# Patient Record
Sex: MALE | Race: WHITE | NOT HISPANIC OR LATINO | Employment: FULL TIME | URBAN - METROPOLITAN AREA
[De-identification: names, ages, dates, MRNs, and addresses within clinical notes are randomized per-mention and may not be internally consistent; named-entity substitution may affect disease eponyms.]

---

## 2017-06-26 ENCOUNTER — OFFICE VISIT (OUTPATIENT)
Dept: CARDIOLOGY | Facility: MEDICAL CENTER | Age: 57
End: 2017-06-26
Payer: COMMERCIAL

## 2017-06-26 VITALS
WEIGHT: 231 LBS | SYSTOLIC BLOOD PRESSURE: 200 MMHG | DIASTOLIC BLOOD PRESSURE: 120 MMHG | HEIGHT: 70 IN | BODY MASS INDEX: 33.07 KG/M2 | HEART RATE: 76 BPM | OXYGEN SATURATION: 97 %

## 2017-06-26 DIAGNOSIS — I10 ESSENTIAL HYPERTENSION: ICD-10-CM

## 2017-06-26 DIAGNOSIS — I49.1 PREMATURE ATRIAL COMPLEX: ICD-10-CM

## 2017-06-26 DIAGNOSIS — I15.9 SECONDARY HYPERTENSION: ICD-10-CM

## 2017-06-26 LAB — EKG IMPRESSION: NORMAL

## 2017-06-26 PROCEDURE — 93000 ELECTROCARDIOGRAM COMPLETE: CPT | Performed by: INTERNAL MEDICINE

## 2017-06-26 PROCEDURE — 99204 OFFICE O/P NEW MOD 45 MIN: CPT | Performed by: INTERNAL MEDICINE

## 2017-06-26 RX ORDER — IBUPROFEN 800 MG/1
800 TABLET ORAL EVERY 8 HOURS PRN
Refills: 3 | COMMUNITY
Start: 2017-06-14 | End: 2020-01-10

## 2017-06-26 RX ORDER — PREDNISOLONE ACETATE 10 MG/ML
SUSPENSION/ DROPS OPHTHALMIC
Refills: 2 | COMMUNITY
Start: 2017-05-14 | End: 2020-01-10

## 2017-06-26 RX ORDER — HYDROCODONE BITARTRATE AND ACETAMINOPHEN 5; 325 MG/1; MG/1
1 TABLET ORAL EVERY 8 HOURS PRN
Refills: 0 | COMMUNITY
Start: 2017-05-25 | End: 2020-01-10

## 2017-06-26 RX ORDER — DOXYCYCLINE HYCLATE 100 MG
100 TABLET ORAL EVERY 4 HOURS PRN
Refills: 0 | COMMUNITY
Start: 2017-06-06 | End: 2020-01-10

## 2017-06-26 RX ORDER — AMLODIPINE BESYLATE 10 MG/1
10 TABLET ORAL
Refills: 1 | COMMUNITY
Start: 2017-06-09 | End: 2020-01-10

## 2017-06-26 RX ORDER — LOSARTAN POTASSIUM 100 MG/1
100 TABLET ORAL DAILY
Qty: 30 TAB | Refills: 11 | Status: SHIPPED | OUTPATIENT
Start: 2017-06-26 | End: 2020-01-10

## 2017-06-26 RX ORDER — NADOLOL 20 MG/1
20 TABLET ORAL
Refills: 1 | COMMUNITY
Start: 2017-05-25 | End: 2017-06-26

## 2017-06-26 RX ORDER — CIPROFLOXACIN HYDROCHLORIDE 3.5 MG/ML
SOLUTION/ DROPS TOPICAL
Refills: 2 | COMMUNITY
Start: 2017-05-23 | End: 2020-01-10

## 2017-06-26 ASSESSMENT — ENCOUNTER SYMPTOMS
SORE THROAT: 0
HEMOPTYSIS: 0
DIZZINESS: 0
RESPIRATORY NEGATIVE: 1
STRIDOR: 0
PND: 0
CLAUDICATION: 0
SPUTUM PRODUCTION: 0
WHEEZING: 0
GASTROINTESTINAL NEGATIVE: 1
CONSTITUTIONAL NEGATIVE: 1
ORTHOPNEA: 0
NEUROLOGICAL NEGATIVE: 1
MUSCULOSKELETAL NEGATIVE: 1
EYES NEGATIVE: 1
CHILLS: 0
COUGH: 0
BRUISES/BLEEDS EASILY: 0
FEVER: 0
PALPITATIONS: 0
SHORTNESS OF BREATH: 0
CARDIOVASCULAR NEGATIVE: 1
LOSS OF CONSCIOUSNESS: 0
WEAKNESS: 0

## 2017-06-26 NOTE — Clinical Note
[facility]   [address]  [place] [address]  Phone: [phone]  Fax: [phone]              Conner Fox  1960    Encounter Date: 6/26/2017    David Chatman M.D.          PROGRESS NOTE:  Subjective:   Conner Fox is a 57 y.o. male who presents today for evaluation for palpitations-blood pressure. His blood pressure today is 200 but is noticed as high as 220 in the past. He is supposed be on nadolol but says that he didn't take his dose today. He is concerned about being on this medication as it causes a lot of sexual side effects.  He was referred for occasional PACs. He does not get chest pain or shortness breath with exertion. ECG today shows a normal sinus rhythm with a left axis deviation and a borderline left anterior fascicular block.     Past Medical History   Diagnosis Date   • Hypertension      History reviewed. No pertinent past surgical history.  History reviewed. No pertinent family history.  History   Smoking status   • Never Smoker    Smokeless tobacco   • Not on file     Allergies   Allergen Reactions   • Lisinopril Cough     Outpatient Encounter Prescriptions as of 6/26/2017   Medication Sig Dispense Refill   • amlodipine (NORVASC) 10 MG Tab Take 10 mg by mouth.  1   • losartan (COZAAR) 100 MG Tab Take 1 Tab by mouth every day. 30 Tab 11   • ciprofloxacin (CILOXIN) 0.3 % Solution PLACE 1 DROP IN THE LEFT EYE 4 TIMES DAILY  2   • doxycycline (VIBRAMYCIN) 100 MG Tab Take 100 mg by mouth every four hours as needed. EVERY 4 TO 6 HOURS AS NEEDED  0   • hydrocodone-acetaminophen (NORCO) 5-325 MG Tab per tablet Take 1 Tab by mouth every 8 hours as needed.  0   • ibuprofen (MOTRIN) 800 MG Tab Take 800 mg by mouth every 8 hours as needed.  3   • prednisoLONE acetate (PRED FORTE) 1 % Suspension PLACE 1 DROP IN THE LEFT EYE 4 TIMES DAILY  2   • [DISCONTINUED] nadolol (CORGARD) 20 MG Tab Take 20 mg by mouth.  1     No  "facility-administered encounter medications on file as of 6/26/2017.     Review of Systems   Constitutional: Negative.  Negative for fever, chills and malaise/fatigue.   HENT: Negative.  Negative for sore throat.    Eyes: Negative.    Respiratory: Negative.  Negative for cough, hemoptysis, sputum production, shortness of breath, wheezing and stridor.    Cardiovascular: Negative.  Negative for chest pain, palpitations, orthopnea, claudication, leg swelling and PND.   Gastrointestinal: Negative.    Genitourinary: Negative.    Musculoskeletal: Negative.    Skin: Negative.    Neurological: Negative.  Negative for dizziness, loss of consciousness and weakness.   Endo/Heme/Allergies: Negative.  Does not bruise/bleed easily.   All other systems reviewed and are negative.       Objective:   /120 mmHg  Pulse 76  Ht 1.778 m (5' 10\")  Wt 104.781 kg (231 lb)  BMI 33.15 kg/m2  SpO2 97%    Physical Exam   Constitutional: He is oriented to person, place, and time. He appears well-developed and well-nourished. No distress.   HENT:   Head: Normocephalic.   Mouth/Throat: Oropharynx is clear and moist.   Eyes: EOM are normal. Pupils are equal, round, and reactive to light. Right eye exhibits no discharge. Left eye exhibits no discharge. No scleral icterus.   Neck: Normal range of motion. Neck supple. No JVD present. No tracheal deviation present.   Cardiovascular: Normal rate, regular rhythm, S1 normal, S2 normal, normal heart sounds, intact distal pulses and normal pulses.  Exam reveals no gallop, no S3, no S4 and no friction rub.    No murmur heard.   No systolic murmur is present    No diastolic murmur is present   Pulses:       Carotid pulses are 2+ on the right side, and 2+ on the left side.       Radial pulses are 2+ on the right side, and 2+ on the left side.        Dorsalis pedis pulses are 2+ on the right side, and 2+ on the left side.        Posterior tibial pulses are 2+ on the right side, and 2+ on the left " side.   Pulmonary/Chest: Effort normal and breath sounds normal. No respiratory distress. He has no wheezes. He has no rales.   Abdominal: Soft. Bowel sounds are normal. He exhibits no distension and no mass. There is no tenderness. There is no rebound and no guarding.   Musculoskeletal: He exhibits no edema.   Neurological: He is alert and oriented to person, place, and time. No cranial nerve deficit.   Skin: Skin is warm and dry. He is not diaphoretic. No pallor.   Psychiatric: He has a normal mood and affect. His behavior is normal. Judgment and thought content normal.   Nursing note and vitals reviewed.      Assessment:     1. Secondary hypertension  EKG    losartan (COZAAR) 100 MG Tab   2. Essential hypertension  losartan (COZAAR) 100 MG Tab   3. Premature atrial complex  losartan (COZAAR) 100 MG Tab       Medical Decision Making:  Today's Assessment / Status / Plan:     57-year-old male with no functional limitations and occasional PACs with elevated blood pressure. Likely his PACs or result of his uncontrolled high blood pressure. Because of his sexual side effects  I will stop his nadolol today.  I will start him on 100 of a certain today. I also gave him instructions on how to titrate off his nadolol over the next 2 weeks.    Thank for you allowing me to take part in your patient's care, please call should you have any questions or would like to discuss this patient.      Rahul De La Paz M.D.  [address] [address]  VIA Facsimile: [phone]

## 2017-06-26 NOTE — PROGRESS NOTES
Subjective:   Conner Fox is a 57 y.o. male who presents today for evaluation for palpitations-blood pressure. His blood pressure today is 200 but is noticed as high as 220 in the past. He is supposed be on nadolol but says that he didn't take his dose today. He is concerned about being on this medication as it causes a lot of sexual side effects.  He was referred for occasional PACs. He does not get chest pain or shortness breath with exertion. ECG today shows a normal sinus rhythm with a left axis deviation and a borderline left anterior fascicular block.     Past Medical History   Diagnosis Date   • Hypertension      History reviewed. No pertinent past surgical history.  History reviewed. No pertinent family history.  History   Smoking status   • Never Smoker    Smokeless tobacco   • Not on file     Allergies   Allergen Reactions   • Lisinopril Cough     Outpatient Encounter Prescriptions as of 6/26/2017   Medication Sig Dispense Refill   • amlodipine (NORVASC) 10 MG Tab Take 10 mg by mouth.  1   • losartan (COZAAR) 100 MG Tab Take 1 Tab by mouth every day. 30 Tab 11   • ciprofloxacin (CILOXIN) 0.3 % Solution PLACE 1 DROP IN THE LEFT EYE 4 TIMES DAILY  2   • doxycycline (VIBRAMYCIN) 100 MG Tab Take 100 mg by mouth every four hours as needed. EVERY 4 TO 6 HOURS AS NEEDED  0   • hydrocodone-acetaminophen (NORCO) 5-325 MG Tab per tablet Take 1 Tab by mouth every 8 hours as needed.  0   • ibuprofen (MOTRIN) 800 MG Tab Take 800 mg by mouth every 8 hours as needed.  3   • prednisoLONE acetate (PRED FORTE) 1 % Suspension PLACE 1 DROP IN THE LEFT EYE 4 TIMES DAILY  2   • [DISCONTINUED] nadolol (CORGARD) 20 MG Tab Take 20 mg by mouth.  1     No facility-administered encounter medications on file as of 6/26/2017.     Review of Systems   Constitutional: Negative.  Negative for fever, chills and malaise/fatigue.   HENT: Negative.  Negative for sore throat.    Eyes: Negative.    Respiratory: Negative.  Negative for  "cough, hemoptysis, sputum production, shortness of breath, wheezing and stridor.    Cardiovascular: Negative.  Negative for chest pain, palpitations, orthopnea, claudication, leg swelling and PND.   Gastrointestinal: Negative.    Genitourinary: Negative.    Musculoskeletal: Negative.    Skin: Negative.    Neurological: Negative.  Negative for dizziness, loss of consciousness and weakness.   Endo/Heme/Allergies: Negative.  Does not bruise/bleed easily.   All other systems reviewed and are negative.       Objective:   /120 mmHg  Pulse 76  Ht 1.778 m (5' 10\")  Wt 104.781 kg (231 lb)  BMI 33.15 kg/m2  SpO2 97%    Physical Exam   Constitutional: He is oriented to person, place, and time. He appears well-developed and well-nourished. No distress.   HENT:   Head: Normocephalic.   Mouth/Throat: Oropharynx is clear and moist.   Eyes: EOM are normal. Pupils are equal, round, and reactive to light. Right eye exhibits no discharge. Left eye exhibits no discharge. No scleral icterus.   Neck: Normal range of motion. Neck supple. No JVD present. No tracheal deviation present.   Cardiovascular: Normal rate, regular rhythm, S1 normal, S2 normal, normal heart sounds, intact distal pulses and normal pulses.  Exam reveals no gallop, no S3, no S4 and no friction rub.    No murmur heard.   No systolic murmur is present    No diastolic murmur is present   Pulses:       Carotid pulses are 2+ on the right side, and 2+ on the left side.       Radial pulses are 2+ on the right side, and 2+ on the left side.        Dorsalis pedis pulses are 2+ on the right side, and 2+ on the left side.        Posterior tibial pulses are 2+ on the right side, and 2+ on the left side.   Pulmonary/Chest: Effort normal and breath sounds normal. No respiratory distress. He has no wheezes. He has no rales.   Abdominal: Soft. Bowel sounds are normal. He exhibits no distension and no mass. There is no tenderness. There is no rebound and no guarding. "   Musculoskeletal: He exhibits no edema.   Neurological: He is alert and oriented to person, place, and time. No cranial nerve deficit.   Skin: Skin is warm and dry. He is not diaphoretic. No pallor.   Psychiatric: He has a normal mood and affect. His behavior is normal. Judgment and thought content normal.   Nursing note and vitals reviewed.      Assessment:     1. Secondary hypertension  EKG    losartan (COZAAR) 100 MG Tab   2. Essential hypertension  losartan (COZAAR) 100 MG Tab   3. Premature atrial complex  losartan (COZAAR) 100 MG Tab       Medical Decision Making:  Today's Assessment / Status / Plan:     57-year-old male with no functional limitations and occasional PACs with elevated blood pressure. Likely his PACs or result of his uncontrolled high blood pressure. Because of his sexual side effects  I will stop his nadolol today.  I will start him on 100 of a certain today. I also gave him instructions on how to titrate off his nadolol over the next 2 weeks.    Thank for you allowing me to take part in your patient's care, please call should you have any questions or would like to discuss this patient.

## 2017-06-26 NOTE — MR AVS SNAPSHOT
"        Conner Perezfrank   2017 3:15 PM   Office Visit   MRN: [id number]    Department:  [facility]   Dept Phone:  [phone]    Description:  Male : 1960   Provider:  David Chatman M.D.           Reason for Visit     New Patient           Allergies as of 2017     Allergen Noted Reactions    Lisinopril 2017   Cough      You were diagnosed with     Secondary hypertension   [3275573]       Essential hypertension   [9690736]       Premature atrial complex   [[id number]]         Vital Signs     Blood Pressure Pulse Height Weight Body Mass Index Oxygen Saturation    200/120 mmHg 76 1.778 m (5' 10\") 104.781 kg (231 lb) 33.15 kg/m2 97%    Smoking Status                   Never Smoker            Basic Information     Date Of Birth Sex Race Ethnicity Preferred Language    1960 Male White Non- English      Problem List              ICD-10-CM Priority Class Noted - Resolved    Essential hypertension I10   2017 - Present    Premature atrial complex I49.1   2017 - Present      Health Maintenance     Patient has no pending health maintenance at this time      Results       Current Immunizations     No immunizations on file.      Below and/or attached are the medications your provider expects you to take. Review all of your home medications and newly ordered medications with your provider and/or pharmacist. Follow medication instructions as directed by your provider and/or pharmacist. Please keep your medication list with you and share with your provider. Update the information when medications are discontinued, doses are changed, or new medications (including over-the-counter products) are added; and carry medication information at all times in the event of emergency situations     Allergies:  LISINOPRIL - Cough               Medications  Valid as of: 2017 -  4:11 PM    Generic Name Brand Name Tablet Size Instructions for use    AmLODIPine Besylate (Tab) NORVASC 10 " MG Take 10 mg by mouth.        Ciprofloxacin HCl (Solution) CILOXIN 0.3 % PLACE 1 DROP IN THE LEFT EYE 4 TIMES DAILY        Doxycycline Hyclate (Tab) VIBRAMYCIN 100 MG Take 100 mg by mouth every four hours as needed. EVERY 4 TO 6 HOURS AS NEEDED        Hydrocodone-Acetaminophen (Tab) NORCO 5-325 MG Take 1 Tab by mouth every 8 hours as needed.        Ibuprofen (Tab) MOTRIN 800 MG Take 800 mg by mouth every 8 hours as needed.        Losartan Potassium (Tab) COZAAR 100 MG Take 1 Tab by mouth every day.        PrednisoLONE Acetate (Suspension) PRED FORTE 1 % PLACE 1 DROP IN THE LEFT EYE 4 TIMES DAILY        .                 Medicines prescribed today were sent to:     [facility] #[id number] - [place], [place] - [address] AT [address] [address]    Phone: [phone] Fax: [phone]    Open 24 Hours?: No      Medication refill instructions:       If your prescription bottle indicates you have medication refills left, it is not necessary to call your provider’s office. Please contact your pharmacy and they will refill your medication.    If your prescription bottle indicates you do not have any refills left, you may request refills at any time through one of the following ways: The online [employer] system (except Urgent Care), by calling your provider’s office, or by asking your pharmacy to contact your provider’s office with a refill request. Medication refills are processed only during regular business hours and may not be available until the next business day. Your provider may request additional information or to have a follow-up visit with you prior to refilling your medication.   *Please Note: Medication refills are assigned a new Rx number when refilled electronically. Your pharmacy may indicate that no refills were authorized even though a new prescription for the same medication is available at the pharmacy. Please request the medicine by name with the pharmacy before  contacting your provider for a refill.        Instructions    Nadolol cut in half every week  Start 20 mg once daily for one week  Then 10 mg once daily          MyChart Access Code: Activation code not generated  Current MyChart Status: Active

## 2019-11-06 ENCOUNTER — OFFICE VISIT (OUTPATIENT)
Dept: URGENT CARE | Facility: CLINIC | Age: 59
End: 2019-11-06
Payer: COMMERCIAL

## 2019-11-06 VITALS
SYSTOLIC BLOOD PRESSURE: 142 MMHG | HEIGHT: 70 IN | TEMPERATURE: 98 F | HEART RATE: 78 BPM | BODY MASS INDEX: 33.21 KG/M2 | OXYGEN SATURATION: 96 % | DIASTOLIC BLOOD PRESSURE: 84 MMHG | WEIGHT: 232 LBS

## 2019-11-06 DIAGNOSIS — L60.0 INGROWN TOENAIL OF LEFT FOOT WITH INFECTION: Primary | ICD-10-CM

## 2019-11-06 PROCEDURE — 99214 OFFICE O/P EST MOD 30 MIN: CPT | Performed by: PHYSICIAN ASSISTANT

## 2019-11-06 RX ORDER — CEPHALEXIN 500 MG/1
500 CAPSULE ORAL 4 TIMES DAILY
Qty: 40 CAP | Refills: 0 | Status: SHIPPED | OUTPATIENT
Start: 2019-11-06 | End: 2019-11-16

## 2019-11-06 SDOH — HEALTH STABILITY: MENTAL HEALTH: HOW OFTEN DO YOU HAVE A DRINK CONTAINING ALCOHOL?: 2-4 TIMES A MONTH

## 2019-11-11 ASSESSMENT — ENCOUNTER SYMPTOMS
FOCAL WEAKNESS: 0
TINGLING: 0
FEVER: 0
SENSORY CHANGE: 0
NUMBNESS: 0

## 2019-11-11 NOTE — PROGRESS NOTES
Subjective:      Conner Fox is a 59 y.o. male who presents with Toe Injury (hang nail from 2 weeks ago.  Pt states that he has been treating it on his own but it is now getting worse.)    PMH:  has a past medical history of Hypertension.  MEDS:   Current Outpatient Medications:   •  amlodipine (NORVASC) 10 MG Tab, Take 10 mg by mouth., Disp: , Rfl: 1  •  ciprofloxacin (CILOXIN) 0.3 % Solution, PLACE 1 DROP IN THE LEFT EYE 4 TIMES DAILY, Disp: , Rfl: 2  •  doxycycline (VIBRAMYCIN) 100 MG Tab, Take 100 mg by mouth every four hours as needed. EVERY 4 TO 6 HOURS AS NEEDED, Disp: , Rfl: 0  •  hydrocodone-acetaminophen (NORCO) 5-325 MG Tab per tablet, Take 1 Tab by mouth every 8 hours as needed., Disp: , Rfl: 0  •  ibuprofen (MOTRIN) 800 MG Tab, Take 800 mg by mouth every 8 hours as needed., Disp: , Rfl: 3  •  prednisoLONE acetate (PRED FORTE) 1 % Suspension, PLACE 1 DROP IN THE LEFT EYE 4 TIMES DAILY, Disp: , Rfl: 2  •  losartan (COZAAR) 100 MG Tab, Take 1 Tab by mouth every day., Disp: 30 Tab, Rfl: 11  ALLERGIES:   Allergies   Allergen Reactions   • Levaquin      GI bleeding     • Lisinopril Cough     SURGHX: History reviewed. No pertinent surgical history.  SOCHX:  reports that he has never smoked. He has never used smokeless tobacco. He reports current alcohol use. He reports that he does not use drugs.  FH: Reviewed with patient, not pertinent to this visit.           Patient presents with:  Toe Injury: hang nail from 2 weeks ago.  Pt states that he has been treating it on his own but it is now getting worse.  PT has been using hydrogen peroxide and alcohol with no improvement.          Toe Injury   This is a new problem. Episode onset: 2 weeks. The problem occurs constantly. The problem has been gradually worsening. Pertinent negatives include no fever or numbness. The symptoms are aggravated by standing, walking and exertion. Treatments tried: hydrogen peroxide, alcohol. The treatment provided no relief.    "      Review of Systems   Constitutional: Negative for fever.   Musculoskeletal: Negative for joint pain.   Neurological: Negative for tingling, sensory change, focal weakness and numbness.   All other systems reviewed and are negative.         Objective:     /84   Pulse 78   Temp 36.7 °C (98 °F)   Ht 1.778 m (5' 10\")   Wt 105.2 kg (232 lb)   SpO2 96%   BMI 33.29 kg/m²      Physical Exam  Vitals signs and nursing note reviewed.   Constitutional:       General: He is not in acute distress.     Appearance: Normal appearance. He is well-developed.   HENT:      Head: Normocephalic and atraumatic.      Nose: Nose normal.      Mouth/Throat:      Mouth: Mucous membranes are moist.   Eyes:      Extraocular Movements: Extraocular movements intact.      Conjunctiva/sclera: Conjunctivae normal.      Pupils: Pupils are equal, round, and reactive to light.   Neck:      Musculoskeletal: Normal range of motion and neck supple.   Cardiovascular:      Rate and Rhythm: Normal rate and regular rhythm.      Pulses: Normal pulses.      Heart sounds: Normal heart sounds.   Pulmonary:      Effort: Pulmonary effort is normal.      Breath sounds: Normal breath sounds.   Abdominal:      Palpations: Abdomen is soft.   Musculoskeletal:      Left foot: Normal range of motion and normal capillary refill. Tenderness and swelling present. No bony tenderness.        Feet:    Skin:     General: Skin is warm and dry.      Capillary Refill: Capillary refill takes less than 2 seconds.   Neurological:      General: No focal deficit present.      Mental Status: He is alert and oriented to person, place, and time.      Motor: No abnormal muscle tone.   Psychiatric:         Mood and Affect: Mood normal.                 Assessment/Plan:     1. Ingrown toenail of left foot with infection  cephALEXin (KEFLEX) 500 MG Cap     PT to soak injured area in epsom salt/warm water soaks, 3-4 times daily until significantly improved.       PT should follow " up with PCP in 1-2 days for re-evaluation if symptoms have not improved.  Discussed red flags and reasons to return to UC or ED.  Pt and/or family verbalized understanding of diagnosis and follow up instructions and was offered informational handout on diagnosis.  PT discharged.

## 2020-01-10 ENCOUNTER — APPOINTMENT (OUTPATIENT)
Dept: RADIOLOGY | Facility: MEDICAL CENTER | Age: 60
DRG: 246 | End: 2020-01-10
Attending: INTERNAL MEDICINE
Payer: COMMERCIAL

## 2020-01-10 ENCOUNTER — APPOINTMENT (OUTPATIENT)
Dept: RADIOLOGY | Facility: MEDICAL CENTER | Age: 60
DRG: 246 | End: 2020-01-10
Attending: EMERGENCY MEDICINE
Payer: COMMERCIAL

## 2020-01-10 ENCOUNTER — APPOINTMENT (OUTPATIENT)
Dept: CARDIOLOGY | Facility: MEDICAL CENTER | Age: 60
DRG: 246 | End: 2020-01-10
Attending: INTERNAL MEDICINE
Payer: COMMERCIAL

## 2020-01-10 ENCOUNTER — HOSPITAL ENCOUNTER (INPATIENT)
Facility: MEDICAL CENTER | Age: 60
LOS: 4 days | DRG: 246 | End: 2020-01-14
Attending: EMERGENCY MEDICINE | Admitting: INTERNAL MEDICINE
Payer: COMMERCIAL

## 2020-01-10 DIAGNOSIS — R07.9 CHEST PAIN, UNSPECIFIED TYPE: ICD-10-CM

## 2020-01-10 DIAGNOSIS — I10 HYPERTENSION, UNSPECIFIED TYPE: ICD-10-CM

## 2020-01-10 PROBLEM — E87.6 HYPOKALEMIA: Status: ACTIVE | Noted: 2020-01-10

## 2020-01-10 PROBLEM — E87.1 HYPONATREMIA: Status: ACTIVE | Noted: 2020-01-10

## 2020-01-10 PROBLEM — I16.0 HYPERTENSIVE URGENCY: Status: ACTIVE | Noted: 2020-01-10

## 2020-01-10 PROBLEM — I21.4 NSTEMI (NON-ST ELEVATED MYOCARDIAL INFARCTION) (HCC): Status: ACTIVE | Noted: 2020-01-10

## 2020-01-10 PROBLEM — N17.9 AKI (ACUTE KIDNEY INJURY) (HCC): Status: ACTIVE | Noted: 2020-01-10

## 2020-01-10 PROBLEM — D72.823 LEUKEMOID REACTION: Status: ACTIVE | Noted: 2020-01-10

## 2020-01-10 PROBLEM — D72.829 LEUKOCYTOSIS: Status: ACTIVE | Noted: 2020-01-10

## 2020-01-10 PROBLEM — R74.01 TRANSAMINITIS: Status: ACTIVE | Noted: 2020-01-10

## 2020-01-10 LAB
ALBUMIN SERPL BCP-MCNC: 3.8 G/DL (ref 3.2–4.9)
ALBUMIN SERPL BCP-MCNC: 4.2 G/DL (ref 3.2–4.9)
ALBUMIN/GLOB SERPL: 1.3 G/DL
ALBUMIN/GLOB SERPL: 1.3 G/DL
ALP SERPL-CCNC: 58 U/L (ref 30–99)
ALP SERPL-CCNC: 64 U/L (ref 30–99)
ALT SERPL-CCNC: 39 U/L (ref 2–50)
ALT SERPL-CCNC: 46 U/L (ref 2–50)
ANION GAP SERPL CALC-SCNC: 13 MMOL/L (ref 0–11.9)
ANION GAP SERPL CALC-SCNC: 13 MMOL/L (ref 0–11.9)
AST SERPL-CCNC: 134 U/L (ref 12–45)
AST SERPL-CCNC: 152 U/L (ref 12–45)
BASOPHILS # BLD AUTO: 0.1 % (ref 0–1.8)
BASOPHILS # BLD AUTO: 0.3 % (ref 0–1.8)
BASOPHILS # BLD: 0.02 K/UL (ref 0–0.12)
BASOPHILS # BLD: 0.04 K/UL (ref 0–0.12)
BILIRUB SERPL-MCNC: 1.3 MG/DL (ref 0.1–1.5)
BILIRUB SERPL-MCNC: 1.4 MG/DL (ref 0.1–1.5)
BUN SERPL-MCNC: 22 MG/DL (ref 8–22)
BUN SERPL-MCNC: 23 MG/DL (ref 8–22)
CALCIUM SERPL-MCNC: 8.7 MG/DL (ref 8.5–10.5)
CALCIUM SERPL-MCNC: 9.4 MG/DL (ref 8.5–10.5)
CHLORIDE SERPL-SCNC: 100 MMOL/L (ref 96–112)
CHLORIDE SERPL-SCNC: 99 MMOL/L (ref 96–112)
CO2 SERPL-SCNC: 21 MMOL/L (ref 20–33)
CO2 SERPL-SCNC: 22 MMOL/L (ref 20–33)
CREAT SERPL-MCNC: 1.56 MG/DL (ref 0.5–1.4)
CREAT SERPL-MCNC: 1.77 MG/DL (ref 0.5–1.4)
EKG IMPRESSION: NORMAL
EOSINOPHIL # BLD AUTO: 0 K/UL (ref 0–0.51)
EOSINOPHIL # BLD AUTO: 0.01 K/UL (ref 0–0.51)
EOSINOPHIL NFR BLD: 0 % (ref 0–6.9)
EOSINOPHIL NFR BLD: 0.1 % (ref 0–6.9)
ERYTHROCYTE [DISTWIDTH] IN BLOOD BY AUTOMATED COUNT: 41 FL (ref 35.9–50)
ERYTHROCYTE [DISTWIDTH] IN BLOOD BY AUTOMATED COUNT: 41.1 FL (ref 35.9–50)
GLOBULIN SER CALC-MCNC: 2.9 G/DL (ref 1.9–3.5)
GLOBULIN SER CALC-MCNC: 3.3 G/DL (ref 1.9–3.5)
GLUCOSE BLD-MCNC: 161 MG/DL (ref 65–99)
GLUCOSE BLD-MCNC: 190 MG/DL (ref 65–99)
GLUCOSE BLD-MCNC: 196 MG/DL (ref 65–99)
GLUCOSE SERPL-MCNC: 152 MG/DL (ref 65–99)
GLUCOSE SERPL-MCNC: 162 MG/DL (ref 65–99)
HCT VFR BLD AUTO: 41.8 % (ref 42–52)
HCT VFR BLD AUTO: 45.7 % (ref 42–52)
HGB BLD-MCNC: 14.4 G/DL (ref 14–18)
HGB BLD-MCNC: 15.7 G/DL (ref 14–18)
IMM GRANULOCYTES # BLD AUTO: 0.07 K/UL (ref 0–0.11)
IMM GRANULOCYTES # BLD AUTO: 0.08 K/UL (ref 0–0.11)
IMM GRANULOCYTES NFR BLD AUTO: 0.5 % (ref 0–0.9)
IMM GRANULOCYTES NFR BLD AUTO: 0.6 % (ref 0–0.9)
LACTATE BLD-SCNC: 1.5 MMOL/L (ref 0.5–2)
LV EJECT FRACT  99904: 40
LV EJECT FRACT MOD 2C 99903: 35.81
LV EJECT FRACT MOD 4C 99902: 47
LV EJECT FRACT MOD BP 99901: 41.64
LYMPHOCYTES # BLD AUTO: 0.92 K/UL (ref 1–4.8)
LYMPHOCYTES # BLD AUTO: 1.65 K/UL (ref 1–4.8)
LYMPHOCYTES NFR BLD: 11.5 % (ref 22–41)
LYMPHOCYTES NFR BLD: 6.5 % (ref 22–41)
MAGNESIUM SERPL-MCNC: 1.7 MG/DL (ref 1.5–2.5)
MCH RBC QN AUTO: 31.5 PG (ref 27–33)
MCH RBC QN AUTO: 31.8 PG (ref 27–33)
MCHC RBC AUTO-ENTMCNC: 34.4 G/DL (ref 33.7–35.3)
MCHC RBC AUTO-ENTMCNC: 34.4 G/DL (ref 33.7–35.3)
MCV RBC AUTO: 91.8 FL (ref 81.4–97.8)
MCV RBC AUTO: 92.3 FL (ref 81.4–97.8)
MONOCYTES # BLD AUTO: 1.44 K/UL (ref 0–0.85)
MONOCYTES # BLD AUTO: 1.56 K/UL (ref 0–0.85)
MONOCYTES NFR BLD AUTO: 10.2 % (ref 0–13.4)
MONOCYTES NFR BLD AUTO: 10.9 % (ref 0–13.4)
NEUTROPHILS # BLD AUTO: 10.96 K/UL (ref 1.82–7.42)
NEUTROPHILS # BLD AUTO: 11.66 K/UL (ref 1.82–7.42)
NEUTROPHILS NFR BLD: 76.6 % (ref 44–72)
NEUTROPHILS NFR BLD: 82.7 % (ref 44–72)
NRBC # BLD AUTO: 0 K/UL
NRBC # BLD AUTO: 0 K/UL
NRBC BLD-RTO: 0 /100 WBC
NRBC BLD-RTO: 0 /100 WBC
NT-PROBNP SERPL IA-MCNC: 3207 PG/ML (ref 0–125)
PLATELET # BLD AUTO: 181 K/UL (ref 164–446)
PLATELET # BLD AUTO: 184 K/UL (ref 164–446)
PMV BLD AUTO: 11.9 FL (ref 9–12.9)
PMV BLD AUTO: 12.2 FL (ref 9–12.9)
POTASSIUM SERPL-SCNC: 3.3 MMOL/L (ref 3.6–5.5)
POTASSIUM SERPL-SCNC: 3.5 MMOL/L (ref 3.6–5.5)
PROT SERPL-MCNC: 6.7 G/DL (ref 6–8.2)
PROT SERPL-MCNC: 7.5 G/DL (ref 6–8.2)
RBC # BLD AUTO: 4.53 M/UL (ref 4.7–6.1)
RBC # BLD AUTO: 4.98 M/UL (ref 4.7–6.1)
SODIUM SERPL-SCNC: 134 MMOL/L (ref 135–145)
SODIUM SERPL-SCNC: 134 MMOL/L (ref 135–145)
TROPONIN T SERPL-MCNC: 3000 NG/L (ref 6–19)
TROPONIN T SERPL-MCNC: 4485 NG/L (ref 6–19)
TROPONIN T SERPL-MCNC: 5335 NG/L (ref 6–19)
WBC # BLD AUTO: 14.1 K/UL (ref 4.8–10.8)
WBC # BLD AUTO: 14.3 K/UL (ref 4.8–10.8)

## 2020-01-10 PROCEDURE — 93005 ELECTROCARDIOGRAM TRACING: CPT | Performed by: INTERNAL MEDICINE

## 2020-01-10 PROCEDURE — 770022 HCHG ROOM/CARE - ICU (200)

## 2020-01-10 PROCEDURE — 83735 ASSAY OF MAGNESIUM: CPT

## 2020-01-10 PROCEDURE — 82962 GLUCOSE BLOOD TEST: CPT | Mod: 91

## 2020-01-10 PROCEDURE — 99291 CRITICAL CARE FIRST HOUR: CPT

## 2020-01-10 PROCEDURE — 99223 1ST HOSP IP/OBS HIGH 75: CPT | Performed by: INTERNAL MEDICINE

## 2020-01-10 PROCEDURE — C1887 CATHETER, GUIDING: HCPCS

## 2020-01-10 PROCEDURE — 93005 ELECTROCARDIOGRAM TRACING: CPT | Performed by: EMERGENCY MEDICINE

## 2020-01-10 PROCEDURE — 700102 HCHG RX REV CODE 250 W/ 637 OVERRIDE(OP)

## 2020-01-10 PROCEDURE — 700102 HCHG RX REV CODE 250 W/ 637 OVERRIDE(OP): Performed by: INTERNAL MEDICINE

## 2020-01-10 PROCEDURE — 700101 HCHG RX REV CODE 250: Performed by: INTERNAL MEDICINE

## 2020-01-10 PROCEDURE — 700111 HCHG RX REV CODE 636 W/ 250 OVERRIDE (IP): Performed by: INTERNAL MEDICINE

## 2020-01-10 PROCEDURE — A9270 NON-COVERED ITEM OR SERVICE: HCPCS | Performed by: INTERNAL MEDICINE

## 2020-01-10 PROCEDURE — B2111ZZ FLUOROSCOPY OF MULTIPLE CORONARY ARTERIES USING LOW OSMOLAR CONTRAST: ICD-10-PCS | Performed by: INTERNAL MEDICINE

## 2020-01-10 PROCEDURE — 84484 ASSAY OF TROPONIN QUANT: CPT

## 2020-01-10 PROCEDURE — 96375 TX/PRO/DX INJ NEW DRUG ADDON: CPT

## 2020-01-10 PROCEDURE — A9270 NON-COVERED ITEM OR SERVICE: HCPCS

## 2020-01-10 PROCEDURE — 83880 ASSAY OF NATRIURETIC PEPTIDE: CPT

## 2020-01-10 PROCEDURE — 93010 ELECTROCARDIOGRAM REPORT: CPT | Performed by: INTERNAL MEDICINE

## 2020-01-10 PROCEDURE — 93010 ELECTROCARDIOGRAM REPORT: CPT | Mod: 76 | Performed by: INTERNAL MEDICINE

## 2020-01-10 PROCEDURE — A9270 NON-COVERED ITEM OR SERVICE: HCPCS | Performed by: EMERGENCY MEDICINE

## 2020-01-10 PROCEDURE — 93306 TTE W/DOPPLER COMPLETE: CPT

## 2020-01-10 PROCEDURE — 80053 COMPREHEN METABOLIC PANEL: CPT

## 2020-01-10 PROCEDURE — 99152 MOD SED SAME PHYS/QHP 5/>YRS: CPT | Performed by: INTERNAL MEDICINE

## 2020-01-10 PROCEDURE — 700111 HCHG RX REV CODE 636 W/ 250 OVERRIDE (IP)

## 2020-01-10 PROCEDURE — 93458 L HRT ARTERY/VENTRICLE ANGIO: CPT | Mod: 26,59 | Performed by: INTERNAL MEDICINE

## 2020-01-10 PROCEDURE — 99223 1ST HOSP IP/OBS HIGH 75: CPT | Mod: 25 | Performed by: INTERNAL MEDICINE

## 2020-01-10 PROCEDURE — 71045 X-RAY EXAM CHEST 1 VIEW: CPT

## 2020-01-10 PROCEDURE — 93306 TTE W/DOPPLER COMPLETE: CPT | Mod: 26 | Performed by: INTERNAL MEDICINE

## 2020-01-10 PROCEDURE — 700101 HCHG RX REV CODE 250: Performed by: EMERGENCY MEDICINE

## 2020-01-10 PROCEDURE — 700102 HCHG RX REV CODE 250 W/ 637 OVERRIDE(OP): Performed by: EMERGENCY MEDICINE

## 2020-01-10 PROCEDURE — 4A023N7 MEASUREMENT OF CARDIAC SAMPLING AND PRESSURE, LEFT HEART, PERCUTANEOUS APPROACH: ICD-10-PCS | Performed by: INTERNAL MEDICINE

## 2020-01-10 PROCEDURE — 92941 PRQ TRLML REVSC TOT OCCL AMI: CPT | Mod: LC | Performed by: INTERNAL MEDICINE

## 2020-01-10 PROCEDURE — 027034Z DILATION OF CORONARY ARTERY, ONE ARTERY WITH DRUG-ELUTING INTRALUMINAL DEVICE, PERCUTANEOUS APPROACH: ICD-10-PCS | Performed by: INTERNAL MEDICINE

## 2020-01-10 PROCEDURE — 93005 ELECTROCARDIOGRAM TRACING: CPT

## 2020-01-10 PROCEDURE — 83605 ASSAY OF LACTIC ACID: CPT

## 2020-01-10 PROCEDURE — 700105 HCHG RX REV CODE 258: Performed by: INTERNAL MEDICINE

## 2020-01-10 PROCEDURE — 51798 US URINE CAPACITY MEASURE: CPT

## 2020-01-10 PROCEDURE — 96374 THER/PROPH/DIAG INJ IV PUSH: CPT

## 2020-01-10 PROCEDURE — 700101 HCHG RX REV CODE 250

## 2020-01-10 PROCEDURE — 304561 HCHG STAT O2

## 2020-01-10 PROCEDURE — 85025 COMPLETE CBC W/AUTO DIFF WBC: CPT

## 2020-01-10 PROCEDURE — 99291 CRITICAL CARE FIRST HOUR: CPT | Performed by: INTERNAL MEDICINE

## 2020-01-10 RX ORDER — MIDAZOLAM HYDROCHLORIDE 1 MG/ML
INJECTION INTRAMUSCULAR; INTRAVENOUS
Status: COMPLETED
Start: 2020-01-10 | End: 2020-01-10

## 2020-01-10 RX ORDER — ASPIRIN 325 MG
325 TABLET ORAL DAILY
Status: DISCONTINUED | OUTPATIENT
Start: 2020-01-11 | End: 2020-01-10

## 2020-01-10 RX ORDER — ASPIRIN 300 MG/1
300 SUPPOSITORY RECTAL DAILY
Status: DISCONTINUED | OUTPATIENT
Start: 2020-01-11 | End: 2020-01-10

## 2020-01-10 RX ORDER — VERAPAMIL HYDROCHLORIDE 2.5 MG/ML
INJECTION, SOLUTION INTRAVENOUS
Status: COMPLETED
Start: 2020-01-10 | End: 2020-01-10

## 2020-01-10 RX ORDER — CAPTOPRIL 12.5 MG/1
12.5 TABLET ORAL EVERY 8 HOURS
Status: DISCONTINUED | OUTPATIENT
Start: 2020-01-10 | End: 2020-01-11

## 2020-01-10 RX ORDER — EPTIFIBATIDE 2 MG/ML
INJECTION, SOLUTION INTRAVENOUS
Status: COMPLETED
Start: 2020-01-10 | End: 2020-01-10

## 2020-01-10 RX ORDER — ENALAPRILAT 1.25 MG/ML
1.25 INJECTION INTRAVENOUS EVERY 6 HOURS PRN
Status: DISCONTINUED | OUTPATIENT
Start: 2020-01-10 | End: 2020-01-14 | Stop reason: HOSPADM

## 2020-01-10 RX ORDER — LIDOCAINE HYDROCHLORIDE 20 MG/ML
INJECTION, SOLUTION INFILTRATION; PERINEURAL
Status: COMPLETED
Start: 2020-01-10 | End: 2020-01-10

## 2020-01-10 RX ORDER — NITROGLYCERIN 20 MG/100ML
0-200 INJECTION INTRAVENOUS CONTINUOUS
Status: DISCONTINUED | OUTPATIENT
Start: 2020-01-10 | End: 2020-01-11

## 2020-01-10 RX ORDER — PRASUGREL 10 MG/1
10 TABLET, FILM COATED ORAL DAILY
Status: DISCONTINUED | OUTPATIENT
Start: 2020-01-11 | End: 2020-01-14 | Stop reason: HOSPADM

## 2020-01-10 RX ORDER — MORPHINE SULFATE 4 MG/ML
2 INJECTION, SOLUTION INTRAMUSCULAR; INTRAVENOUS
Status: DISCONTINUED | OUTPATIENT
Start: 2020-01-10 | End: 2020-01-14 | Stop reason: HOSPADM

## 2020-01-10 RX ORDER — POTASSIUM CHLORIDE 7.45 MG/ML
10 INJECTION INTRAVENOUS
Status: COMPLETED | OUTPATIENT
Start: 2020-01-10 | End: 2020-01-10

## 2020-01-10 RX ORDER — ATORVASTATIN CALCIUM 80 MG/1
80 TABLET, FILM COATED ORAL EVERY EVENING
Status: DISCONTINUED | OUTPATIENT
Start: 2020-01-10 | End: 2020-01-14 | Stop reason: HOSPADM

## 2020-01-10 RX ORDER — ACETAMINOPHEN 325 MG/1
650 TABLET ORAL EVERY 6 HOURS PRN
Status: DISCONTINUED | OUTPATIENT
Start: 2020-01-10 | End: 2020-01-14 | Stop reason: HOSPADM

## 2020-01-10 RX ORDER — PRASUGREL 10 MG/1
TABLET, FILM COATED ORAL
Status: COMPLETED
Start: 2020-01-10 | End: 2020-01-10

## 2020-01-10 RX ORDER — ASPIRIN 81 MG/1
324 TABLET, CHEWABLE ORAL DAILY
Status: DISCONTINUED | OUTPATIENT
Start: 2020-01-11 | End: 2020-01-10

## 2020-01-10 RX ORDER — BIVALIRUDIN 250 MG/5ML
INJECTION, POWDER, LYOPHILIZED, FOR SOLUTION INTRAVENOUS
Status: COMPLETED
Start: 2020-01-10 | End: 2020-01-10

## 2020-01-10 RX ORDER — MORPHINE SULFATE 4 MG/ML
INJECTION, SOLUTION INTRAMUSCULAR; INTRAVENOUS
Status: ACTIVE
Start: 2020-01-10 | End: 2020-01-11

## 2020-01-10 RX ORDER — ASPIRIN 325 MG
325 TABLET ORAL ONCE
Status: COMPLETED | OUTPATIENT
Start: 2020-01-10 | End: 2020-01-10

## 2020-01-10 RX ORDER — ONDANSETRON 2 MG/ML
4 INJECTION INTRAMUSCULAR; INTRAVENOUS EVERY 4 HOURS PRN
Status: DISCONTINUED | OUTPATIENT
Start: 2020-01-10 | End: 2020-01-14 | Stop reason: HOSPADM

## 2020-01-10 RX ORDER — PROMETHAZINE HYDROCHLORIDE 25 MG/1
12.5-25 TABLET ORAL EVERY 4 HOURS PRN
Status: DISCONTINUED | OUTPATIENT
Start: 2020-01-10 | End: 2020-01-14 | Stop reason: HOSPADM

## 2020-01-10 RX ORDER — LABETALOL HYDROCHLORIDE 5 MG/ML
10 INJECTION, SOLUTION INTRAVENOUS EVERY 4 HOURS PRN
Status: DISCONTINUED | OUTPATIENT
Start: 2020-01-10 | End: 2020-01-14 | Stop reason: HOSPADM

## 2020-01-10 RX ORDER — PROMETHAZINE HYDROCHLORIDE 25 MG/1
12.5-25 SUPPOSITORY RECTAL EVERY 4 HOURS PRN
Status: DISCONTINUED | OUTPATIENT
Start: 2020-01-10 | End: 2020-01-14 | Stop reason: HOSPADM

## 2020-01-10 RX ORDER — FUROSEMIDE 10 MG/ML
40 INJECTION INTRAMUSCULAR; INTRAVENOUS ONCE
Status: COMPLETED | OUTPATIENT
Start: 2020-01-10 | End: 2020-01-10

## 2020-01-10 RX ORDER — PRASUGREL 10 MG/1
60 TABLET, FILM COATED ORAL ONCE
Status: DISCONTINUED | OUTPATIENT
Start: 2020-01-10 | End: 2020-01-10

## 2020-01-10 RX ORDER — AMOXICILLIN 250 MG
2 CAPSULE ORAL 2 TIMES DAILY
Status: DISCONTINUED | OUTPATIENT
Start: 2020-01-10 | End: 2020-01-14 | Stop reason: HOSPADM

## 2020-01-10 RX ORDER — MORPHINE SULFATE 4 MG/ML
2 INJECTION, SOLUTION INTRAMUSCULAR; INTRAVENOUS ONCE
Status: COMPLETED | OUTPATIENT
Start: 2020-01-10 | End: 2020-01-10

## 2020-01-10 RX ORDER — MORPHINE SULFATE 4 MG/ML
2 INJECTION, SOLUTION INTRAMUSCULAR; INTRAVENOUS
Status: DISCONTINUED | OUTPATIENT
Start: 2020-01-10 | End: 2020-01-10

## 2020-01-10 RX ORDER — FUROSEMIDE 10 MG/ML
20 INJECTION INTRAMUSCULAR; INTRAVENOUS
Status: DISCONTINUED | OUTPATIENT
Start: 2020-01-10 | End: 2020-01-11

## 2020-01-10 RX ORDER — BISACODYL 10 MG
10 SUPPOSITORY, RECTAL RECTAL
Status: DISCONTINUED | OUTPATIENT
Start: 2020-01-10 | End: 2020-01-14 | Stop reason: HOSPADM

## 2020-01-10 RX ORDER — HEPARIN SODIUM,PORCINE 1000/ML
VIAL (ML) INJECTION
Status: COMPLETED
Start: 2020-01-10 | End: 2020-01-10

## 2020-01-10 RX ORDER — PROCHLORPERAZINE EDISYLATE 5 MG/ML
5-10 INJECTION INTRAMUSCULAR; INTRAVENOUS EVERY 4 HOURS PRN
Status: DISCONTINUED | OUTPATIENT
Start: 2020-01-10 | End: 2020-01-14 | Stop reason: HOSPADM

## 2020-01-10 RX ORDER — MAGNESIUM SULFATE HEPTAHYDRATE 40 MG/ML
2 INJECTION, SOLUTION INTRAVENOUS ONCE
Status: COMPLETED | OUTPATIENT
Start: 2020-01-10 | End: 2020-01-10

## 2020-01-10 RX ORDER — HEPARIN SODIUM 200 [USP'U]/100ML
INJECTION, SOLUTION INTRAVENOUS
Status: COMPLETED
Start: 2020-01-10 | End: 2020-01-10

## 2020-01-10 RX ORDER — POTASSIUM CHLORIDE 20 MEQ/1
40 TABLET, EXTENDED RELEASE ORAL ONCE
Status: DISCONTINUED | OUTPATIENT
Start: 2020-01-10 | End: 2020-01-11

## 2020-01-10 RX ORDER — HEPARIN SODIUM 1000 [USP'U]/ML
6000 INJECTION, SOLUTION INTRAVENOUS; SUBCUTANEOUS ONCE
Status: DISCONTINUED | OUTPATIENT
Start: 2020-01-10 | End: 2020-01-10

## 2020-01-10 RX ORDER — IBUPROFEN 800 MG/1
800 TABLET ORAL EVERY 8 HOURS PRN
Status: ON HOLD | COMMUNITY
End: 2020-01-14

## 2020-01-10 RX ORDER — CYCLOBENZAPRINE HCL 10 MG
10 TABLET ORAL 3 TIMES DAILY PRN
Status: DISCONTINUED | OUTPATIENT
Start: 2020-01-10 | End: 2020-01-14 | Stop reason: HOSPADM

## 2020-01-10 RX ORDER — HEPARIN SODIUM 5000 [USP'U]/100ML
INJECTION, SOLUTION INTRAVENOUS CONTINUOUS
Status: DISCONTINUED | OUTPATIENT
Start: 2020-01-10 | End: 2020-01-10

## 2020-01-10 RX ORDER — CARVEDILOL 3.12 MG/1
3.12 TABLET ORAL 2 TIMES DAILY WITH MEALS
Status: DISCONTINUED | OUTPATIENT
Start: 2020-01-10 | End: 2020-01-12

## 2020-01-10 RX ORDER — LABETALOL HYDROCHLORIDE 5 MG/ML
20 INJECTION, SOLUTION INTRAVENOUS ONCE
Status: COMPLETED | OUTPATIENT
Start: 2020-01-10 | End: 2020-01-10

## 2020-01-10 RX ORDER — ADENOSINE 3 MG/ML
INJECTION, SOLUTION INTRAVENOUS
Status: COMPLETED
Start: 2020-01-10 | End: 2020-01-10

## 2020-01-10 RX ORDER — FLUTICASONE PROPIONATE AND SALMETEROL XINAFOATE 230; 21 UG/1; UG/1
1 AEROSOL, METERED RESPIRATORY (INHALATION) ONCE
Status: DISCONTINUED | OUTPATIENT
Start: 2020-01-10 | End: 2020-01-10

## 2020-01-10 RX ORDER — SODIUM CHLORIDE 9 MG/ML
INJECTION, SOLUTION INTRAVENOUS CONTINUOUS
Status: DISPENSED | OUTPATIENT
Start: 2020-01-10 | End: 2020-01-10

## 2020-01-10 RX ORDER — HEPARIN SODIUM 1000 [USP'U]/ML
3200 INJECTION, SOLUTION INTRAVENOUS; SUBCUTANEOUS PRN
Status: DISCONTINUED | OUTPATIENT
Start: 2020-01-10 | End: 2020-01-10

## 2020-01-10 RX ORDER — ONDANSETRON 4 MG/1
4 TABLET, ORALLY DISINTEGRATING ORAL EVERY 4 HOURS PRN
Status: DISCONTINUED | OUTPATIENT
Start: 2020-01-10 | End: 2020-01-14 | Stop reason: HOSPADM

## 2020-01-10 RX ORDER — POLYETHYLENE GLYCOL 3350 17 G/17G
1 POWDER, FOR SOLUTION ORAL
Status: DISCONTINUED | OUTPATIENT
Start: 2020-01-10 | End: 2020-01-14 | Stop reason: HOSPADM

## 2020-01-10 RX ORDER — CYCLOBENZAPRINE HCL 10 MG
10 TABLET ORAL 3 TIMES DAILY PRN
COMMUNITY
End: 2020-01-17

## 2020-01-10 RX ADMIN — LABETALOL HYDROCHLORIDE 10 MG: 5 INJECTION INTRAVENOUS at 12:11

## 2020-01-10 RX ADMIN — INSULIN HUMAN 1 UNITS: 100 INJECTION, SOLUTION PARENTERAL at 12:31

## 2020-01-10 RX ADMIN — NITROGLYCERIN 10 ML: 20 INJECTION INTRAVENOUS at 09:18

## 2020-01-10 RX ADMIN — MORPHINE SULFATE 2 MG: 4 INJECTION INTRAVENOUS at 14:06

## 2020-01-10 RX ADMIN — PRASUGREL 60 MG: 10 TABLET, FILM COATED ORAL at 10:18

## 2020-01-10 RX ADMIN — BIVALIRUDIN 250 MG: 250 INJECTION INTRACAVERNOUS at 10:24

## 2020-01-10 RX ADMIN — NITROGLYCERIN 20 MCG/MIN: 20 INJECTION INTRAVENOUS at 08:35

## 2020-01-10 RX ADMIN — POTASSIUM CHLORIDE 10 MEQ: 7.46 INJECTION, SOLUTION INTRAVENOUS at 17:46

## 2020-01-10 RX ADMIN — CAPTOPRIL 12.5 MG: 12.5 TABLET ORAL at 17:02

## 2020-01-10 RX ADMIN — HEPARIN SODIUM 2000 UNITS: 200 INJECTION, SOLUTION INTRAVENOUS at 09:18

## 2020-01-10 RX ADMIN — INSULIN HUMAN 1 UNITS: 100 INJECTION, SOLUTION PARENTERAL at 17:09

## 2020-01-10 RX ADMIN — FENTANYL CITRATE 100 MCG: 0.05 INJECTION, SOLUTION INTRAMUSCULAR; INTRAVENOUS at 09:30

## 2020-01-10 RX ADMIN — FUROSEMIDE 40 MG: 10 INJECTION, SOLUTION INTRAMUSCULAR; INTRAVENOUS at 12:49

## 2020-01-10 RX ADMIN — MORPHINE SULFATE 2 MG: 4 INJECTION INTRAVENOUS at 13:12

## 2020-01-10 RX ADMIN — MORPHINE SULFATE 2 MG: 4 INJECTION INTRAVENOUS at 12:54

## 2020-01-10 RX ADMIN — SODIUM CHLORIDE: 9 INJECTION, SOLUTION INTRAVENOUS at 13:21

## 2020-01-10 RX ADMIN — NITROGLYCERIN 20 MCG/MIN: 20 INJECTION INTRAVENOUS at 13:30

## 2020-01-10 RX ADMIN — FUROSEMIDE 20 MG: 10 INJECTION, SOLUTION INTRAMUSCULAR; INTRAVENOUS at 19:42

## 2020-01-10 RX ADMIN — FENTANYL CITRATE 100 MCG: 0.05 INJECTION, SOLUTION INTRAMUSCULAR; INTRAVENOUS at 10:24

## 2020-01-10 RX ADMIN — POTASSIUM CHLORIDE 10 MEQ: 7.46 INJECTION, SOLUTION INTRAVENOUS at 18:48

## 2020-01-10 RX ADMIN — CARVEDILOL 3.12 MG: 3.12 TABLET, FILM COATED ORAL at 17:01

## 2020-01-10 RX ADMIN — ADENOSINE 6 MG: 3 INJECTION, SOLUTION INTRAVENOUS at 10:23

## 2020-01-10 RX ADMIN — INSULIN HUMAN 1 UNITS: 100 INJECTION, SOLUTION PARENTERAL at 21:35

## 2020-01-10 RX ADMIN — POTASSIUM CHLORIDE 10 MEQ: 7.46 INJECTION, SOLUTION INTRAVENOUS at 16:09

## 2020-01-10 RX ADMIN — LIDOCAINE HYDROCHLORIDE: 20 INJECTION, SOLUTION INFILTRATION; PERINEURAL at 09:28

## 2020-01-10 RX ADMIN — ASPIRIN 325 MG: 325 TABLET, FILM COATED ORAL at 07:23

## 2020-01-10 RX ADMIN — MIDAZOLAM HYDROCHLORIDE 2 MG: 1 INJECTION, SOLUTION INTRAMUSCULAR; INTRAVENOUS at 10:24

## 2020-01-10 RX ADMIN — BIVALIRUDIN 250 MG: 250 INJECTION INTRACAVERNOUS at 09:29

## 2020-01-10 RX ADMIN — ONDANSETRON 4 MG: 2 INJECTION INTRAMUSCULAR; INTRAVENOUS at 16:35

## 2020-01-10 RX ADMIN — MIDAZOLAM HYDROCHLORIDE 2 MG: 1 INJECTION, SOLUTION INTRAMUSCULAR; INTRAVENOUS at 10:23

## 2020-01-10 RX ADMIN — HEPARIN SODIUM: 1000 INJECTION, SOLUTION INTRAVENOUS; SUBCUTANEOUS at 09:29

## 2020-01-10 RX ADMIN — ATORVASTATIN CALCIUM 80 MG: 80 TABLET, FILM COATED ORAL at 17:01

## 2020-01-10 RX ADMIN — LABETALOL HYDROCHLORIDE 20 MG: 5 INJECTION INTRAVENOUS at 07:39

## 2020-01-10 RX ADMIN — MIDAZOLAM HYDROCHLORIDE 2 MG: 1 INJECTION, SOLUTION INTRAMUSCULAR; INTRAVENOUS at 09:30

## 2020-01-10 RX ADMIN — MAGNESIUM SULFATE 2 G: 2 INJECTION INTRAVENOUS at 16:08

## 2020-01-10 RX ADMIN — CAPTOPRIL 12.5 MG: 12.5 TABLET ORAL at 21:38

## 2020-01-10 RX ADMIN — POTASSIUM CHLORIDE 10 MEQ: 7.46 INJECTION, SOLUTION INTRAVENOUS at 19:41

## 2020-01-10 RX ADMIN — VERAPAMIL HYDROCHLORIDE 2.5 MG: 2.5 INJECTION, SOLUTION INTRAVENOUS at 09:18

## 2020-01-10 RX ADMIN — EPTIFIBATIDE 20000 MCG: 2 INJECTION, SOLUTION INTRAVENOUS at 10:22

## 2020-01-10 RX ADMIN — NITROGLYCERIN 1 INCH: 20 OINTMENT TOPICAL at 12:47

## 2020-01-10 RX ADMIN — EPTIFIBATIDE 20000 MCG: 2 INJECTION, SOLUTION INTRAVENOUS at 10:23

## 2020-01-10 ASSESSMENT — ENCOUNTER SYMPTOMS
COUGH: 1
BLOOD IN STOOL: 0
DIZZINESS: 0
WHEEZING: 0
ABDOMINAL PAIN: 0
COUGH: 0
FEVER: 0
SORE THROAT: 1
FLANK PAIN: 0
HEMOPTYSIS: 0
CHILLS: 0
NECK PAIN: 0
CONSTIPATION: 0
VOMITING: 0
FEVER: 1
NAUSEA: 0
SHORTNESS OF BREATH: 1
BLURRED VISION: 0
BACK PAIN: 0
NERVOUS/ANXIOUS: 1
DEPRESSION: 0
DIARRHEA: 0
SPUTUM PRODUCTION: 0
PALPITATIONS: 0
ORTHOPNEA: 1
HEARTBURN: 0
HEADACHES: 0
FALLS: 0
MYALGIAS: 0
PND: 1
DOUBLE VISION: 0
CHILLS: 1

## 2020-01-10 ASSESSMENT — COPD QUESTIONNAIRES
DO YOU EVER COUGH UP ANY MUCUS OR PHLEGM?: NO/ONLY WITH OCCASIONAL COLDS OR INFECTIONS
COPD SCREENING SCORE: 2
HAVE YOU SMOKED AT LEAST 100 CIGARETTES IN YOUR ENTIRE LIFE: NO/DON'T KNOW
DURING THE PAST 4 WEEKS HOW MUCH DID YOU FEEL SHORT OF BREATH: MOST  OR ALL OF THE TIME

## 2020-01-10 ASSESSMENT — PAIN SCALES - WONG BAKER: WONGBAKER_NUMERICALRESPONSE: HURTS EVEN MORE

## 2020-01-10 ASSESSMENT — LIFESTYLE VARIABLES: EVER_SMOKED: NEVER

## 2020-01-10 NOTE — PROGRESS NOTES
2 RN skin check complete with Martha RN  Devices in place Zoll pads, Monitor leads, BP cuff ,oxygen with grey pads in place TR Band Swollen above TR Band PTA per [facility] as well.  Skin assessed under devices Intact.  Confirmed wound to L greater toe per pt infected for a couple of days (PTA) Picture taken uploaded into epic. R knee abrasion, L Thigh scratches healing PTA and bruising, Bottom pink and blanching with a red bump to r cheek  The following interventions in place Draw sheet, Q 2 hour turns, Grey foam for o2 switching arms every 2 hours for bp cuffandassesingskin.

## 2020-01-10 NOTE — ED TRIAGE NOTES
"Conner Case Perezfrank   59 y.o. male   Chief Complaint   Patient presents with   • Shortness of Breath     x3 days, though over the last few hours he has been having more difficulty in breathing   • Chest Pressure     middle of his chest, started after coughing very hard three days ago.       Pt amb to triage with steady gait for above complaint. EKG performed prior to triage. Lungs clear in triage.      Pt is alert and oriented, speaking in full sentences, follows commands and responds appropriately to questions. Resp are even and unlabored.   Pt placed in lobby. Pt educated on triage process. Pt encouraged to alert staff for any changes.    BP (!) 220/152 Comment: hx of htn, currently taking meds  Pulse (!) 108   Temp 36.7 °C (98.1 °F) (Oral)   Resp 20   Ht 1.778 m (5' 10\")   Wt 105.2 kg (231 lb 14.8 oz)   SpO2 94%   BMI 33.28 kg/m²     "

## 2020-01-10 NOTE — CONSULTS
Reason for Consult:  Asked by Dr Chang Thompson M.D. to see this patient with chest pain    CC:   Chief Complaint   Patient presents with   • Shortness of Breath     x3 days, though over the last few hours he has been having more difficulty in breathing   • Chest Pressure     middle of his chest, started after coughing very hard three days ago.        HPI:     59 year old man with PMH HTN presents with three days of intermittent chest pain described as typical angina radiating to jaw associated with shortness of breath. Today worse than prior which prompted visit to ED. He does also describe orthopnea, but denies ext swelling, palpitations, lh/dizziness, syncope.    Medications / Drug list prior to admission:  No current facility-administered medications on file prior to encounter.      Current Outpatient Medications on File Prior to Encounter   Medication Sig Dispense Refill   • ibuprofen (MOTRIN) 800 MG Tab Take 800 mg by mouth every 8 hours as needed for Moderate Pain.     • cyclobenzaprine (FLEXERIL) 10 MG Tab Take 10 mg by mouth 3 times a day as needed for Moderate Pain.     • Fluticasone-Salmeterol (ADVAIR HFA INH) Inhale 1 Puff by mouth Once.         Current list of administered Medications:    Current Facility-Administered Medications:   •  atorvastatin (LIPITOR) tablet 80 mg, 80 mg, Oral, Q EVENING, Bertram Mcmillan M.D.  •  nitroglycerin 50 mg in D5W 250 ml infusion, 0-200 mcg/min, Intravenous, Continuous, Bertram Mcmillan M.D., Last Rate: 6 mL/hr at 01/10/20 0835, 20 mcg/min at 01/10/20 0835  •  MD Alert...HEPARIN WEIGHT BASED PROTOCOL Pharmacist to implement, , Other, PRN, Bertram Mcmillan M.D.    Current Outpatient Medications:   •  ibuprofen (MOTRIN) 800 MG Tab, Take 800 mg by mouth every 8 hours as needed for Moderate Pain., Disp: , Rfl:   •  cyclobenzaprine (FLEXERIL) 10 MG Tab, Take 10 mg by mouth 3 times a day as needed for Moderate Pain., Disp: , Rfl:   •  Fluticasone-Salmeterol (ADVAIR HFA INH),  Inhale 1 Puff by mouth Once., Disp: , Rfl:     Past Medical History:   Diagnosis Date   • Hypertension        History reviewed. No pertinent surgical history.    No family history on file.  Patient family history was personally reviewed, no pertinent family history to current presentation    Social History     Socioeconomic History   • Marital status: Single     Spouse name: Not on file   • Number of children: Not on file   • Years of education: Not on file   • Highest education level: Not on file   Occupational History   • Not on file   Social Needs   • Financial resource strain: Not on file   • Food insecurity:     Worry: Not on file     Inability: Not on file   • Transportation needs:     Medical: Not on file     Non-medical: Not on file   Tobacco Use   • Smoking status: Never Smoker   • Smokeless tobacco: Never Used   Substance and Sexual Activity   • Alcohol use: Yes     Frequency: 2-4 times a month   • Drug use: Never   • Sexual activity: Yes     Partners: Female   Lifestyle   • Physical activity:     Days per week: Not on file     Minutes per session: Not on file   • Stress: Not on file   Relationships   • Social connections:     Talks on phone: Not on file     Gets together: Not on file     Attends Jewish service: Not on file     Active member of club or organization: Not on file     Attends meetings of clubs or organizations: Not on file     Relationship status: Not on file   • Intimate partner violence:     Fear of current or ex partner: Not on file     Emotionally abused: Not on file     Physically abused: Not on file     Forced sexual activity: Not on file   Other Topics Concern   • Not on file   Social History Narrative   • Not on file       ALLERGIES:  Allergies   Allergen Reactions   • Levofloxacin Unspecified     GI Bleeding         Review of systems:  A complete review of symptoms was reviewed with patient. This is reviewed in H&P and PMH. ALL OTHERS reviewed and negative    Physical  "exam:  Patient Vitals for the past 24 hrs:   BP Temp Temp src Pulse Resp SpO2 Height Weight   01/10/20 0829 (!) 165/128 -- -- 78 17 98 % -- --   01/10/20 0816 146/132 -- -- 70 19 99 % -- --   01/10/20 0801 (!) 161/113 -- -- 71 18 98 % -- --   01/10/20 0739 (!) 203/136 -- -- -- -- -- -- --   01/10/20 0700 -- -- -- -- -- -- 1.778 m (5' 10\") 105.2 kg (231 lb 14.8 oz)   01/10/20 0657 (!) 220/152 -- -- -- -- -- -- --   01/10/20 0656 (!) 212/140 36.7 °C (98.1 °F) Oral (!) 108 20 94 % -- --     General: No acute distress.   EYES: no jaundice  HEENT: OP clear   Neck: No bruits No JVD.   CVS:  RRR. S1 + S2. Diastolic murmur LSB. R/G. No edema.  Resp: CTAB. No wheezing or crackles/rhonchi.  Abdomen: Soft, NT, ND,  Skin: Grossly nothing acute no obvious rashes  Neurological: Alert, Moves all extremities, no cranial nerve defects on limited exam  Extremities: Pulse 2+ in b/l LE. No cyanosis.     Data:  Laboratory studies personally reviewed by me:  Recent Results (from the past 24 hour(s))   EKG    Collection Time: 01/10/20  6:59 AM   Result Value Ref Range    Report       [facility] Emergency Dept.    Test Date:  2020-01-10  Pt Name:    DEANNA DUEÑAS               Department: ER  MRN:        [id number]                      Room:  Gender:     Male                         Technician: [id number]  :        1960                   Requested By:ER TRIAGE PROTOCOL  Order #:    [id number]                    Reading MD: SIERRA LUCAS MD    Measurements  Intervals                                Axis  Rate:       98                           P:          -11  DE:         168                          QRS:        -60  QRSD:       106                          T:          106  QT:         391  QTc:        500    Interpretive Statements  Normal sinus rhythm 98 with a borderline widened QT corrected QT interval at  500  normal QRS left axis.  He does have ST elevation less than 1 mm in 1 and aVL  as  well as lateral lead " V6.  ST depressions are noted in 2 3 aVF mid anterior  leads  as well.  T wave inversions in 1 and aVL these are new changes fro m previous.  Electronically Signed On 1- 7:30:28 PST by SIERRA LUCAS MD     CBC WITH DIFFERENTIAL    Collection Time: 01/10/20  7:15 AM   Result Value Ref Range    WBC 14.3 (H) 4.8 - 10.8 K/uL    RBC 4.98 4.70 - 6.10 M/uL    Hemoglobin 15.7 14.0 - 18.0 g/dL    Hematocrit 45.7 42.0 - 52.0 %    MCV 91.8 81.4 - 97.8 fL    MCH 31.5 27.0 - 33.0 pg    MCHC 34.4 33.7 - 35.3 g/dL    RDW 41.0 35.9 - 50.0 fL    Platelet Count 184 164 - 446 K/uL    MPV 11.9 9.0 - 12.9 fL    Neutrophils-Polys 76.60 (H) 44.00 - 72.00 %    Lymphocytes 11.50 (L) 22.00 - 41.00 %    Monocytes 10.90 0.00 - 13.40 %    Eosinophils 0.10 0.00 - 6.90 %    Basophils 0.30 0.00 - 1.80 %    Immature Granulocytes 0.60 0.00 - 0.90 %    Nucleated RBC 0.00 /100 WBC    Neutrophils (Absolute) 10.96 (H) 1.82 - 7.42 K/uL    Lymphs (Absolute) 1.65 1.00 - 4.80 K/uL    Monos (Absolute) 1.56 (H) 0.00 - 0.85 K/uL    Eos (Absolute) 0.01 0.00 - 0.51 K/uL    Baso (Absolute) 0.04 0.00 - 0.12 K/uL    Immature Granulocytes (abs) 0.08 0.00 - 0.11 K/uL    NRBC (Absolute) 0.00 K/uL   TROPONIN    Collection Time: 01/10/20  7:15 AM   Result Value Ref Range    Troponin T 3000 (H) 6 - 19 ng/L   proBrain Natriuretic Peptide, NT    Collection Time: 01/10/20  7:15 AM   Result Value Ref Range    NT-proBNP 3207 (H) 0 - 125 pg/mL   LACTIC ACID    Collection Time: 01/10/20  7:15 AM   Result Value Ref Range    Lactic Acid 1.5 0.5 - 2.0 mmol/L   EKG (NOW)    Collection Time: 01/10/20  8:17 AM   Result Value Ref Range    Report       [facility] Emergency Dept.    Test Date:  2020-01-10  Pt Name:    DEANNA DUEÑAS               Department: ER  MRN:        [id number]                      Room:       [facility]  Gender:     Male                         Technician: [id number]  :        1960                   Requested By:SIERRA Lange  #:    [id number]                    Reading MD:    Measurements  Intervals                                Axis  Rate:       71                           P:          -11  KS:         172                          QRS:        -56  QRSD:       106                          T:          92  QT:         464  QTc:        505    Interpretive Statements  SINUS RHYTHM  LEFT ANTERIOR FASCICULAR BLOCK  LEFT VENTRICULAR HYPERTROPHY  PROLONGED QT INTERVAL  Compared to ECG 01/10/2020 06:59:47  Left anterior fascicular block now present  Left ventricular hypertrophy now present  Prolonged QT interval now present  ST (T wave) deviation no longer present         EKG : personally reviewed by me sinus, left axis, STD, LVH, LAE    All pertinent features of laboratory and imaging reviewed including primary images where applicable      Active Problems:    * No active hospital problems. *  Resolved Problems:    * No resolved hospital problems. *      Assessment / Plan:    59 year old man with PMH HTN presents with three days of intermittent chest pain described as typical angina radiating to jaw associated with shortness of breath found NSTEMI with persistent pain and HTN urgency.    -s/p labetalol, asa 325mg   -start nitro gtt  -hep gtt  -urgent LHC, will need root shot as well  -TTE  -admit to ICU    I personally discussed his case with Dr Thompson.    It is my pleasure to participate in the care of Mr. Fox.  Please do not hesitate to contact me with questions or concerns.    Bertram Mcmillan MD  Cardiologist [facility]

## 2020-01-10 NOTE — ASSESSMENT & PLAN NOTE
Likely perfusion, prerenal  Renal ultrasound with kidney stones, history of such, no obstruction  S/p gentle hydration  Faulkner has been removed and the patient is urinating

## 2020-01-10 NOTE — ED NOTES
"Medication reconciliation has been completed by interviewing patient with consent to do so with family/visitors in the room.   Allergies were reviewed and updated   No ORAL antibiotics within the past 14 days  Pt home pharmacy:[facility]    Pt reports that he does not have a prescription for ADVAIR he just \"aquired\" it and used it to see if it would help.         "

## 2020-01-10 NOTE — ED PROVIDER NOTES
ED Provider Note    Scribed for Chang Thompson M.D. by Madelyn Colon. 1/10/2020, 7:11 AM.    Primary care provider: Rahul De La Paz M.D.  Means of arrival: Walk-In  History obtained from: Patient  History limited by: None    CHIEF COMPLAINT  Chief Complaint   Patient presents with   • Shortness of Breath     x3 days, though over the last few hours he has been having more difficulty in breathing   • Chest Pressure     middle of his chest, started after coughing very hard three days ago.        HPI  Conner Fox is a 59 y.o. male, with a history of hypertension, who presents to the Emergency Department for worsening shortness of breath and pain on inspiration onset 3 days ago. Patient is additionally complaining of middle of chest pressure, productive cough, and sore throat. He reports fevers, chills, and night sweats that started last night. He notes his symptoms are worsened when he is laying on his back and improved when he is tripoding. He denies any nausea, vomiting, abdominal pain, leg pain, or edema. He denies use of smoking, illicit drugs or familial or personal history of heart disease.     REVIEW OF SYSTEMS  Review of Systems   Constitutional: Positive for chills and fever.   HENT: Positive for sore throat.    Respiratory: Positive for cough (productive) and shortness of breath.         Positive: pain with inspiration   Cardiovascular: Positive for chest pain.        Negative: lower extremity edema   Gastrointestinal: Negative for abdominal pain, nausea and vomiting.   Musculoskeletal:        Negative: leg pain   All other systems reviewed and are negative.      PAST MEDICAL HISTORY   has a past medical history of Hypertension.    SURGICAL HISTORY  patient denies any surgical history    SOCIAL HISTORY  Social History     Tobacco Use   • Smoking status: Never Smoker   • Smokeless tobacco: Never Used   Substance Use Topics   • Alcohol use: Yes     Frequency: 2-4 times a month   • Drug use:  "Never      Social History     Substance and Sexual Activity   Drug Use Never       FAMILY HISTORY  None noted.     CURRENT MEDICATIONS  Home Medications     Reviewed by Archie Mcallister (Pharmacy Tech) on 01/10/20 at 0808  Med List Status: Complete   Medication Last Dose Status   cyclobenzaprine (FLEXERIL) 10 MG Tab 1/7/2020 Active   Fluticasone-Salmeterol (ADVAIR HFA INH) 1/9/2020 Active   ibuprofen (MOTRIN) 800 MG Tab 1/9/2020 Active                ALLERGIES  Allergies   Allergen Reactions   • Levofloxacin Unspecified     GI Bleeding         PHYSICAL EXAM  VITAL SIGNS: BP (!) 220/152 Comment: hx of htn, currently taking meds  Pulse (!) 108   Temp 36.7 °C (98.1 °F) (Oral)   Resp 20   Ht 1.778 m (5' 10\")   Wt 105.2 kg (231 lb 14.8 oz)   SpO2 94%   BMI 33.28 kg/m²     Constitutional: Mildacute distress  HENT:  Moist mucous membranes  Eyes: No conjunctivitis or icterus  Neck: trachea is midline  Cardiovascular:  Regular rate and rhythm, no murmurs  Thorax & Lungs:  Normal breath sounds, no rhonchi  Abdomen: Soft, Non-tender  Skin:. no rash  Extremities:  no edema  Vascular: symmetric radial pulse  Neurologic: Normal gross motor     LABS  Labs Reviewed   CBC WITH DIFFERENTIAL - Abnormal; Notable for the following components:       Result Value    WBC 14.3 (*)     Neutrophils-Polys 76.60 (*)     Lymphocytes 11.50 (*)     Neutrophils (Absolute) 10.96 (*)     Monos (Absolute) 1.56 (*)     All other components within normal limits   COMP METABOLIC PANEL - Abnormal; Notable for the following components:    Sodium 134 (*)     Potassium 3.5 (*)     Anion Gap 13.0 (*)     Glucose 152 (*)     Bun 23 (*)     Creatinine 1.77 (*)     AST(SGOT) 152 (*)     All other components within normal limits   TROPONIN - Abnormal; Notable for the following components:    Troponin T 3000 (*)     All other components within normal limits   PROBRAIN NATRIURETIC PEPTIDE, NT - Abnormal; Notable for the following components:    NT-proBNP " 3207 (*)     All other components within normal limits   ESTIMATED GFR - Abnormal; Notable for the following components:    GFR If  47 (*)     GFR If Non  39 (*)     All other components within normal limits   LACTIC ACID   WESTERGREN SED RATE   CRP QUANTITIVE (NON-CARDIAC)   PROCALCITONIN   BLOOD CULTURE   BLOOD CULTURE   URINALYSIS   TSH   HEMOGLOBIN A1C   TROPONIN   TROPONIN   MAGNESIUM   PHOSPHORUS   URINE SODIUM RANDOM   URINE CREATININE RANDOM   HEPATITIS PANEL ACUTE(4 COMPONENTS)     All labs reviewed by me.    EKG Interpretation  Interpreted by me    Second EKG shows improved ST segments and no acute abnormalities.    RADIOLOGY  EC-ECHOCARDIOGRAM COMPLETE W/O CONT         CL-LEFT HEART CATHETERIZATION WITH POSSIBLE INTERVENTION   Final Result      DX-CHEST-PORTABLE (1 VIEW)   Final Result         1.  No acute cardiopulmonary disease.   2.  Cardiomegaly      US-RENAL    (Results Pending)     The radiologist's interpretation of all radiological studies have been reviewed by me.    COURSE & MEDICAL DECISION MAKING  Pertinent Labs & Imaging studies reviewed. (See chart for details)    7:11 AM - Patient seen and examined at bedside. I informed the patient the need for labs and radiology to rule out any emergent processes. Currently awaiting results before deciding if intervention is necessary. Patient verbalizes understanding and agreement to this plan of care. Patient will be treated with lipitor 80 mg, nitroglycerine 50 mg in  mL, aspirin tablet 325 mg, and labetalol injection 20 mg. Ordered CT-left heart catheterization with possible intervention, EC-echocardiogram complete w/o cont, Dx-chest, CBC with differential, CMP, troponin, proBrain natriuretic peptide, lactic acid, estimated GFR, and EKG to evaluate his symptoms. The differential diagnoses include but are not limited to: rule out MI, pericarditis, pneumonia .    7:18 AM - Paged Cardiology.     8:16 PM - I  discussed the patient's case and the above findings with Dr. Mcmillan (Cardiologsit) who agreed to consult the patient.    8:33 AM - Patient was reevaluated at bedside. Discussed lab and radiology results with the patient and informed them that he will be admited.    8:43 AM - I discussed the patient's case and the above findings with Dr. Connors (Hospitalist) who agreed to evaluate patient for hospitalization.     CRITICAL CARE  The very real possibility of a deterioration of this patient's condition required the highest level of my preparedness for sudden, emergent intervention.  I provided critical care services, which included medication orders, frequent reevaluations of the patient's condition and response to treatment, ordering and reviewing test results, and discussing the case with various consultants.  The critical care time associated with the care of the patient was 35 minutes. Review chart for interventions. This time is exclusive of any other billable procedures.     Medical Decision Making:   The patient did present with some chest pain and some other atypical symptoms with EKG changes showing lateral ST elevation that was nondiagnostic for STEMI but did appear in a lateral distribution.  He was having pain he was given aspirin.  He was given labetalol for his hypertension was rechecked and was minimally effective.  I have contacted the cardiologist.  A repeat EKG was obtained which shows improvement of the EKG abnormalities.  Is been determined that the patient is highly suspicious to have an end STEMI and is been taken to cardiac Cath Lab emergently.    DISPOSITION:  Patient will be hospitalized by Dr. Connors (Hospitalist) in guarded condition.    FINAL IMPRESSION  1. Chest pain, unspecified type    2. Hypertension, unspecified type       The critical care time associated with the care of the patient was 35 minutes.      I, Madelyn Colon ([place]), am scribing for, and in the presence of, Chang DUFF  TEOFILO Thompson.    Electronically signed by: Madelyn Colon (Scribe), 1/10/2020    I, Chang Thompson M.D. personally performed the services described in this documentation, as scribed by Madelyn Colon in my presence, and it is both accurate and complete. C.    The note accurately reflects work and decisions made by me.  Chang Thompson  1/10/2020  1:02 PM

## 2020-01-10 NOTE — PROGRESS NOTES
Patient has a diagnosis of nSTEMI. Notes say angiogram today. No cath report yet.    EF is unkown. No HF diagnosis has been given.      Below are the defect free care measures that must be met should patient continue with an ACS diagnosis after angiogram.     It is strongly recommended that if the patient gets a stent in cath lab today, that up until 1700 today, the bedside RN start the meds to beds process so that discharge is not delayed. Please do not call the on call pharmacist overnight to start the process unless of course, an order is placed to discharge the patient overnight.     ACS Measures:    1. ASA prescribed at discharge  2. Beta blockade prescribed at discharge, if patient also has HFrEF (EF less than or equal to 40%), this needs to be one of the three evidence based beta blockers: carvedilol, bisoprolol, Toprol XL  3. High intensity statin prescribed at discharge (atorvastatin 40 mg or rosuvastatin 20 mg)  4. ACE-I or ARB prescribed on discharge for LVEF less than 40%  5. Aldosterone blockade prescribed for patients with EF less than 40% AND history of diabetes mellitus OR history of heart failure, heart failure on presentation or heart failure as an in-hospital event  6. ICR referral order is placed  7. Use the Acute Coronary Syndrome discharge instructions to document that patient has been provided with the contact information for ICR   8. Evaluation of LV systolic function can be by angiogram, or echo before discharge, or within past year, cannot be a future plan for LVSF assessment  9. ACS education is documented daily  10. For any patient who receives a stent, initiate meds to beds: Get the outpatient order for the script from the physician, or the APRN:  • Medication  • Dose  • Route  • Quantity, how many pills in the bottle, (ie for Plavix this would be 30, since it’s once daily; Brilinta would be 60 since it’s twice daily)  • Refills, most physicians and APRN’s give 11 refills because the  patient usually needs to be on the med for one year  • Weekend: Call [phone]: ask for the on-call Anticoagulation Pharmacist to be paged.   • Weekday: call [phone] (anticoagulation clinic)     What if any of the above ACS Measures are contraindicated?    · Request that the discharging provider document the medication/intervention and the contraindication specifically in a progress note  · For example: “no ACE-I meds due to hypotension” is not enough. It needs to say: “No ACE-I, ARNI, ARB due to hypotension”; “No Beta Blockade due to bradycardia”…

## 2020-01-10 NOTE — ASSESSMENT & PLAN NOTE
Found to have a occlusion of the circumflex, status post drug-eluting stent placement  Second step angiography with distal stent placement  Maximize medical therapy  High intensity statin  Afterload reduction  Beta-blockade  Titrate as tolerated

## 2020-01-10 NOTE — ED NOTES
Cath team at bedside.  Report given to loren lanier.  All records with pt to cath lab.  Taken to cath lab by chavez with zoll by cath team and cardiology.

## 2020-01-10 NOTE — H&P
Hospital Medicine History & Physical Note    Date of Service  1/10/2020    Primary Care Physician  Rahul De La Paz M.D.    Consultants  Cardiology    Code Status  FULL CODE     Chief Complaint  Chest pain    History of Presenting Illness  59 y.o. male who presented 1/10/2020 with Chest pain     Patient presents to the emergency department today complaining of chest pain and shortness of breath.  Patient reports that over the last 3 to 4 days he is been experiencing shortness of breath.  He reports that he has had dyspnea on exertion, intermittent shortness of breath at rest.  In addition he has noticed minimal lower extremity edema, complaints of severe orthopnea and paroxysmal nocturnal dyspnea.  In addition he has had sub-central intermittent chest pain, which has been intermittently persistent over the last 3 to 4 days and really severe today.  He reported his chest pain is Central in location, pressure-like in character, today it was 10 out of 10 in intensity, radiating towards his jaw, noticed that the pain was worsening when he would change postures, did not notice any improving factors of this pain.  Associated nausea and diaphoresis.  Associated shortness of breath.  Has not noticed any palpitations, no syncopal episodes otherwise.  Denies any personal history of coronary artery disease, congestive heart failure.  No family history of coronary artery disease, congestive heart failure.  He denies any personal or family history of DVT/PE.    Review of Systems  Review of Systems   Constitutional: Positive for malaise/fatigue. Negative for chills and fever.   HENT: Negative for hearing loss and tinnitus.    Eyes: Negative for blurred vision and double vision.   Respiratory: Positive for shortness of breath. Negative for cough, hemoptysis, sputum production and wheezing.    Cardiovascular: Positive for orthopnea, leg swelling and PND. Negative for chest pain and palpitations.   Gastrointestinal: Negative for  abdominal pain, blood in stool, constipation, diarrhea, heartburn, melena, nausea and vomiting.   Genitourinary: Negative for dysuria, flank pain, frequency, hematuria and urgency.   Musculoskeletal: Negative for back pain, falls, joint pain, myalgias and neck pain.   Skin: Negative for itching and rash.   Neurological: Negative for dizziness and headaches.   Psychiatric/Behavioral: Negative for depression. The patient is nervous/anxious.        Past Medical History   has a past medical history of Hypertension.    Surgical History  Cholecystectomy per patient    Family History  Negative per patient, denies any family history of coronary artery disease, congestive heart failure, stroke, DVT/PE    Social History   reports that he has never smoked. He has never used smokeless tobacco. He reports current alcohol use. He reports that he does not use drugs.    Allergies  Allergies   Allergen Reactions   • Levofloxacin Unspecified     GI Bleeding         Medications  Prior to Admission Medications   Prescriptions Last Dose Informant Patient Reported? Taking?   Fluticasone-Salmeterol (ADVAIR HFA INH) 1/9/2020 at afternoon Patient Yes Yes   Sig: Inhale 1 Puff by mouth Once.   cyclobenzaprine (FLEXERIL) 10 MG Tab 1/7/2020 at prn Patient Yes Yes   Sig: Take 10 mg by mouth 3 times a day as needed for Moderate Pain.   ibuprofen (MOTRIN) 800 MG Tab 1/9/2020 at afternoon Patient Yes Yes   Sig: Take 800 mg by mouth every 8 hours as needed for Moderate Pain.      Facility-Administered Medications: None       Physical Exam  Temp:  [36.7 °C (98.1 °F)] 36.7 °C (98.1 °F)  Pulse:  [] 78  Resp:  [17-20] 17  BP: (146-220)/(113-152) 165/128  SpO2:  [94 %-99 %] 98 %    Physical Exam  HENT:      Head: Normocephalic.      Right Ear: External ear normal.      Left Ear: External ear normal.      Nose: Nose normal.      Mouth/Throat:      Mouth: Mucous membranes are moist.   Eyes:      General: No scleral icterus.     Conjunctiva/sclera:  Conjunctivae normal.      Pupils: Pupils are equal, round, and reactive to light.   Neck:      Musculoskeletal: Normal range of motion and neck supple.   Cardiovascular:      Rate and Rhythm: Normal rate and regular rhythm.      Pulses: Normal pulses.      Heart sounds: Murmur present. No friction rub. No gallop.    Pulmonary:      Effort: Pulmonary effort is normal. No respiratory distress.      Breath sounds: No stridor. Rales present. No wheezing or rhonchi.   Abdominal:      General: Abdomen is flat. Bowel sounds are normal. There is no distension.      Palpations: There is no mass.      Tenderness: There is no tenderness. There is no right CVA tenderness, left CVA tenderness, guarding or rebound.      Hernia: No hernia is present.   Musculoskeletal: Normal range of motion.      Right lower leg: Edema present.      Left lower leg: Edema present.      Comments: Minimal edema   Skin:     General: Skin is warm and dry.      Capillary Refill: Capillary refill takes less than 2 seconds.      Coloration: Skin is not jaundiced.      Findings: No erythema.   Neurological:      General: No focal deficit present.      Mental Status: He is alert and oriented to person, place, and time. Mental status is at baseline.   Psychiatric:         Mood and Affect: Mood normal.         Behavior: Behavior normal.         Thought Content: Thought content normal.         Judgment: Judgment normal.         Laboratory:  Recent Labs     01/10/20  0715   WBC 14.3*   RBC 4.98   HEMOGLOBIN 15.7   HEMATOCRIT 45.7   MCV 91.8   MCH 31.5   MCHC 34.4   RDW 41.0   PLATELETCT 184   MPV 11.9     Recent Labs     01/10/20  0715   SODIUM 134*   POTASSIUM 3.5*   CHLORIDE 99   CO2 22   GLUCOSE 152*   BUN 23*   CREATININE 1.77*   CALCIUM 9.4     Recent Labs     01/10/20  0715   ALTSGPT 46   ASTSGOT 152*   ALKPHOSPHAT 64   TBILIRUBIN 1.4   GLUCOSE 152*         Recent Labs     01/10/20  0715   NTPROBNP 3207*         Recent Labs     01/10/20  0715   TROPONINT  3000*       Urinalysis:    No results found     Imaging:  DX-CHEST-PORTABLE (1 VIEW)   Final Result         1.  No acute cardiopulmonary disease.   2.  Cardiomegaly      EC-ECHOCARDIOGRAM COMPLETE W/O CONT    (Results Pending)   CL-LEFT HEART CATHETERIZATION WITH POSSIBLE INTERVENTION    (Results Pending)   US-RENAL    (Results Pending)         Assessment/Plan:  I anticipate this patient will require at least two midnights for appropriate medical management, necessitating inpatient admission.    NSTEMI (non-ST elevated myocardial infarction) ([facility])- (present on admission)  Assessment & Plan  Nonspecific EKG on presentation with concerns for ischemia  Cardiology consulted  Initiated on heparin drip, aspirin 325, beta-blocker/ACE inhibitor, high intensity statin therapy  To check TSH, hemoglobin A1c and lipid profile  Plan to proceed with a coronary angiogram  Cardiology following  Obtain echocardiogram    Hypertensive urgency- (present on admission)  Assessment & Plan  Patient initiated on carvedilol, captopril  Titrate therapy as clinically appropriate  Echocardiogram requested  Plan for coronary angiogram  Cardiology team following  Will be admitted to the ICU in critical condition  Pulmonology/critical care to evaluate upon admission to the ICU    CRISELDA (acute kidney injury) ([facility])- (present on admission)  Assessment & Plan  Suspect cardiorenal  We will check urine sodium, creatinine  Will check ultrasound renal  Initiate gentle diuresis with Lasix 20 mg IV twice daily  Monitor renal function, electrolytes closely  Monitor renal function / Avoid nephrotoxins and dose medications renally    Hypokalemia- (present on admission)  Assessment & Plan  Replaced, monitor    Hyponatremia- (present on admission)  Assessment & Plan  Suspect hypervolemic, related to potential CHF  Echocardiogram pending  Check urine sodium, creatinine  Assess response to IV diuresis    Transaminitis- (present on admission)  Assessment &  Plan  Suspect alcoholism related, patient declined  We will monitor, interval CMP tomorrow  No evidence of right upper quadrant pain, negative Ledesma sign  Check hepatitis panel  Monitor for any signs of alcohol withdrawal, if concerning initiate CIWA protocol    Leukemoid reaction- (present on admission)  Assessment & Plan  Suspect reactive  We will check ESR, CRP and procalcitonin  Check blood cultures  Check urinalysis  Continue close clinical monitoring  Interval CBC tomorrow      VTE prophylaxis: IV heparin

## 2020-01-10 NOTE — CARE PLAN
Problem: Pain Management  Goal: Pain level will decrease to patient's comfort goal  Outcome: PROGRESSING AS EXPECTED  Flowsheets (Taken 1/10/2020 1519)  Non Verbal Scale : Calm  Pain Rating Scale (NPRS): 0  Note:   Educated pt importance of giving pain scale number, for medical team to appropriately treat his CP. Pt stated understanding and has done well with rating pain.

## 2020-01-10 NOTE — CONSULTS
Critical Care Consultation    Date of consult: 1/10/2020    Referring Physician  Jhonny Blum M.D.    Reason for Consultation  NSTEMI status post stents now with hypertension and chest pain  History of Presenting Illness  59 y.o. male who presented 1/10/2020 with chest pain.  He was found to have an elevated troponin I enzyme taken to the cardiac Cath Lab where he had occlusion of his mid circumflex that was successfully treated with PCI including a 3.0 x 3 mm GARRY placement.  He was brought back to the intensive care unit after stent placement and had ongoing/recurrent chest pain and hypertension.  I initiated nitroglycerin infusion and he received morphine with improvement in symptoms.  Echocardiogram showed ejection fraction of 40% with akinesis of the lateral wall and hypokinesis of the anterior wall.  Right heart pressures were moderately elevated.  Reviewed with cardiology at bedside, initiating captopril as well.    Code Status  Full Code    Review of Systems  Review of Systems   Unable to perform ROS: Acuity of condition       Past Medical History   has a past medical history of Hypertension.    Surgical History   has no past surgical history on file.    Family History  family history is not on file.    Social History   reports that he has never smoked. He has never used smokeless tobacco. He reports current alcohol use. He reports that he does not use drugs.    Medications  Home Medications     Reviewed by Archie Mcallister (Pharmacy Tech) on 01/10/20 at 0808  Med List Status: Complete   Medication Last Dose Status   cyclobenzaprine (FLEXERIL) 10 MG Tab 1/7/2020 Active   Fluticasone-Salmeterol (ADVAIR HFA INH) 1/9/2020 Active   ibuprofen (MOTRIN) 800 MG Tab 1/9/2020 Active              Current Facility-Administered Medications   Medication Dose Route Frequency Provider Last Rate Last Dose   • atorvastatin (LIPITOR) tablet 80 mg  80 mg Oral Q EVENING Bertram Mcmillan M.D.       • nitroglycerin 50 mg  in D5W 250 ml infusion  0-200 mcg/min Intravenous Continuous Bertramvioleta Mcmillan M.D. 12 mL/hr at 01/10/20 1408 40 mcg/min at 01/10/20 1408   • MD Alert...HEPARIN WEIGHT BASED PROTOCOL Pharmacist to implement   Other PRN Bertram Mcmillan M.D.       • senna-docusate (PERICOLACE or SENOKOT S) 8.6-50 MG per tablet 2 Tab  2 Tab Oral BID Andrew Connors M.D.        And   • polyethylene glycol/lytes (MIRALAX) PACKET 1 Packet  1 Packet Oral QDAY PRN Andrew Connors M.D.        And   • magnesium hydroxide (MILK OF MAGNESIA) suspension 30 mL  30 mL Oral QDAY PRN Andrew Connors M.D.        And   • bisacodyl (DULCOLAX) suppository 10 mg  10 mg Rectal QDAY PRN Andrew Connors M.D.       • Respiratory Care per Protocol   Nebulization Continuous RT Andrew Connors M.D.       • acetaminophen (TYLENOL) tablet 650 mg  650 mg Oral Q6HRS PRN Andrew Connors M.D.       • enalaprilat (VASOTEC) injection 1.25 mg  1.25 mg Intravenous Q6HRS PRN Andrew Connors M.D.       • labetalol (NORMODYNE/TRANDATE) injection 10 mg  10 mg Intravenous Q4HRS PRN Andrew Connors M.D.   10 mg at 01/10/20 1211   • ondansetron (ZOFRAN) syringe/vial injection 4 mg  4 mg Intravenous Q4HRS PRN Andrew Connors M.D.       • ondansetron (ZOFRAN ODT) dispertab 4 mg  4 mg Oral Q4HRS PRN Andrew Connors M.D.       • promethazine (PHENERGAN) tablet 12.5-25 mg  12.5-25 mg Oral Q4HRS PRN Andrew Connors M.D.       • promethazine (PHENERGAN) suppository 12.5-25 mg  12.5-25 mg Rectal Q4HRS PRN Andrew Connors M.D.       • prochlorperazine (COMPAZINE) injection 5-10 mg  5-10 mg Intravenous Q4HRS PRN Andrew Connors M.D.       • insulin regular (HUMULIN R) injection 1-6 Units  1-6 Units Subcutaneous 4X/DAY ACHS Andrew Connors M.D.   1 Units at 01/10/20 1231    And   • glucose 4 g chewable tablet 16 g  16 g Oral Q15 MIN PRN Andrew Connors M.D.        And   • DEXTROSE 10% BOLUS 250 mL  250 mL Intravenous Q15 MIN PRN Andrew Connors M.D.       • cyclobenzaprine (FLEXERIL) tablet 10 mg  10 mg Oral TID PRN Andrew Connors M.D.        • potassium chloride SA (Kdur) tablet 40 mEq  40 mEq Oral Once Andrew Connors M.D.       • captopril (CAPOTEN) tablet 12.5 mg  12.5 mg Oral Q8HRS Andrew Connors M.D.       • carvedilol (COREG) tablet 3.125 mg  3.125 mg Oral BID WITH MEALS Andrew Connors M.D.       • furosemide (LASIX) injection 20 mg  20 mg Intravenous BID DIURETIC Andrew Connors M.D.       • NS infusion   Intravenous Continuous Jamaal Beck M.D. 125 mL/hr at 01/10/20 1321     • [START ON 1/11/2020] aspirin EC (ECOTRIN) tablet 81 mg  81 mg Oral DAILY Jamaal Beck M.D.       • [START ON 1/11/2020] prasugrel (EFFIENT) tablet 10 mg  10 mg Oral DAILY Jamaal Beck M.D.       • nitroglycerin (NITRO-BID) 2 % ointment 1 Inch  1 Inch Topical Q6HRS Bertram Mcmillan M.D.   1 Inch at 01/10/20 1247   • morphine (pf) 4 mg/ml injection            • morphine (pf) 4 mg/ml injection        Stopped at 01/10/20 1315   • morphine (pf) 4 MG/ML injection 2 mg  2 mg Intravenous Q30 MIN PRN Jae Garcia M.D.   2 mg at 01/10/20 1406       Allergies  Allergies   Allergen Reactions   • Levofloxacin Unspecified     GI Bleeding         Vital Signs last 24 hours  Temp:  [36.1 °C (97 °F)-36.7 °C (98.1 °F)] 36.1 °C (97 °F)  Pulse:  [] 67  Resp:  [10-31] 15  BP: (118-220)/() 118/77  SpO2:  [93 %-99 %] 95 %    Physical Exam  Physical Exam  Vitals signs and nursing note reviewed.   Constitutional:       General: He is in acute distress.   HENT:      Head: Normocephalic and atraumatic.      Mouth/Throat:      Mouth: Mucous membranes are moist.   Eyes:      Pupils: Pupils are equal, round, and reactive to light.   Cardiovascular:      Rate and Rhythm: Regular rhythm.      Pulses: Normal pulses.   Pulmonary:      Effort: No respiratory distress.      Breath sounds: No wheezing.   Abdominal:      Palpations: Abdomen is soft.      Tenderness: There is no tenderness.   Musculoskeletal:         General: No tenderness or deformity.   Skin:     General: Skin is warm  and dry.      Capillary Refill: Capillary refill takes less than 2 seconds.   Neurological:      General: No focal deficit present.      Cranial Nerves: No cranial nerve deficit.         Fluids    Intake/Output Summary (Last 24 hours) at 1/10/2020 1547  Last data filed at 1/10/2020 1408  Gross per 24 hour   Intake 7.7 ml   Output 500 ml   Net -492.3 ml       Laboratory  Recent Results (from the past 48 hour(s))   EKG    Collection Time: 01/10/20  6:59 AM   Result Value Ref Range    Report       [facility] Emergency Dept.    Test Date:  2020-01-10  Pt Name:    DEANNA DUEÑAS               Department: ER  MRN:        [id number]                      Room:  Gender:     Male                         Technician: [id number]  :        1960                   Requested By:ER TRIAGE PROTOCOL  Order #:    [id number]                    Reading MD: SIERRA LUCAS MD    Measurements  Intervals                                Axis  Rate:       98                           P:          -11  NV:         168                          QRS:        -60  QRSD:       106                          T:          106  QT:         391  QTc:        500    Interpretive Statements  Normal sinus rhythm 98 with a borderline widened QT corrected QT interval at  500  normal QRS left axis.  He does have ST elevation less than 1 mm in 1 and aVL  as  well as lateral lead V6.  ST depressions are noted in 2 3 aVF mid anterior  leads  as well.  T wave inversions in 1 and aVL these are new changes fro m previous.  Electronically Signed On 1- 7:30:28 PST by SIERRA LUCAS MD     CBC WITH DIFFERENTIAL    Collection Time: 01/10/20  7:15 AM   Result Value Ref Range    WBC 14.3 (H) 4.8 - 10.8 K/uL    RBC 4.98 4.70 - 6.10 M/uL    Hemoglobin 15.7 14.0 - 18.0 g/dL    Hematocrit 45.7 42.0 - 52.0 %    MCV 91.8 81.4 - 97.8 fL    MCH 31.5 27.0 - 33.0 pg    MCHC 34.4 33.7 - 35.3 g/dL    RDW 41.0 35.9 - 50.0 fL    Platelet Count 184 164 - 446  K/uL    MPV 11.9 9.0 - 12.9 fL    Neutrophils-Polys 76.60 (H) 44.00 - 72.00 %    Lymphocytes 11.50 (L) 22.00 - 41.00 %    Monocytes 10.90 0.00 - 13.40 %    Eosinophils 0.10 0.00 - 6.90 %    Basophils 0.30 0.00 - 1.80 %    Immature Granulocytes 0.60 0.00 - 0.90 %    Nucleated RBC 0.00 /100 WBC    Neutrophils (Absolute) 10.96 (H) 1.82 - 7.42 K/uL    Lymphs (Absolute) 1.65 1.00 - 4.80 K/uL    Monos (Absolute) 1.56 (H) 0.00 - 0.85 K/uL    Eos (Absolute) 0.01 0.00 - 0.51 K/uL    Baso (Absolute) 0.04 0.00 - 0.12 K/uL    Immature Granulocytes (abs) 0.08 0.00 - 0.11 K/uL    NRBC (Absolute) 0.00 K/uL   COMP METABOLIC PANEL    Collection Time: 01/10/20  7:15 AM   Result Value Ref Range    Sodium 134 (L) 135 - 145 mmol/L    Potassium 3.5 (L) 3.6 - 5.5 mmol/L    Chloride 99 96 - 112 mmol/L    Co2 22 20 - 33 mmol/L    Anion Gap 13.0 (H) 0.0 - 11.9    Glucose 152 (H) 65 - 99 mg/dL    Bun 23 (H) 8 - 22 mg/dL    Creatinine 1.77 (H) 0.50 - 1.40 mg/dL    Calcium 9.4 8.5 - 10.5 mg/dL    AST(SGOT) 152 (H) 12 - 45 U/L    ALT(SGPT) 46 2 - 50 U/L    Alkaline Phosphatase 64 30 - 99 U/L    Total Bilirubin 1.4 0.1 - 1.5 mg/dL    Albumin 4.2 3.2 - 4.9 g/dL    Total Protein 7.5 6.0 - 8.2 g/dL    Globulin 3.3 1.9 - 3.5 g/dL    A-G Ratio 1.3 g/dL   TROPONIN    Collection Time: 01/10/20  7:15 AM   Result Value Ref Range    Troponin T 3000 (H) 6 - 19 ng/L   proBrain Natriuretic Peptide, NT    Collection Time: 01/10/20  7:15 AM   Result Value Ref Range    NT-proBNP 3207 (H) 0 - 125 pg/mL   LACTIC ACID    Collection Time: 01/10/20  7:15 AM   Result Value Ref Range    Lactic Acid 1.5 0.5 - 2.0 mmol/L   ESTIMATED GFR    Collection Time: 01/10/20  7:15 AM   Result Value Ref Range    GFR If  47 (A) >60 mL/min/1.73 m 2    GFR If Non  39 (A) >60 mL/min/1.73 m 2   EKG (NOW)    Collection Time: 01/10/20  8:17 AM   Result Value Ref Range    Report       [facility] Emergency Dept.    Test Date:   2020-01-10  Pt Name:    DEANNA DUEÑAS               Department: ER  MRN:        [id number]                      Room:        03  Gender:     Male                         Technician: [id number]  :        1960                   Requested By:SIERRA LUCAS  Order #:    [id number]                    Reading MD:    Measurements  Intervals                                Axis  Rate:       71                           P:          -11  CT:         172                          QRS:        -56  QRSD:       106                          T:          92  QT:         464  QTc:        505    Interpretive Statements  SINUS RHYTHM  LEFT ANTERIOR FASCICULAR BLOCK  LEFT VENTRICULAR HYPERTROPHY  PROLONGED QT INTERVAL  Compared to ECG 01/10/2020 06:59:47  Left anterior fascicular block now present  Left ventricular hypertrophy now present  Prolonged QT interval now present  ST (T wave) deviation no longer present     EKG STAT    Collection Time: 01/10/20 11:04 AM   Result Value Ref Range    Report       [facility]    Test Date:  2020-01-10  Pt Name:    DEANNA DUEÑAS               Department: [facility]  MRN:        [id number]                      Room:       T628  Gender:     Male                         Technician: DLH  :        1960                   Requested By:MARILOU GASTELUM  Order #:    [id number]                    Reading MD: Kvng Melgar MD    Measurements  Intervals                                Axis  Rate:       70                           P:          -22  CT:         160                          QRS:        -56  QRSD:       102                          T:          101  QT:         480  QTc:        519    Interpretive Statements  SINUS RHYTHM  ATRIAL PREMATURE COMPLEX  LEFT ANTERIOR FASCICULAR BLOCK  ST ELEVATION, PROBABLE LATERAL INJURY  PROLONGED QT INTERVAL  Electronically Signed On 1- 15:26:20 PST by Kvng Melgar MD     ACCU-CHEK GLUCOSE    Collection Time: 01/10/20 12:24 PM   Result Value  Ref Range    Glucose - Accu-Ck 161 (H) 65 - 99 mg/dL   EKG    Collection Time: 01/10/20 12:26 PM   Result Value Ref Range    Report       [facility]    Test Date:  2020-01-10  Pt Name:    DEANNA DUEÑAS               Department: 161  MRN:        [id number]                      Room:       T628  Gender:     Male                         Technician: [facility]  :        1960                   Requested By:KENISHA PARRA  Order #:    [id number]                    Reading MD: Kvng Melgar MD    Measurements  Intervals                                Axis  Rate:       67                           P:          -16  NY:         160                          QRS:        -57  QRSD:       104                          T:          135  QT:         480  QTc:        507    Interpretive Statements  SINUS RHYTHM  LEFT ANTERIOR FASCICULAR BLOCK  ST ELEVATION, PROBABLE LATERAL INJURY  PROLONGED QT INTERVAL  Electronically Signed On 1- 15:30:40 PST by Kvng Melgar MD     EC-ECHOCARDIOGRAM COMPLETE W/O CONT    Collection Time: 01/10/20 12:38 PM   Result Value Ref Range    Eject.Frac. MOD BP 41.64     Eject.Frac. MOD 4C 47     Eject.Frac. MOD 2C 35.81     Left Ventrical Ejection Fraction 40    EKG    Collection Time: 01/10/20  1:04 PM   Result Value Ref Range    Report       [facility]    Test Date:  2020-01-10  Pt Name:    DEANNA DUEÑAS               Department: 161  MRN:        [id number]                      Room:       T628  Gender:     Male                         Technician: [facility]  :        1960                   Requested By:ORTIZ WALKER  Order #:    [id number]                    Reading MD: Kvng Melgar MD    Measurements  Intervals                                Axis  Rate:       67                           P:          -11  NY:         168                          QRS:        -58  QRSD:       102                          T:          69  QT:         492  QTc:        520    Interpretive  Statements  SINUS RHYTHM  LEFT ANTERIOR FASCICULAR BLOCK  MILD LATERAL ST ELEVATION, LATERAL LEADS  PROLONGED QT INTERVAL  Electronically Signed On 1- 15:38:45 PST by Kvng Melgar MD     TROPONIN    Collection Time: 01/10/20  1:20 PM   Result Value Ref Range    Troponin T 5335 (H) 6 - 19 ng/L   Comp Metabolic Panel    Collection Time: 01/10/20  1:20 PM   Result Value Ref Range    Sodium 134 (L) 135 - 145 mmol/L    Potassium 3.3 (L) 3.6 - 5.5 mmol/L    Chloride 100 96 - 112 mmol/L    Co2 21 20 - 33 mmol/L    Anion Gap 13.0 (H) 0.0 - 11.9    Glucose 162 (H) 65 - 99 mg/dL    Bun 22 8 - 22 mg/dL    Creatinine 1.56 (H) 0.50 - 1.40 mg/dL    Calcium 8.7 8.5 - 10.5 mg/dL    AST(SGOT) 134 (H) 12 - 45 U/L    ALT(SGPT) 39 2 - 50 U/L    Alkaline Phosphatase 58 30 - 99 U/L    Total Bilirubin 1.3 0.1 - 1.5 mg/dL    Albumin 3.8 3.2 - 4.9 g/dL    Total Protein 6.7 6.0 - 8.2 g/dL    Globulin 2.9 1.9 - 3.5 g/dL    A-G Ratio 1.3 g/dL   CBC WITH DIFFERENTIAL    Collection Time: 01/10/20  1:20 PM   Result Value Ref Range    WBC 14.1 (H) 4.8 - 10.8 K/uL    RBC 4.53 (L) 4.70 - 6.10 M/uL    Hemoglobin 14.4 14.0 - 18.0 g/dL    Hematocrit 41.8 (L) 42.0 - 52.0 %    MCV 92.3 81.4 - 97.8 fL    MCH 31.8 27.0 - 33.0 pg    MCHC 34.4 33.7 - 35.3 g/dL    RDW 41.1 35.9 - 50.0 fL    Platelet Count 181 164 - 446 K/uL    MPV 12.2 9.0 - 12.9 fL    Neutrophils-Polys 82.70 (H) 44.00 - 72.00 %    Lymphocytes 6.50 (L) 22.00 - 41.00 %    Monocytes 10.20 0.00 - 13.40 %    Eosinophils 0.00 0.00 - 6.90 %    Basophils 0.10 0.00 - 1.80 %    Immature Granulocytes 0.50 0.00 - 0.90 %    Nucleated RBC 0.00 /100 WBC    Neutrophils (Absolute) 11.66 (H) 1.82 - 7.42 K/uL    Lymphs (Absolute) 0.92 (L) 1.00 - 4.80 K/uL    Monos (Absolute) 1.44 (H) 0.00 - 0.85 K/uL    Eos (Absolute) 0.00 0.00 - 0.51 K/uL    Baso (Absolute) 0.02 0.00 - 0.12 K/uL    Immature Granulocytes (abs) 0.07 0.00 - 0.11 K/uL    NRBC (Absolute) 0.00 K/uL   MAGNESIUM    Collection Time:  01/10/20  1:20 PM   Result Value Ref Range    Magnesium 1.7 1.5 - 2.5 mg/dL   ESTIMATED GFR    Collection Time: 01/10/20  1:20 PM   Result Value Ref Range    GFR If  55 (A) >60 mL/min/1.73 m 2    GFR If Non African American 45 (A) >60 mL/min/1.73 m 2       Imaging  US-RENAL   Final Result         1.  Bilateral nephrolithiasis, no hydronephrosis or obstructive changes are appreciated.   2.  Left upper pole renal cyst.      DX-CHEST-PORTABLE (1 VIEW)   Final Result      No acute cardiopulmonary abnormality identified.      EC-ECHOCARDIOGRAM COMPLETE W/O CONT   Final Result      CL-LEFT HEART CATHETERIZATION WITH POSSIBLE INTERVENTION   Final Result      DX-CHEST-PORTABLE (1 VIEW)   Final Result         1.  No acute cardiopulmonary disease.   2.  Cardiomegaly          Assessment/Plan  NSTEMI (non-ST elevated myocardial infarction) (HCC)- (present on admission)  Assessment & Plan  With total occlusion of circumflex, status post PCI and GARRY placement 1/10  Dual antiplatelet therapy, medications reviewed  Recurrent chest pain, thought secondary to PCI with some distal thrombus, now resolved with blood pressure control and morphine, follow closely, EKG, cardiology following    Hypertensive urgency- (present on admission)  Assessment & Plan  Nitroglycerin initiated, titrate as needed, oral agents reviewed    CRISELDA (acute kidney injury) (HCC)- (present on admission)  Assessment & Plan  Hold diuretic, urinalysis, follow renal function and avoid nephrotoxic agents    Hypokalemia- (present on admission)  Assessment & Plan  Follow and replace as needed    Patient is critically ill.  He will be followed very closely in the intensive care unit in the immediate post MI stent..  I will manage blood pressure and review medications with cardiology.  Discussed patient condition and risk of morbidity and/or mortality with Hospitalist, RN, RT, Pharmacy and Interventional cardiologist.    The patient remains critically ill.   Critical care time = 32 minutes in directly providing and coordinating critical care and extensive data review.  No time overlap and excludes procedures.

## 2020-01-11 ENCOUNTER — APPOINTMENT (OUTPATIENT)
Dept: RADIOLOGY | Facility: MEDICAL CENTER | Age: 60
DRG: 246 | End: 2020-01-11
Attending: INTERNAL MEDICINE
Payer: COMMERCIAL

## 2020-01-11 ENCOUNTER — APPOINTMENT (OUTPATIENT)
Dept: CARDIOLOGY | Facility: MEDICAL CENTER | Age: 60
DRG: 246 | End: 2020-01-11
Attending: INTERNAL MEDICINE
Payer: COMMERCIAL

## 2020-01-11 LAB
ALBUMIN SERPL BCP-MCNC: 3.4 G/DL (ref 3.2–4.9)
ALBUMIN/GLOB SERPL: 1.3 G/DL
ALP SERPL-CCNC: 51 U/L (ref 30–99)
ALT SERPL-CCNC: 30 U/L (ref 2–50)
ANION GAP SERPL CALC-SCNC: 10 MMOL/L (ref 0–11.9)
APPEARANCE UR: ABNORMAL
AST SERPL-CCNC: 89 U/L (ref 12–45)
BACTERIA #/AREA URNS HPF: NEGATIVE /HPF
BASOPHILS # BLD AUTO: 0.1 % (ref 0–1.8)
BASOPHILS # BLD: 0.01 K/UL (ref 0–0.12)
BILIRUB SERPL-MCNC: 1.1 MG/DL (ref 0.1–1.5)
BILIRUB UR QL STRIP.AUTO: NEGATIVE
BUN SERPL-MCNC: 25 MG/DL (ref 8–22)
CALCIUM SERPL-MCNC: 8.2 MG/DL (ref 8.5–10.5)
CHLORIDE SERPL-SCNC: 100 MMOL/L (ref 96–112)
CHOLEST SERPL-MCNC: 136 MG/DL (ref 100–199)
CO2 SERPL-SCNC: 23 MMOL/L (ref 20–33)
COLOR UR: YELLOW
CREAT SERPL-MCNC: 1.85 MG/DL (ref 0.5–1.4)
EKG IMPRESSION: NORMAL
EOSINOPHIL # BLD AUTO: 0.01 K/UL (ref 0–0.51)
EOSINOPHIL NFR BLD: 0.1 % (ref 0–6.9)
EPI CELLS #/AREA URNS HPF: NEGATIVE /HPF
ERYTHROCYTE [DISTWIDTH] IN BLOOD BY AUTOMATED COUNT: 41.9 FL (ref 35.9–50)
GLOBULIN SER CALC-MCNC: 2.6 G/DL (ref 1.9–3.5)
GLUCOSE BLD-MCNC: 148 MG/DL (ref 65–99)
GLUCOSE BLD-MCNC: 180 MG/DL (ref 65–99)
GLUCOSE SERPL-MCNC: 174 MG/DL (ref 65–99)
GLUCOSE UR STRIP.AUTO-MCNC: NEGATIVE MG/DL
HCT VFR BLD AUTO: 37.7 % (ref 42–52)
HDLC SERPL-MCNC: 55 MG/DL
HGB BLD-MCNC: 13 G/DL (ref 14–18)
HYALINE CASTS #/AREA URNS LPF: ABNORMAL /LPF
IMM GRANULOCYTES # BLD AUTO: 0.06 K/UL (ref 0–0.11)
IMM GRANULOCYTES NFR BLD AUTO: 0.5 % (ref 0–0.9)
KETONES UR STRIP.AUTO-MCNC: NEGATIVE MG/DL
LDLC SERPL CALC-MCNC: 66 MG/DL
LEUKOCYTE ESTERASE UR QL STRIP.AUTO: ABNORMAL
LYMPHOCYTES # BLD AUTO: 1.07 K/UL (ref 1–4.8)
LYMPHOCYTES NFR BLD: 8.3 % (ref 22–41)
MCH RBC QN AUTO: 32 PG (ref 27–33)
MCHC RBC AUTO-ENTMCNC: 34.5 G/DL (ref 33.7–35.3)
MCV RBC AUTO: 92.9 FL (ref 81.4–97.8)
MICRO URNS: ABNORMAL
MONOCYTES # BLD AUTO: 1.51 K/UL (ref 0–0.85)
MONOCYTES NFR BLD AUTO: 11.7 % (ref 0–13.4)
NEUTROPHILS # BLD AUTO: 10.24 K/UL (ref 1.82–7.42)
NEUTROPHILS NFR BLD: 79.3 % (ref 44–72)
NITRITE UR QL STRIP.AUTO: NEGATIVE
NRBC # BLD AUTO: 0 K/UL
NRBC BLD-RTO: 0 /100 WBC
PH UR STRIP.AUTO: 5 [PH] (ref 5–8)
PLATELET # BLD AUTO: 168 K/UL (ref 164–446)
PMV BLD AUTO: 12.3 FL (ref 9–12.9)
POTASSIUM SERPL-SCNC: 3.3 MMOL/L (ref 3.6–5.5)
PROT SERPL-MCNC: 6 G/DL (ref 6–8.2)
PROT UR QL STRIP: 100 MG/DL
RBC # BLD AUTO: 4.06 M/UL (ref 4.7–6.1)
RBC # URNS HPF: >150 /HPF
RBC UR QL AUTO: ABNORMAL
SODIUM SERPL-SCNC: 133 MMOL/L (ref 135–145)
SP GR UR STRIP.AUTO: 1.04
TRIGL SERPL-MCNC: 76 MG/DL (ref 0–149)
TROPONIN T SERPL-MCNC: 3486 NG/L (ref 6–19)
TROPONIN T SERPL-MCNC: 3798 NG/L (ref 6–19)
TROPONIN T SERPL-MCNC: 3860 NG/L (ref 6–19)
UROBILINOGEN UR STRIP.AUTO-MCNC: 0.2 MG/DL
WBC # BLD AUTO: 12.9 K/UL (ref 4.8–10.8)
WBC #/AREA URNS HPF: ABNORMAL /HPF

## 2020-01-11 PROCEDURE — 85025 COMPLETE CBC W/AUTO DIFF WBC: CPT

## 2020-01-11 PROCEDURE — A9270 NON-COVERED ITEM OR SERVICE: HCPCS | Performed by: INTERNAL MEDICINE

## 2020-01-11 PROCEDURE — 770022 HCHG ROOM/CARE - ICU (200)

## 2020-01-11 PROCEDURE — 027034Z DILATION OF CORONARY ARTERY, ONE ARTERY WITH DRUG-ELUTING INTRALUMINAL DEVICE, PERCUTANEOUS APPROACH: ICD-10-PCS | Performed by: INTERNAL MEDICINE

## 2020-01-11 PROCEDURE — 700111 HCHG RX REV CODE 636 W/ 250 OVERRIDE (IP): Performed by: INTERNAL MEDICINE

## 2020-01-11 PROCEDURE — 84484 ASSAY OF TROPONIN QUANT: CPT | Mod: 91

## 2020-01-11 PROCEDURE — 93010 ELECTROCARDIOGRAM REPORT: CPT | Mod: 76 | Performed by: INTERNAL MEDICINE

## 2020-01-11 PROCEDURE — 51798 US URINE CAPACITY MEASURE: CPT

## 2020-01-11 PROCEDURE — 93010 ELECTROCARDIOGRAM REPORT: CPT | Performed by: INTERNAL MEDICINE

## 2020-01-11 PROCEDURE — 80061 LIPID PANEL: CPT

## 2020-01-11 PROCEDURE — 99233 SBSQ HOSP IP/OBS HIGH 50: CPT | Mod: 25 | Performed by: INTERNAL MEDICINE

## 2020-01-11 PROCEDURE — 700102 HCHG RX REV CODE 250 W/ 637 OVERRIDE(OP): Performed by: INTERNAL MEDICINE

## 2020-01-11 PROCEDURE — 700102 HCHG RX REV CODE 250 W/ 637 OVERRIDE(OP): Performed by: HOSPITALIST

## 2020-01-11 PROCEDURE — 99153 MOD SED SAME PHYS/QHP EA: CPT

## 2020-01-11 PROCEDURE — 93005 ELECTROCARDIOGRAM TRACING: CPT | Performed by: INTERNAL MEDICINE

## 2020-01-11 PROCEDURE — 76775 US EXAM ABDO BACK WALL LIM: CPT

## 2020-01-11 PROCEDURE — 81001 URINALYSIS AUTO W/SCOPE: CPT

## 2020-01-11 PROCEDURE — 80053 COMPREHEN METABOLIC PANEL: CPT

## 2020-01-11 PROCEDURE — 700101 HCHG RX REV CODE 250: Performed by: INTERNAL MEDICINE

## 2020-01-11 PROCEDURE — 700101 HCHG RX REV CODE 250

## 2020-01-11 PROCEDURE — 71045 X-RAY EXAM CHEST 1 VIEW: CPT

## 2020-01-11 PROCEDURE — 700111 HCHG RX REV CODE 636 W/ 250 OVERRIDE (IP)

## 2020-01-11 PROCEDURE — 82962 GLUCOSE BLOOD TEST: CPT

## 2020-01-11 PROCEDURE — 700117 HCHG RX CONTRAST REV CODE 255: Performed by: INTERNAL MEDICINE

## 2020-01-11 PROCEDURE — 700101 HCHG RX REV CODE 250: Performed by: HOSPITALIST

## 2020-01-11 PROCEDURE — B2101ZZ FLUOROSCOPY OF SINGLE CORONARY ARTERY USING LOW OSMOLAR CONTRAST: ICD-10-PCS | Performed by: INTERNAL MEDICINE

## 2020-01-11 PROCEDURE — 99291 CRITICAL CARE FIRST HOUR: CPT | Performed by: INTERNAL MEDICINE

## 2020-01-11 PROCEDURE — 92928 PRQ TCAT PLMT NTRAC ST 1 LES: CPT | Mod: LC | Performed by: INTERNAL MEDICINE

## 2020-01-11 PROCEDURE — 99152 MOD SED SAME PHYS/QHP 5/>YRS: CPT | Performed by: INTERNAL MEDICINE

## 2020-01-11 PROCEDURE — A9270 NON-COVERED ITEM OR SERVICE: HCPCS | Performed by: HOSPITALIST

## 2020-01-11 PROCEDURE — 93454 CORONARY ARTERY ANGIO S&I: CPT | Mod: 26,59 | Performed by: INTERNAL MEDICINE

## 2020-01-11 PROCEDURE — 99233 SBSQ HOSP IP/OBS HIGH 50: CPT | Performed by: HOSPITALIST

## 2020-01-11 RX ORDER — CAPTOPRIL 12.5 MG/1
12.5 TABLET ORAL TWICE DAILY
Status: DISCONTINUED | OUTPATIENT
Start: 2020-01-11 | End: 2020-01-11

## 2020-01-11 RX ORDER — POTASSIUM CHLORIDE 20 MEQ/1
20 TABLET, EXTENDED RELEASE ORAL 2 TIMES DAILY
Status: COMPLETED | OUTPATIENT
Start: 2020-01-11 | End: 2020-01-12

## 2020-01-11 RX ORDER — SODIUM CHLORIDE AND POTASSIUM CHLORIDE 150; 900 MG/100ML; MG/100ML
1000 INJECTION, SOLUTION INTRAVENOUS ONCE
Status: COMPLETED | OUTPATIENT
Start: 2020-01-11 | End: 2020-01-11

## 2020-01-11 RX ORDER — POTASSIUM CHLORIDE 20 MEQ/1
20 TABLET, EXTENDED RELEASE ORAL ONCE
Status: COMPLETED | OUTPATIENT
Start: 2020-01-11 | End: 2020-01-11

## 2020-01-11 RX ORDER — HEPARIN SODIUM,PORCINE 1000/ML
VIAL (ML) INJECTION
Status: COMPLETED
Start: 2020-01-11 | End: 2020-01-11

## 2020-01-11 RX ORDER — SODIUM CHLORIDE 9 MG/ML
INJECTION, SOLUTION INTRAVENOUS CONTINUOUS
Status: ACTIVE | OUTPATIENT
Start: 2020-01-11 | End: 2020-01-11

## 2020-01-11 RX ORDER — FUROSEMIDE 10 MG/ML
20 INJECTION INTRAMUSCULAR; INTRAVENOUS ONCE
Status: COMPLETED | OUTPATIENT
Start: 2020-01-11 | End: 2020-01-11

## 2020-01-11 RX ORDER — TAMSULOSIN HYDROCHLORIDE 0.4 MG/1
0.4 CAPSULE ORAL
Status: DISCONTINUED | OUTPATIENT
Start: 2020-01-11 | End: 2020-01-14 | Stop reason: HOSPADM

## 2020-01-11 RX ORDER — LISINOPRIL 5 MG/1
5 TABLET ORAL
Status: DISCONTINUED | OUTPATIENT
Start: 2020-01-12 | End: 2020-01-12

## 2020-01-11 RX ORDER — BIVALIRUDIN 250 MG/5ML
INJECTION, POWDER, LYOPHILIZED, FOR SOLUTION INTRAVENOUS
Status: COMPLETED
Start: 2020-01-11 | End: 2020-01-11

## 2020-01-11 RX ORDER — VERAPAMIL HYDROCHLORIDE 2.5 MG/ML
INJECTION, SOLUTION INTRAVENOUS
Status: COMPLETED
Start: 2020-01-11 | End: 2020-01-11

## 2020-01-11 RX ORDER — HEPARIN SODIUM 200 [USP'U]/100ML
INJECTION, SOLUTION INTRAVENOUS
Status: COMPLETED
Start: 2020-01-11 | End: 2020-01-11

## 2020-01-11 RX ORDER — LIDOCAINE HYDROCHLORIDE 20 MG/ML
INJECTION, SOLUTION INFILTRATION; PERINEURAL
Status: COMPLETED
Start: 2020-01-11 | End: 2020-01-11

## 2020-01-11 RX ORDER — SPIRONOLACTONE 25 MG/1
25 TABLET ORAL
Status: DISCONTINUED | OUTPATIENT
Start: 2020-01-11 | End: 2020-01-14 | Stop reason: HOSPADM

## 2020-01-11 RX ORDER — MIDAZOLAM HYDROCHLORIDE 1 MG/ML
INJECTION INTRAMUSCULAR; INTRAVENOUS
Status: COMPLETED
Start: 2020-01-11 | End: 2020-01-11

## 2020-01-11 RX ADMIN — IOHEXOL 55 ML: 350 INJECTION, SOLUTION INTRAVENOUS at 17:00

## 2020-01-11 RX ADMIN — HEPARIN SODIUM: 1000 INJECTION, SOLUTION INTRAVENOUS; SUBCUTANEOUS at 16:18

## 2020-01-11 RX ADMIN — NITROGLYCERIN 10 ML: 20 INJECTION INTRAVENOUS at 16:17

## 2020-01-11 RX ADMIN — LIDOCAINE HYDROCHLORIDE: 20 INJECTION, SOLUTION INFILTRATION; PERINEURAL at 16:17

## 2020-01-11 RX ADMIN — SPIRONOLACTONE 25 MG: 25 TABLET ORAL at 08:49

## 2020-01-11 RX ADMIN — FUROSEMIDE 20 MG: 10 INJECTION, SOLUTION INTRAMUSCULAR; INTRAVENOUS at 18:22

## 2020-01-11 RX ADMIN — CARVEDILOL 3.12 MG: 3.12 TABLET, FILM COATED ORAL at 18:20

## 2020-01-11 RX ADMIN — ATORVASTATIN CALCIUM 80 MG: 80 TABLET, FILM COATED ORAL at 18:22

## 2020-01-11 RX ADMIN — NITROGLYCERIN 50 MCG/MIN: 20 INJECTION INTRAVENOUS at 05:23

## 2020-01-11 RX ADMIN — POTASSIUM CHLORIDE 20 MEQ: 1500 TABLET, EXTENDED RELEASE ORAL at 08:49

## 2020-01-11 RX ADMIN — FUROSEMIDE 20 MG: 10 INJECTION, SOLUTION INTRAMUSCULAR; INTRAVENOUS at 05:25

## 2020-01-11 RX ADMIN — POTASSIUM CHLORIDE 20 MEQ: 1500 TABLET, EXTENDED RELEASE ORAL at 18:22

## 2020-01-11 RX ADMIN — TAMSULOSIN HYDROCHLORIDE 0.4 MG: 0.4 CAPSULE ORAL at 11:10

## 2020-01-11 RX ADMIN — POTASSIUM CHLORIDE AND SODIUM CHLORIDE 1000 ML: 900; 150 INJECTION, SOLUTION INTRAVENOUS at 11:09

## 2020-01-11 RX ADMIN — PRASUGREL 10 MG: 10 TABLET, FILM COATED ORAL at 05:29

## 2020-01-11 RX ADMIN — ASPIRIN 81 MG: 81 TABLET, COATED ORAL at 05:27

## 2020-01-11 RX ADMIN — CARVEDILOL 3.12 MG: 3.12 TABLET, FILM COATED ORAL at 08:49

## 2020-01-11 RX ADMIN — INSULIN HUMAN 1 UNITS: 100 INJECTION, SOLUTION PARENTERAL at 20:42

## 2020-01-11 RX ADMIN — FENTANYL CITRATE 50 MCG: 0.05 INJECTION, SOLUTION INTRAMUSCULAR; INTRAVENOUS at 17:00

## 2020-01-11 RX ADMIN — NITROGLYCERIN 1 INCH: 20 OINTMENT TOPICAL at 23:45

## 2020-01-11 RX ADMIN — CAPTOPRIL 12.5 MG: 12.5 TABLET ORAL at 05:26

## 2020-01-11 RX ADMIN — MIDAZOLAM HYDROCHLORIDE 1 MG: 1 INJECTION, SOLUTION INTRAMUSCULAR; INTRAVENOUS at 16:59

## 2020-01-11 RX ADMIN — BIVALIRUDIN 250 MG: 250 INJECTION INTRACAVERNOUS at 16:56

## 2020-01-11 RX ADMIN — INSULIN HUMAN 1 UNITS: 100 INJECTION, SOLUTION PARENTERAL at 11:17

## 2020-01-11 RX ADMIN — LABETALOL HYDROCHLORIDE 10 MG: 5 INJECTION INTRAVENOUS at 14:00

## 2020-01-11 RX ADMIN — SENNOSIDES AND DOCUSATE SODIUM 2 TABLET: 8.6; 5 TABLET ORAL at 18:36

## 2020-01-11 RX ADMIN — HEPARIN SODIUM 2000 UNITS: 200 INJECTION, SOLUTION INTRAVENOUS at 16:18

## 2020-01-11 RX ADMIN — NITROGLYCERIN 1 INCH: 20 OINTMENT TOPICAL at 18:22

## 2020-01-11 ASSESSMENT — ENCOUNTER SYMPTOMS
GASTROINTESTINAL NEGATIVE: 1
CONSTITUTIONAL NEGATIVE: 1
NERVOUS/ANXIOUS: 1
SHORTNESS OF BREATH: 0
COUGH: 1
ABDOMINAL DISTENTION: 0
ABDOMINAL PAIN: 0
RESPIRATORY NEGATIVE: 1
BRUISES/BLEEDS EASILY: 0
EYES NEGATIVE: 1
AGITATION: 0
MUSCULOSKELETAL NEGATIVE: 1
NEUROLOGICAL NEGATIVE: 1
CARDIOVASCULAR NEGATIVE: 1

## 2020-01-11 NOTE — PROGRESS NOTES
Pt complains of pressure in bladder and inability to urine. Bladder scan showed 624 mL. Dr. Amber gerardo. New orders for one time straight catheter followed by bladder scan in 6 hours.  800 mL out with straight catheter.

## 2020-01-11 NOTE — PROGRESS NOTES
[facility] Medicine Daily Progress Note    Date of Service  1/11/2020    Chief Complaint  59 y.o. male admitted 1/10/2020 with acute chest pain, NSTEMI admitted after he was taken to the Cath Lab where the patient was found to have an occlusion of his mid circumflex, the patient was successfully stented, post intervention the patient returned with some significant chest pain, medically treated, reviewed the patient had a lesion distal to the stented segment with a high-grade lesion and very distal thrombus.  The patient found with reduction of ejection fraction to 40%.    Hospital Course    Admitted with NSTEMI, status post stenting to the mid circumflex      Interval Problem Update  Patient seen and examined today.    Patient tolerating treatment and therapies.  All Data, Medication data reviewed.  Case discussed with nursing as available.  Plan of Care reviewed with patient and notified of changes.  1/11 the patient feels better today, no further severe chest pain, still has some chest discomfort, dark urine noted, no nausea vomiting, renal insufficiency, sinus rhythm, mild ectopy, pulse in the 82 range, blood pressure normal range.  Mild leukocytosis, mild anemia, potassium 3.3, being replaced, hematuria noted.    Consultants/Specialty  Pulmonary critical care  Audiology    Code Status  Code    Disposition  Cardiac unit    Review of Systems  Review of Systems   Constitutional: Positive for malaise/fatigue.   HENT: Negative.    Eyes: Negative.    Respiratory: Positive for cough. Negative for shortness of breath.    Cardiovascular: Positive for chest pain.   Gastrointestinal: Negative.    Genitourinary: Negative.    Musculoskeletal: Negative.    Skin: Negative.    Neurological: Negative.    Endo/Heme/Allergies: Negative.    Psychiatric/Behavioral: The patient is nervous/anxious.    All other systems reviewed and are negative.       Physical Exam  Temp:  [36 °C (96.8 °F)-36.1 °C (97 °F)] 36.1 °C (97 °F)  Pulse:   [67-82] 79  Resp:  [11-31] 11  BP: (108-147)/() 117/74  SpO2:  [90 %-98 %] 94 %    Physical Exam  Vitals signs and nursing note reviewed.   Constitutional:       Appearance: He is well-developed.   HENT:      Head: Normocephalic.   Eyes:      Pupils: Pupils are equal, round, and reactive to light.   Neck:      Musculoskeletal: Normal range of motion.   Cardiovascular:      Rate and Rhythm: Normal rate and regular rhythm.      Heart sounds: Normal heart sounds.   Pulmonary:      Effort: Pulmonary effort is normal.   Abdominal:      General: Bowel sounds are normal.      Palpations: Abdomen is soft.   Genitourinary:     Penis: Normal.       Rectum: Normal.   Musculoskeletal: Normal range of motion.   Skin:     General: Skin is warm.   Neurological:      Mental Status: He is alert and oriented to person, place, and time.         Fluids    Intake/Output Summary (Last 24 hours) at 1/11/2020 1334  Last data filed at 1/11/2020 0800  Gross per 24 hour   Intake 977.35 ml   Output 1975 ml   Net -997.65 ml       Laboratory  Recent Labs     01/10/20  0715 01/10/20  1320 01/11/20  0350   WBC 14.3* 14.1* 12.9*   RBC 4.98 4.53* 4.06*   HEMOGLOBIN 15.7 14.4 13.0*   HEMATOCRIT 45.7 41.8* 37.7*   MCV 91.8 92.3 92.9   MCH 31.5 31.8 32.0   MCHC 34.4 34.4 34.5   RDW 41.0 41.1 41.9   PLATELETCT 184 181 168   MPV 11.9 12.2 12.3     Recent Labs     01/10/20  0715 01/10/20  1320 01/11/20  0130   SODIUM 134* 134* 133*   POTASSIUM 3.5* 3.3* 3.3*   CHLORIDE 99 100 100   CO2 22 21 23   GLUCOSE 152* 162* 174*   BUN 23* 22 25*   CREATININE 1.77* 1.56* 1.85*   CALCIUM 9.4 8.7 8.2*             Recent Labs     01/11/20  0130   TRIGLYCERIDE 76   HDL 55   LDL 66       Imaging  US-RENAL   Final Result         1.  Bilateral nephrolithiasis, no hydronephrosis or obstructive changes are appreciated.   2.  Left upper pole renal cyst.      DX-CHEST-PORTABLE (1 VIEW)   Final Result      No acute cardiopulmonary abnormality identified.       EC-ECHOCARDIOGRAM COMPLETE W/O CONT   Final Result      CL-LEFT HEART CATHETERIZATION WITH POSSIBLE INTERVENTION   Final Result      DX-CHEST-PORTABLE (1 VIEW)   Final Result         1.  No acute cardiopulmonary disease.   2.  Cardiomegaly           Assessment/Plan  NSTEMI (non-ST elevated myocardial infarction) (HCC)- (present on admission)  Assessment & Plan  Found to have a occlusion of the circumflex, status post drug-eluting stent placement  Maximize medical therapy  High intensity statin  Afterload reduction  Beta-blockade    Hypertensive urgency- (present on admission)  Assessment & Plan  Patient initiated on carvedilol, captopril  Titrate as indicated  Currently improved    CRISELDA (acute kidney injury) ([facility])- (present on admission)  Assessment & Plan  Likely perfusion, prerenal  Renal ultrasound with kidney stones, history of such, no obstruction  Gentle IV hydration  Monitor renal function / Avoid nephrotoxins and dose medications renally    Hypokalemia- (present on admission)  Assessment & Plan  Replaced, monitor    Hyponatremia- (present on admission)  Assessment & Plan  Mild, monitor, hydration    Transaminitis- (present on admission)  Assessment & Plan  Question of passive congestion, versus other, monitor    Leukemoid reaction- (present on admission)  Assessment & Plan  No evidence of acute infection, monitor     Plan  Continue with medical therapy,  Gentle hydration  Avoid nephrotoxins  Telemonitoring  Medically complex high risk  Cardiology following  See orders    VTE prophylaxis: Dual antiplatelet therapy    I have performed a physical exam and reviewed and updated ROS and Plan today . In review of yesterday's note , there are no changes except as documented above.

## 2020-01-11 NOTE — PROGRESS NOTES
12 hour Chart Check    Monitor Summary: SR 70-80 With PVC's  .14/.08/.48    2 RN Skin Check Completed

## 2020-01-11 NOTE — PROGRESS NOTES
"Frequent episodes of SOB reported by patient.  States chest is \"sore\" from intubation but states that he is not certain if it is purely from prior intubation, or from cardiac cause.  STAT EKG ordered.  "

## 2020-01-11 NOTE — PROGRESS NOTES
Cardiology Follow Up Progress Note    Date of Service  1/11/2020    Attending Physician  Jhonny Blum M.D.    Chief Complaint   Chest pain    HPI  Conner Fox is a 59 y.o. male admitted yesterday morning with chest pain and elevated troponin.  He was taken to the Cath Lab where he was found to have occlusion of his mid circumflex.  This was successfully stented with a 3.0 x 3 mm drug-eluting stent.  The images were reviewed.  Distal to the stented segment was a high-grade lesion in the very distal circumflex and evidence of very distal thrombus.    Post stenting yesterday, the patient developed recurrent chest pressure and transient EKG changes.  This was treated medically.  When I evaluated him at about 7:00 yesterday evening, his pain had abated and his STs have returned to normal.    Echo shows extensive posterior lateral MI with EF in the 40% range.    This morning, he is resting comfortably no VT overnight.  No tachypnea and no further chest pain.      Review of Systems  Review of Systems   Constitutional: Negative.    Respiratory: Negative.    Cardiovascular: Negative.    Gastrointestinal: Negative for abdominal distention and abdominal pain.   Skin: Negative.    Hematological: Does not bruise/bleed easily.   Psychiatric/Behavioral: Negative for agitation.       Vital signs in last 24 hours  Temp:  [36 °C (96.8 °F)-36.1 °C (97 °F)] 36.1 °C (97 °F)  Pulse:  [67-93] 82  Resp:  [10-31] 18  BP: (108-203)/() 123/78  SpO2:  [90 %-99 %] 92 %    Physical Exam  Physical Exam  Constitutional:       General: He is not in acute distress.     Appearance: He is well-developed. He is not diaphoretic.   HENT:      Head: Atraumatic.   Neck:      Musculoskeletal: Neck supple.      Vascular: No JVD.   Cardiovascular:      Rate and Rhythm: Normal rate and regular rhythm.      Heart sounds: Murmur present. Systolic murmur present with a grade of 1/6. Gallop present. S4 sounds present.    Pulmonary:       Effort: Pulmonary effort is normal. No respiratory distress.      Breath sounds: Normal breath sounds. No wheezing or rales.   Abdominal:      Palpations: Abdomen is soft.      Tenderness: There is no tenderness.   Skin:     General: Skin is warm and dry.   Neurological:      Mental Status: He is alert and oriented to person, place, and time.         Lab Review  Lab Results   Component Value Date/Time    WBC 12.9 (H) 01/11/2020 03:50 AM    RBC 4.06 (L) 01/11/2020 03:50 AM    HEMOGLOBIN 13.0 (L) 01/11/2020 03:50 AM    HEMATOCRIT 37.7 (L) 01/11/2020 03:50 AM    MCV 92.9 01/11/2020 03:50 AM    MCH 32.0 01/11/2020 03:50 AM    MCHC 34.5 01/11/2020 03:50 AM    MPV 12.3 01/11/2020 03:50 AM      Lab Results   Component Value Date/Time    SODIUM 133 (L) 01/11/2020 01:30 AM    POTASSIUM 3.3 (L) 01/11/2020 01:30 AM    CHLORIDE 100 01/11/2020 01:30 AM    CO2 23 01/11/2020 01:30 AM    GLUCOSE 174 (H) 01/11/2020 01:30 AM    BUN 25 (H) 01/11/2020 01:30 AM    CREATININE 1.85 (H) 01/11/2020 01:30 AM      Lab Results   Component Value Date/Time    ASTSGOT 89 (H) 01/11/2020 01:30 AM    ALTSGPT 30 01/11/2020 01:30 AM     Lab Results   Component Value Date/Time    CHOLSTRLTOT 136 01/11/2020 01:30 AM    LDL 66 01/11/2020 01:30 AM    HDL 55 01/11/2020 01:30 AM    TRIGLYCERIDE 76 01/11/2020 01:30 AM    TROPONINT 3486 (H) 01/11/2020 01:30 AM       Recent Labs     01/10/20  0715   NTPROBNP 3207*       Non-STEMI MI: Patient mid yesterday morning with a non-STEMI.  Circumflex was totally occluded and stented with good result although he had significant distal disease.  He also has significant disease in his mid LAD and ramus.  No history of diabetes or smoking.  He has significant discomfort post intervention yesterday.  This has resolved.  No VT overnight.    Systolic heart failure, acute.  Echo results as above EF 40% we will change him from captopril to long-acting ACE inhibitor.  Start him on spironolactone.  Continue carvedilol.  There is  no evidence of volume overload on exam today.    CKD 3: Baseline creatinine 1.77 on admission.  Today's creatinine 1.85.  GFR is 37.  Will hold diuretic.  There is no evidence of volume overload on exam.  Repeat kidney function.  The decrement in kidney function is likely due in part to contrast exposure.  Baseline creatinine was abnormal at 1.77.    Hypokalemia: Diuretic has been stopped potassium replaced today.  We will recheck potassium tomorrow    Can continue to monitor in CCU overnight because of his instability yesterday and acute kidney injury.    Reynaldo Cole M.D.   Cardiologist, [facility]  [phone]

## 2020-01-11 NOTE — CARE PLAN
Problem: Venous Thromboembolism (VTW)/Deep Vein Thrombosis (DVT) Prevention:  Goal: Patient will participate in Venous Thrombosis (VTE)/Deep Vein Thrombosis (DVT)Prevention Measures  Outcome: PROGRESSING AS EXPECTED  Intervention: Assess and monitor for anticoagulation complications  Note:   S/S bleeding assessed r/t administration of Angiomax  Intervention: Ensure patient wears graduated elastic stockings (FRANNY hose) and/or SCDs, if ordered, when in bed or chair (Remove at least once per shift for skin check)  Flowsheets (Taken 1/10/2020 2000)  Mechanical Prophylaxis : SCDs, Sequential Compression Device  SCDs, Sequential Compression Device: On  Intervention: Encourage patient to perform ankle flex, foot rotation, and knee flex exercises in addition to other prophylatic measures every hour while awake  Note:   Pt moving spontaneously in bed with no problems  Intervention: Encourage ambulation/mobilization at level directed by Physical Therapy in collaboration with Interdisciplinary Team  Note:   Pt EOB today     Problem: Fluid Volume:  Goal: Will maintain balanced intake and output  Outcome: PROGRESSING AS EXPECTED  Intervention: Monitor, educate, and encourage compliance with therapeutic intake of liquids  Note:   Pt drinking water appropriately. No IV fluids other than with ABX. Straight catheterization required to relieve 800 mL urine

## 2020-01-11 NOTE — PROGRESS NOTES
Notified by RN, pt continues to report intermittent chest pain similar to presenting symptom albiet not as severe in spite of IV NTG.  Troponin has risen back up after trended down earlier.  Reviewing his angiography yesterday LCX distal to the stent may have residual stenosis rather than thrombus. I feel that he should undergo repeat cardiac catheterization.  Risks and benefits of the procedure were discussed at length.   The patient understood, accepted the risks and wishes to proceed.

## 2020-01-11 NOTE — ASSESSMENT & PLAN NOTE
With total occlusion of circumflex, status post PCI and GARRY placement 1/10  Dual antiplatelet therapy, medications reviewed  Recurrent chest pain, thought secondary to PCI with some distal thrombus, now resolved with blood pressure control and morphine, follow closely, EKG, cardiology following

## 2020-01-11 NOTE — PROGRESS NOTES
Critical Care Progress Note    Date of admission  1/10/2020    Chief Complaint  59 y.o. male admitted 1/10/2020 with chest pain, NSTEMI, status post stent to circumflex    Hospital Course    59 y.o. male who presented 1/10/2020 with chest pain.  He was found to have an elevated troponin I enzyme taken to the cardiac Cath Lab where he had occlusion of his mid circumflex that was successfully treated with PCI including a 3.0 x 3 mm GARRY placement.  He was brought back to the intensive care unit after stent placement and had ongoing/recurrent chest pain and hypertension.  I initiated nitroglycerin infusion and he received morphine with improvement in symptoms.  Echocardiogram showed ejection fraction of 40% with akinesis of the lateral wall and hypokinesis of the anterior wall.  Right heart pressures were moderately elevated.  Reviewed with cardiology at bedside, initiating captopril as well.      Interval Problem Update  Reviewed last 24 hour events:  He developed recurrent chest pain and elevation in troponin again today  Back on nitroglycerin infusion  Cardiology following, going back to Cath Lab today    Review of Systems  Review of Systems   Unable to perform ROS: Acuity of condition        Vital Signs for last 24 hours   Temp:  [36 °C (96.8 °F)-36.1 °C (97 °F)] 36.1 °C (97 °F)  Pulse:  [68-82] 79  Resp:  [11-23] 11  BP: (108-147)/() 117/74  SpO2:  [90 %-98 %] 94 %    Hemodynamic parameters for last 24 hours       Respiratory Information for the last 24 hours       Physical Exam   Physical Exam  Vitals signs and nursing note reviewed.   HENT:      Mouth/Throat:      Mouth: Mucous membranes are moist.   Eyes:      Pupils: Pupils are equal, round, and reactive to light.   Cardiovascular:      Rate and Rhythm: Regular rhythm.      Heart sounds: No murmur.   Pulmonary:      Effort: No respiratory distress.      Breath sounds: No wheezing.   Abdominal:      General: There is no distension.      Palpations: Abdomen  is soft.   Musculoskeletal:         General: No swelling or deformity.   Skin:     Capillary Refill: Capillary refill takes less than 2 seconds.   Neurological:      General: No focal deficit present.      Mental Status: He is alert.         Medications  Current Facility-Administered Medications   Medication Dose Route Frequency Provider Last Rate Last Dose   • LIDOCAINE HCL 2 % INJ SOLN            • HEPARIN 1000 UNITS/ML OR USE ONLY            • VERAPAMIL HCL 2.5 MG/ML IV SOLN            • HEPARIN (PORCINE) IN NACL 2000-0.9 UNIT/L-% IV SOLN            • NITROGLYCERIN 2 MG IV SOLN            • MIDAZOLAM HCL 2 MG/2ML INJ SOLN            • FENTANYL CITRATE (PF) 0.05 MG/ML INJ SOLN            • [START ON 1/12/2020] lisinopril (PRINIVIL) tablet 5 mg  5 mg Oral Q DAY Reynaldo Cole M.D.       • captopril (CAPOTEN) tablet 12.5 mg  12.5 mg Oral TWICE DAILY Reynaldo Cole M.D.       • spironolactone (ALDACTONE) tablet 25 mg  25 mg Oral Q DAY Reynaldo Cole M.D.   25 mg at 01/11/20 0849   • tamsulosin (FLOMAX) capsule 0.4 mg  0.4 mg Oral AFTER BREAKFAST Jhonny Blum M.D.   0.4 mg at 01/11/20 1110   • atorvastatin (LIPITOR) tablet 80 mg  80 mg Oral Q EVENING Bertram Mcmillan M.D.   80 mg at 01/10/20 1701   • senna-docusate (PERICOLACE or SENOKOT S) 8.6-50 MG per tablet 2 Tab  2 Tab Oral BID Andrew Connors M.D.        And   • polyethylene glycol/lytes (MIRALAX) PACKET 1 Packet  1 Packet Oral QDAY PRN Andrew Connors M.D.        And   • magnesium hydroxide (MILK OF MAGNESIA) suspension 30 mL  30 mL Oral QDAY PRN Andrew Connors M.D.        And   • bisacodyl (DULCOLAX) suppository 10 mg  10 mg Rectal QDAY PRN Andrew Connors M.D.       • Respiratory Care per Protocol   Nebulization Continuous RT Andrew Connors M.D.       • acetaminophen (TYLENOL) tablet 650 mg  650 mg Oral Q6HRS PRN Andrew Connors M.D.       • enalaprilat (VASOTEC) injection 1.25 mg  1.25 mg Intravenous Q6HRS PRN Andrew Connors M.D.       • labetalol  (NORMODYNE/TRANDATE) injection 10 mg  10 mg Intravenous Q4HRS PRN Andrew Connors M.D.   10 mg at 01/11/20 1400   • ondansetron (ZOFRAN) syringe/vial injection 4 mg  4 mg Intravenous Q4HRS PRN Andrew Connors M.D.   4 mg at 01/10/20 1635   • ondansetron (ZOFRAN ODT) dispertab 4 mg  4 mg Oral Q4HRS PRN Andrew Connors M.D.       • promethazine (PHENERGAN) tablet 12.5-25 mg  12.5-25 mg Oral Q4HRS PRN Andrew Connors M.D.       • promethazine (PHENERGAN) suppository 12.5-25 mg  12.5-25 mg Rectal Q4HRS PRN Andrew Connors M.D.       • prochlorperazine (COMPAZINE) injection 5-10 mg  5-10 mg Intravenous Q4HRS PRN Andrew Connors M.D.       • insulin regular (HUMULIN R) injection 1-6 Units  1-6 Units Subcutaneous 4X/DAY ACHOMEGA Connors M.D.   1 Units at 01/11/20 1117    And   • glucose 4 g chewable tablet 16 g  16 g Oral Q15 MIN PRN Andrew Connors M.D.        And   • DEXTROSE 10% BOLUS 250 mL  250 mL Intravenous Q15 MIN PRN Andrew Connors M.D.       • cyclobenzaprine (FLEXERIL) tablet 10 mg  10 mg Oral TID PRN Andrew Connors M.D.       • carvedilol (COREG) tablet 3.125 mg  3.125 mg Oral BID WITH MEALS Andrew Connors M.D.   3.125 mg at 01/11/20 0849   • aspirin EC (ECOTRIN) tablet 81 mg  81 mg Oral DAILY Jamaal Beck M.D.   81 mg at 01/11/20 0527   • prasugrel (EFFIENT) tablet 10 mg  10 mg Oral DAILY Jamaal Beck M.D.   10 mg at 01/11/20 0529   • morphine (pf) 4 MG/ML injection 2 mg  2 mg Intravenous Q30 MIN PRN Jae Garcia M.D.   2 mg at 01/10/20 1406       Fluids    Intake/Output Summary (Last 24 hours) at 1/11/2020 1556  Last data filed at 1/11/2020 0800  Gross per 24 hour   Intake 972.15 ml   Output 1975 ml   Net -1002.85 ml       Laboratory          Recent Labs     01/10/20  0715 01/10/20  1320 01/11/20  0130   SODIUM 134* 134* 133*   POTASSIUM 3.5* 3.3* 3.3*   CHLORIDE 99 100 100   CO2 22 21 23   BUN 23* 22 25*   CREATININE 1.77* 1.56* 1.85*   MAGNESIUM  --  1.7  --    CALCIUM 9.4 8.7 8.2*     Recent Labs     01/10/20  0715  01/10/20  1320 01/11/20  0130   ALTSGPT 46 39 30   ASTSGOT 152* 134* 89*   ALKPHOSPHAT 64 58 51   TBILIRUBIN 1.4 1.3 1.1   GLUCOSE 152* 162* 174*     Recent Labs     01/10/20  0715 01/10/20  1320 01/11/20  0130 01/11/20  0350   WBC 14.3* 14.1*  --  12.9*   NEUTSPOLYS 76.60* 82.70*  --  79.30*   LYMPHOCYTES 11.50* 6.50*  --  8.30*   MONOCYTES 10.90 10.20  --  11.70   EOSINOPHILS 0.10 0.00  --  0.10   BASOPHILS 0.30 0.10  --  0.10   ASTSGOT 152* 134* 89*  --    ALTSGPT 46 39 30  --    ALKPHOSPHAT 64 58 51  --    TBILIRUBIN 1.4 1.3 1.1  --      Recent Labs     01/10/20  0715 01/10/20  1320 01/11/20  0350   RBC 4.98 4.53* 4.06*   HEMOGLOBIN 15.7 14.4 13.0*   HEMATOCRIT 45.7 41.8* 37.7*   PLATELETCT 184 181 168       Imaging  X-Ray:  I have personally reviewed the images and compared with prior images.    Assessment/Plan  NSTEMI (non-ST elevated myocardial infarction) (HCC)- (present on admission)  Assessment & Plan  With total occlusion of circumflex, status post PCI and GARRY placement 1/10  Dual antiplatelet therapy, medications reviewed  Recurrent chest pain, thought secondary to PCI with some distal thrombus, now resolved with blood pressure control and morphine, follow closely, EKG, cardiology following    Hypertensive urgency- (present on admission)  Assessment & Plan  Nitroglycerin initiated, titrate as needed, oral agents reviewed    CRISELDA (acute kidney injury) (HCC)- (present on admission)  Assessment & Plan  Hold diuretic, urinalysis, follow renal function and avoid nephrotoxic agents    Hypokalemia- (present on admission)  Assessment & Plan  Follow and replace as needed       Updated plan:  Back on nitroglycerin infusion, blood pressure management, a DAPT and back to Cath Lab today for ongoing chest pain post PCI, stent to circumflex  Follow closely in ICU with increased risk for further deterioration and need for intubation or life support    VTE:  Not Indicated  Ulcer: Not Indicated  Lines: None    I have  performed a physical exam and reviewed and updated ROS and Plan today (1/11/2020). In review of yesterday's note (1/10/2020), there are no changes except as documented above.     Discussed patient condition and risk of morbidity and/or mortality with Hospitalist, RN, RT, Pharmacy and Cardiology  The patient remains critically ill.  Critical care time = 31 minutes in directly providing and coordinating critical care and extensive data review.  No time overlap and excludes procedures.

## 2020-01-12 ENCOUNTER — APPOINTMENT (OUTPATIENT)
Dept: RADIOLOGY | Facility: MEDICAL CENTER | Age: 60
DRG: 246 | End: 2020-01-12
Attending: INTERNAL MEDICINE
Payer: COMMERCIAL

## 2020-01-12 PROBLEM — R41.0 DELIRIUM: Status: ACTIVE | Noted: 2020-01-12

## 2020-01-12 LAB
ANION GAP SERPL CALC-SCNC: 11 MMOL/L (ref 0–11.9)
ANION GAP SERPL CALC-SCNC: 12 MMOL/L (ref 0–11.9)
BUN SERPL-MCNC: 35 MG/DL (ref 8–22)
BUN SERPL-MCNC: 39 MG/DL (ref 8–22)
CALCIUM SERPL-MCNC: 8.9 MG/DL (ref 8.5–10.5)
CALCIUM SERPL-MCNC: 9 MG/DL (ref 8.5–10.5)
CHLORIDE SERPL-SCNC: 101 MMOL/L (ref 96–112)
CHLORIDE SERPL-SCNC: 102 MMOL/L (ref 96–112)
CO2 SERPL-SCNC: 22 MMOL/L (ref 20–33)
CO2 SERPL-SCNC: 23 MMOL/L (ref 20–33)
CREAT SERPL-MCNC: 1.8 MG/DL (ref 0.5–1.4)
CREAT SERPL-MCNC: 1.86 MG/DL (ref 0.5–1.4)
EKG IMPRESSION: NORMAL
ERYTHROCYTE [DISTWIDTH] IN BLOOD BY AUTOMATED COUNT: 42 FL (ref 35.9–50)
GLUCOSE BLD-MCNC: 119 MG/DL (ref 65–99)
GLUCOSE BLD-MCNC: 122 MG/DL (ref 65–99)
GLUCOSE BLD-MCNC: 153 MG/DL (ref 65–99)
GLUCOSE BLD-MCNC: 182 MG/DL (ref 65–99)
GLUCOSE BLD-MCNC: 193 MG/DL (ref 65–99)
GLUCOSE BLD-MCNC: 223 MG/DL (ref 65–99)
GLUCOSE SERPL-MCNC: 132 MG/DL (ref 65–99)
GLUCOSE SERPL-MCNC: 143 MG/DL (ref 65–99)
HCT VFR BLD AUTO: 38.5 % (ref 42–52)
HGB BLD-MCNC: 12.7 G/DL (ref 14–18)
MAGNESIUM SERPL-MCNC: 2 MG/DL (ref 1.5–2.5)
MCH RBC QN AUTO: 31.1 PG (ref 27–33)
MCHC RBC AUTO-ENTMCNC: 33 G/DL (ref 33.7–35.3)
MCV RBC AUTO: 94.4 FL (ref 81.4–97.8)
PHOSPHATE SERPL-MCNC: 3.5 MG/DL (ref 2.5–4.5)
PLATELET # BLD AUTO: 198 K/UL (ref 164–446)
PMV BLD AUTO: 11.7 FL (ref 9–12.9)
POTASSIUM SERPL-SCNC: 3.4 MMOL/L (ref 3.6–5.5)
POTASSIUM SERPL-SCNC: 3.7 MMOL/L (ref 3.6–5.5)
RBC # BLD AUTO: 4.08 M/UL (ref 4.7–6.1)
SODIUM SERPL-SCNC: 135 MMOL/L (ref 135–145)
SODIUM SERPL-SCNC: 136 MMOL/L (ref 135–145)
TROPONIN T SERPL-MCNC: 3235 NG/L (ref 6–19)
TROPONIN T SERPL-MCNC: 3914 NG/L (ref 6–19)
WBC # BLD AUTO: 11.6 K/UL (ref 4.8–10.8)

## 2020-01-12 PROCEDURE — 99233 SBSQ HOSP IP/OBS HIGH 50: CPT | Performed by: HOSPITALIST

## 2020-01-12 PROCEDURE — 82962 GLUCOSE BLOOD TEST: CPT

## 2020-01-12 PROCEDURE — 93010 ELECTROCARDIOGRAM REPORT: CPT | Performed by: INTERNAL MEDICINE

## 2020-01-12 PROCEDURE — 80048 BASIC METABOLIC PNL TOTAL CA: CPT

## 2020-01-12 PROCEDURE — 71045 X-RAY EXAM CHEST 1 VIEW: CPT

## 2020-01-12 PROCEDURE — 700102 HCHG RX REV CODE 250 W/ 637 OVERRIDE(OP): Performed by: INTERNAL MEDICINE

## 2020-01-12 PROCEDURE — 93005 ELECTROCARDIOGRAM TRACING: CPT | Performed by: INTERNAL MEDICINE

## 2020-01-12 PROCEDURE — 700102 HCHG RX REV CODE 250 W/ 637 OVERRIDE(OP): Performed by: HOSPITALIST

## 2020-01-12 PROCEDURE — 700105 HCHG RX REV CODE 258

## 2020-01-12 PROCEDURE — 84100 ASSAY OF PHOSPHORUS: CPT

## 2020-01-12 PROCEDURE — A9270 NON-COVERED ITEM OR SERVICE: HCPCS | Performed by: INTERNAL MEDICINE

## 2020-01-12 PROCEDURE — 770022 HCHG ROOM/CARE - ICU (200)

## 2020-01-12 PROCEDURE — 700111 HCHG RX REV CODE 636 W/ 250 OVERRIDE (IP): Performed by: INTERNAL MEDICINE

## 2020-01-12 PROCEDURE — 99233 SBSQ HOSP IP/OBS HIGH 50: CPT | Performed by: INTERNAL MEDICINE

## 2020-01-12 PROCEDURE — 83735 ASSAY OF MAGNESIUM: CPT

## 2020-01-12 PROCEDURE — 84484 ASSAY OF TROPONIN QUANT: CPT

## 2020-01-12 PROCEDURE — 85027 COMPLETE CBC AUTOMATED: CPT

## 2020-01-12 PROCEDURE — A9270 NON-COVERED ITEM OR SERVICE: HCPCS | Performed by: HOSPITALIST

## 2020-01-12 RX ORDER — FUROSEMIDE 10 MG/ML
20 INJECTION INTRAMUSCULAR; INTRAVENOUS
Status: DISCONTINUED | OUTPATIENT
Start: 2020-01-12 | End: 2020-01-13

## 2020-01-12 RX ORDER — SODIUM CHLORIDE, SODIUM LACTATE, POTASSIUM CHLORIDE, AND CALCIUM CHLORIDE .6; .31; .03; .02 G/100ML; G/100ML; G/100ML; G/100ML
1000 INJECTION, SOLUTION INTRAVENOUS ONCE
Status: DISCONTINUED | OUTPATIENT
Start: 2020-01-12 | End: 2020-01-12

## 2020-01-12 RX ORDER — LISINOPRIL 10 MG/1
10 TABLET ORAL
Status: DISCONTINUED | OUTPATIENT
Start: 2020-01-13 | End: 2020-01-13

## 2020-01-12 RX ORDER — POTASSIUM CHLORIDE 20 MEQ/1
20 TABLET, EXTENDED RELEASE ORAL 3 TIMES DAILY
Status: DISPENSED | OUTPATIENT
Start: 2020-01-12 | End: 2020-01-14

## 2020-01-12 RX ORDER — POTASSIUM CHLORIDE 7.45 MG/ML
10 INJECTION INTRAVENOUS
Status: COMPLETED | OUTPATIENT
Start: 2020-01-12 | End: 2020-01-12

## 2020-01-12 RX ORDER — FUROSEMIDE 10 MG/ML
INJECTION INTRAMUSCULAR; INTRAVENOUS
Status: ACTIVE
Start: 2020-01-12 | End: 2020-01-13

## 2020-01-12 RX ORDER — LISINOPRIL 5 MG/1
5 TABLET ORAL ONCE
Status: COMPLETED | OUTPATIENT
Start: 2020-01-12 | End: 2020-01-12

## 2020-01-12 RX ORDER — SODIUM CHLORIDE 9 MG/ML
INJECTION, SOLUTION INTRAVENOUS
Status: COMPLETED
Start: 2020-01-12 | End: 2020-01-12

## 2020-01-12 RX ORDER — CARVEDILOL 6.25 MG/1
6.25 TABLET ORAL 2 TIMES DAILY WITH MEALS
Status: DISCONTINUED | OUTPATIENT
Start: 2020-01-12 | End: 2020-01-13

## 2020-01-12 RX ADMIN — ATORVASTATIN CALCIUM 80 MG: 80 TABLET, FILM COATED ORAL at 17:24

## 2020-01-12 RX ADMIN — LABETALOL HYDROCHLORIDE 10 MG: 5 INJECTION INTRAVENOUS at 06:14

## 2020-01-12 RX ADMIN — SPIRONOLACTONE 25 MG: 25 TABLET ORAL at 05:01

## 2020-01-12 RX ADMIN — SODIUM CHLORIDE: 9 INJECTION, SOLUTION INTRAVENOUS at 13:00

## 2020-01-12 RX ADMIN — POLYETHYLENE GLYCOL 3350 1 PACKET: 17 POWDER, FOR SOLUTION ORAL at 17:25

## 2020-01-12 RX ADMIN — INSULIN HUMAN 2 UNITS: 100 INJECTION, SOLUTION PARENTERAL at 20:14

## 2020-01-12 RX ADMIN — CARVEDILOL 6.25 MG: 6.25 TABLET, FILM COATED ORAL at 17:24

## 2020-01-12 RX ADMIN — INSULIN HUMAN 1 UNITS: 100 INJECTION, SOLUTION PARENTERAL at 12:45

## 2020-01-12 RX ADMIN — FUROSEMIDE 20 MG: 10 INJECTION, SOLUTION INTRAMUSCULAR; INTRAVENOUS at 17:24

## 2020-01-12 RX ADMIN — ASPIRIN 81 MG: 81 TABLET, COATED ORAL at 05:01

## 2020-01-12 RX ADMIN — NITROGLYCERIN 1 INCH: 20 OINTMENT TOPICAL at 05:01

## 2020-01-12 RX ADMIN — POTASSIUM CHLORIDE 10 MEQ: 7.46 INJECTION, SOLUTION INTRAVENOUS at 12:20

## 2020-01-12 RX ADMIN — INSULIN HUMAN 1 UNITS: 100 INJECTION, SOLUTION PARENTERAL at 05:22

## 2020-01-12 RX ADMIN — POTASSIUM CHLORIDE 20 MEQ: 1500 TABLET, EXTENDED RELEASE ORAL at 05:01

## 2020-01-12 RX ADMIN — POTASSIUM CHLORIDE 10 MEQ: 7.46 INJECTION, SOLUTION INTRAVENOUS at 09:15

## 2020-01-12 RX ADMIN — LABETALOL HYDROCHLORIDE 10 MG: 5 INJECTION INTRAVENOUS at 12:22

## 2020-01-12 RX ADMIN — LISINOPRIL 5 MG: 5 TABLET ORAL at 05:02

## 2020-01-12 RX ADMIN — FUROSEMIDE 20 MG: 10 INJECTION, SOLUTION INTRAMUSCULAR; INTRAVENOUS at 08:50

## 2020-01-12 RX ADMIN — POTASSIUM CHLORIDE 20 MEQ: 1500 TABLET, EXTENDED RELEASE ORAL at 17:24

## 2020-01-12 RX ADMIN — POTASSIUM CHLORIDE 10 MEQ: 7.46 INJECTION, SOLUTION INTRAVENOUS at 11:14

## 2020-01-12 RX ADMIN — POTASSIUM CHLORIDE 20 MEQ: 1500 TABLET, EXTENDED RELEASE ORAL at 08:48

## 2020-01-12 RX ADMIN — LISINOPRIL 5 MG: 5 TABLET ORAL at 17:32

## 2020-01-12 RX ADMIN — CARVEDILOL 3.12 MG: 3.12 TABLET, FILM COATED ORAL at 08:48

## 2020-01-12 RX ADMIN — PRASUGREL 10 MG: 10 TABLET, FILM COATED ORAL at 05:01

## 2020-01-12 RX ADMIN — TAMSULOSIN HYDROCHLORIDE 0.4 MG: 0.4 CAPSULE ORAL at 08:47

## 2020-01-12 RX ADMIN — SENNOSIDES AND DOCUSATE SODIUM 2 TABLET: 8.6; 5 TABLET ORAL at 17:24

## 2020-01-12 RX ADMIN — POTASSIUM CHLORIDE 10 MEQ: 7.46 INJECTION, SOLUTION INTRAVENOUS at 09:04

## 2020-01-12 ASSESSMENT — ENCOUNTER SYMPTOMS
SHORTNESS OF BREATH: 0
MUSCULOSKELETAL NEGATIVE: 1
NERVOUS/ANXIOUS: 1
EYES NEGATIVE: 1
COUGH: 1
GASTROINTESTINAL NEGATIVE: 1
NEUROLOGICAL NEGATIVE: 1

## 2020-01-12 ASSESSMENT — LIFESTYLE VARIABLES
HOW MANY TIMES IN THE PAST YEAR HAVE YOU HAD 5 OR MORE DRINKS IN A DAY: 0
TOTAL SCORE: 0
EVER HAD A DRINK FIRST THING IN THE MORNING TO STEADY YOUR NERVES TO GET RID OF A HANGOVER: NO
CONSUMPTION TOTAL: NEGATIVE
TOTAL SCORE: 0
HAVE YOU EVER FELT YOU SHOULD CUT DOWN ON YOUR DRINKING: NO
ALCOHOL_USE: YES
EVER_SMOKED: NEVER
ON A TYPICAL DAY WHEN YOU DRINK ALCOHOL HOW MANY DRINKS DO YOU HAVE: 0
EVER FELT BAD OR GUILTY ABOUT YOUR DRINKING: NO
REASON UNABLE TO ASSESS: NONW
TOTAL SCORE: 0
DOES PATIENT WANT TO STOP DRINKING: NO
HAVE PEOPLE ANNOYED YOU BY CRITICIZING YOUR DRINKING: NO
AVERAGE NUMBER OF DAYS PER WEEK YOU HAVE A DRINK CONTAINING ALCOHOL: 2

## 2020-01-12 ASSESSMENT — PATIENT HEALTH QUESTIONNAIRE - PHQ9
7. TROUBLE CONCENTRATING ON THINGS, SUCH AS READING THE NEWSPAPER OR WATCHING TELEVISION: NOT AT ALL
9. THOUGHTS THAT YOU WOULD BE BETTER OFF DEAD, OR OF HURTING YOURSELF: NOT AT ALL
SUM OF ALL RESPONSES TO PHQ QUESTIONS 1-9: 3
2. FEELING DOWN, DEPRESSED, IRRITABLE, OR HOPELESS: SEVERAL DAYS
8. MOVING OR SPEAKING SO SLOWLY THAT OTHER PEOPLE COULD HAVE NOTICED. OR THE OPPOSITE, BEING SO FIGETY OR RESTLESS THAT YOU HAVE BEEN MOVING AROUND A LOT MORE THAN USUAL: NOT AT ALL
5. POOR APPETITE OR OVEREATING: NOT AT ALL
SUM OF ALL RESPONSES TO PHQ9 QUESTIONS 1 AND 2: 1
4. FEELING TIRED OR HAVING LITTLE ENERGY: SEVERAL DAYS
3. TROUBLE FALLING OR STAYING ASLEEP OR SLEEPING TOO MUCH: SEVERAL DAYS
6. FEELING BAD ABOUT YOURSELF - OR THAT YOU ARE A FAILURE OR HAVE LET YOURSELF OR YOUR FAMILY DOWN: NOT AL ALL
1. LITTLE INTEREST OR PLEASURE IN DOING THINGS: NOT AT ALL

## 2020-01-12 ASSESSMENT — COGNITIVE AND FUNCTIONAL STATUS - GENERAL
PERSONAL GROOMING: A LITTLE
DRESSING REGULAR UPPER BODY CLOTHING: A LITTLE
EATING MEALS: A LITTLE
SUGGESTED CMS G CODE MODIFIER MOBILITY: CH
HELP NEEDED FOR BATHING: A LITTLE
DRESSING REGULAR LOWER BODY CLOTHING: A LITTLE
TOILETING: A LITTLE
DAILY ACTIVITIY SCORE: 18
SUGGESTED CMS G CODE MODIFIER DAILY ACTIVITY: CK
MOBILITY SCORE: 24

## 2020-01-12 NOTE — CARE PLAN
Problem: Safety  Goal: Will remain free from injury  Outcome: PROGRESSING AS EXPECTED  Note:   Educated pt importance on using call light prior to getting up, Bed alarm set, Chair alarm set. Bed locked and in lowest position.

## 2020-01-12 NOTE — PROGRESS NOTES
Pt experiencing SOB and come increased CP, EKG ordered per protocol.  EKG Completed, RN called Cardiology and Dr Weathers was on floor had him take a look at EKG and pt. Recvd new orders

## 2020-01-12 NOTE — PROGRESS NOTES
Received bedside report from Peg LISA,  assumed care of pt. Pt resting comfortably in bed. Bedside table, and call light within reach. Bed alarm on and in lowest position.Pt denying pain at this time. VS stable. Updated whiteboard in room and discussed today's POC.     No questions at this time  Pt's daughter at bedside.

## 2020-01-12 NOTE — PROGRESS NOTES
Pt in obvious pain, Grimacing. Pt stated hurts to urinate. RN assessed Faulkner and pt was urinating around. RN pulled Faulkner and pt was able to urinate into urinal  With ease.

## 2020-01-12 NOTE — PROGRESS NOTES
Upon assessment of pt after bedside report with confusion, pt still shows signs of confusion. Md made aware. Pt also had a run of PSVT, Md notified of this as well. Orders given

## 2020-01-12 NOTE — OP REPORT
Cardiac catheterization report    Procedure date: 1/11/2020    Referring physician: Dr. Reynaldo Cole    Pre-operative Diagnosis:  Status post stenting of the mid left circumflex artery for non-STEMI with recurrent angina    Post-operative Diagnosis:   1. Patent previously placed stent in the mid left circumflex artery with 95% stenosis distal to the stent 2.  Status post stenting of the distal left circumflex artery with 2.5 x 18 mm Abdullahi drug eluting stent  3.  80% mid left anterior descending artery (LAD) stenosis with total occlusion of distal LAD  4.  70% proximal stenosis and 80% mid stenosis of the first diagonal branch of LAD    Procedure:  1.  Selective angiography of left coronary artery  2.  Percutaneous intervention the left circumflex artery  3.  Supervision of moderate conscious sedation    Complications: None    Description of Procedure:  After informed consent was obtained, the patient was brought to cardiac catheterization laboratory in fasting state.   Vlad test was carried out on the right hand and was found to be negative.  Right wrist and right groin were then prepped and drapped in sterile fashion.  Versed and fentanyl were used for conscious sedation.  Lidocaine 2% was used to anesthetize the area.  Attempted to place radial sheath was unsuccessful.   His radial artery ppeared to bifurcate just above the wrist.   Using portable ultrasound, a 6 [place] sheath was placed in the right femoral artery using modified center technique.  A 6 Latvian EBU 3.75 guide were then seated into the left main.  Active angiography left coronary artery was performed in 2 orthogonal views.  The previously placed left circumflex stent was patent but there was severe stenosis distal to the stent with sluggish flow.  The stenosis did not improve after intracoronary nitroglycerin.    Bivalrudin was then started for anticoagulation.  A Whisper wire was then advanced pass the stenosis.  The lesion was dilated with a 2x15  mm balloon.  A 2.5x18 mm Abdullahi drug eluting stent was deployed and post dilated with 2.5x15 mm non-compliance balloon.  Subsequent angiography showed no significant residual stenosis with SHERRI III flow.   The guide wire was subsequently removed and final angiography was performed.  It confirmed good result. The guide catheter was then removed.   Angiography of the right common femoral artery was performed.    Femoral sheath was then removed. Hemostasis was obtained using Angio-Seal.  We noted small hematoma around the right wrist area for a Terumo TR wrist band was applied.  The patient was already taking Prasugrel.  No additional loading was given.  Bivalirudin was subsequently reduced to low dose infusion.  The patient tolerated procedure well and left cardiac catheterization laboratory in stable condition.    I supervised monitoring the patient under moderate conscious sedation beginning at 4:12 PM until the end of the case at 5:04 PM.    Findings:    Prior to the intervention;    Left circumflex artery (LCX) had 95% stenosis in the distal portion beyond a previously placed stent with sluggish distal flow.  Beyond that point, the LCX gives off several small branches couple of which have diffuse.  The previously placed stent widely patent however    The left anterior descending artery has 80% mid stenosis and occluded distally. The relatively large first diagonal branch has 70% proximal stenosis and about 80% stenosis in its midportion    After intervention, there was 0% residual stenosis in stented segment of the distal LCX with SHERRI III flow.      Plans;  Dual antiplatelet therapy for one year.   Continue low-dose bivalirudin infusion for 3 hours.   Risk factor modification.  Bed rest for 6 hours.  Limit right wrist movement for 24 hours.  Add nitrates.  Optimize medical Rx for LV dysfunction.  Closely follow renal function      Lindsay Weathers M.D.

## 2020-01-12 NOTE — OR SURGEON
Immediate Post-Operative Note      PreOp Diagnosis: s/p LCX stent for late presenting NSTEMI with recurrent CP     PostOp Diagnosis:   Patent stent in mid LCX with 95% stenosis distal to the stent and diffuse disease distal LCX,   80-90% mid LAD with total occlusion distal LAD,   80% mid ramus  s/p 2.5x18 mm Abdullahi GARRY to dist LCX no sig residual SHERRI III    Procedure(s) :  Selective left coronary Angiography  PCI LCX  Angioseal    Surgeon(s):  Lindsay Weathers M.D.    Type of Anesthesia: Moderate Sedation    Specimen: None    Contrast Media:  55  cc's    Findings: As above    Complications: none      Lindsay Weathers M.D.  1/11/2020 5:16 PM

## 2020-01-12 NOTE — PROGRESS NOTES
Reason for consultation /admission : late presenting NSTEMI    Underwent repeat cardiac catheterization yesterday for recurrent angina, re-infarct  Previously placed left circumflex stent was patent but there was severe stenosis distal to the stent.  Receive another stent to the circumflex system.  Still has significant residual disease in the LAD and first diagonal branch  Did relatively well overnight.  Denies chest pain.  Denies shortness of breath this morning lying flat.  Had brief SVT earlier but no significant ventricular arrhythmia  Blood pressure was in the 150 received 1 dose of IV labetalol     Review of systems;    General: No fever, chills, no weakness  HENT: + sore throat, no neck pain  Eyes: No blurred vision or double vision  Heart: No palpitation, no PND or orthopnea,  Lung: No productive cough, no hemoptysis  Abdomen: No abdominal pain, no nausea vomiting diarrhea or blood in stool  : No dysuria, no frequency or hesitancy, no hematuria  Musculoskeletal: No groin or arm pain at cath site   Hematology: No easy bruising  Skin: No rash or itching  Neurological: No headache, no new focal weakness or numbness  Psychological: No anxiety or insomnia  All other review of systems are negative      Pulse:  [71-98] 78  Resp:  [16-25] 22  BP: (108-174)/() 129/97  SpO2:  [92 %-99 %] 95 %  GENERAL not in acute distress, not dyspnic at rest  Head atraumatic, normocephalic  Eyes EOMI  ENT neck supple, no JVD, no carotid bruits or thyromegaly  Lung good expansion, distant sound, no rales or wheezing  Heart RRR, normal rate, no murmur, no gallop or rub  Small hematoma rt wrist. No groin hematoma  Abd soft, no tenderness, mass or bruits  Ext no edema  Skin no ecchymosis or petechiae  Musculoskeletal no deformity  Neuro grossly intact  Psych normal mood, normal affect    Monitor reviewed by me showed as above.    Labs: Cr 1.8, K+ 3.4    Assessment and plans    1. NSTEMI with recurrent MI s/p LCX stents x2 doing  relatively well  Mild LV dysfunction on ECHO, he clinically probably has mild heart failure.    We will add low-dose furosemide.  Start lisinopril.    No angina overnight, we will discontinue topical nitrate.  Uptitrate carvedilol to 6.25 twice a day.  With significant renal insufficiency would prefer to delay PCI of LAD till the next 2-3 weeks if continue to improve.      2. CAD,   Residual mid LAD and diagonal stenoses  As above.  Continue, statin      3.  Paroxysmal SVT  As above, we will increase carvedilol    4 Hypokalemia   Will increase potassium replacement    5. CKD, per primary    May transfer to telemetry    Will follow    Please note that this dictation was created using voice recognition software. I have worked with consultants from the vendor as well as technical experts from [employer] to optimize the interface. I have made every reasonable attempt to correct obvious errors, but I expect that there are errors of grammar and possibly content I did not discover before finalizing the note

## 2020-01-12 NOTE — PROGRESS NOTES
12 hour Chart Check    Monitor Summary: SR/ST 60-90's with PVC's With BBB  .20/.10/.40    2 RN Skin Check Completed

## 2020-01-12 NOTE — PROGRESS NOTES
Critical Care Progress Note    Date of admission  1/10/2020    Chief Complaint  59 y.o. male admitted 1/10/2020 with chest pain, NSTEMI, status post stent to circumflex    Hospital Course    59 y.o. male who presented 1/10/2020 with chest pain.  He was found to have an elevated troponin I enzyme taken to the cardiac Cath Lab where he had occlusion of his mid circumflex that was successfully treated with PCI including a 3.0 x 3 mm GARRY placement.  He was brought back to the intensive care unit after stent placement and had ongoing/recurrent chest pain and hypertension.  I initiated nitroglycerin infusion and he received morphine with improvement in symptoms.  Echocardiogram showed ejection fraction of 40% with akinesis of the lateral wall and hypokinesis of the anterior wall.  Right heart pressures were moderately elevated.  Reviewed with cardiology at bedside, initiating captopril as well.      Interval Problem Update  Reviewed last 24 hour events:  Back to cath lab yesterday 2nd stent to circ  Mild int confusion, NFE  2 lpm oxygen; IS 1750; goal 2500  SR, brief PSVT; replacing K  Lasix  Afebrile  EF 40%  I/O = 435/2.0  DAPT, statin    Review of Systems  Review of Systems   Unable to perform ROS: Acuity of condition        Vital Signs for last 24 hours   Pulse:  [71-98] 71  Resp:  [11-24] 20  BP: (108-174)/() 155/104  SpO2:  [92 %-99 %] 96 %    Hemodynamic parameters for last 24 hours       Respiratory Information for the last 24 hours       Physical Exam   Physical Exam  Vitals signs and nursing note reviewed.   HENT:      Mouth/Throat:      Mouth: Mucous membranes are moist.   Eyes:      Pupils: Pupils are equal, round, and reactive to light.   Cardiovascular:      Rate and Rhythm: Regular rhythm.      Heart sounds: No murmur.   Pulmonary:      Effort: No respiratory distress.      Breath sounds: No wheezing.   Abdominal:      General: There is no distension.      Palpations: Abdomen is soft.    Musculoskeletal:         General: No swelling or deformity.   Skin:     Capillary Refill: Capillary refill takes less than 2 seconds.   Neurological:      General: No focal deficit present.      Mental Status: He is alert.         Medications  Current Facility-Administered Medications   Medication Dose Route Frequency Provider Last Rate Last Dose   • furosemide (LASIX) injection 20 mg  20 mg Intravenous BID DIURETIC Lindsay Weathers M.D.       • potassium chloride SA (Kdur) tablet 20 mEq  20 mEq Oral TID Lindsay Weathers M.D.       • [START ON 1/13/2020] lisinopril (PRINIVIL) 10 MG tablet 10 mg  10 mg Oral Q DAY Lindsay Weathers M.D.       • potassium chloride (KCL) ivpb 10 mEq  10 mEq Intravenous Q HOUR Jae Garcia M.D.       • spironolactone (ALDACTONE) tablet 25 mg  25 mg Oral Q DAY Reynaldo Cole M.D.   25 mg at 01/12/20 0501   • tamsulosin (FLOMAX) capsule 0.4 mg  0.4 mg Oral AFTER BREAKFAST Jhonny Blum M.D.   0.4 mg at 01/11/20 1110   • nitroglycerin (NITRO-BID) 2 % ointment 1 Inch  1 Inch Topical Q6HRS Lindsay Weathers M.D.   1 Inch at 01/12/20 0501   • atorvastatin (LIPITOR) tablet 80 mg  80 mg Oral Q EVENING Bertram Mcmillan M.D.   80 mg at 01/11/20 1822   • senna-docusate (PERICOLACE or SENOKOT S) 8.6-50 MG per tablet 2 Tab  2 Tab Oral BID Andrew Connors M.D.   2 Tab at 01/11/20 1836    And   • polyethylene glycol/lytes (MIRALAX) PACKET 1 Packet  1 Packet Oral QDAY PRN Andrew Connors M.D.        And   • magnesium hydroxide (MILK OF MAGNESIA) suspension 30 mL  30 mL Oral QDAY PRN Andrew Connors M.D.        And   • bisacodyl (DULCOLAX) suppository 10 mg  10 mg Rectal QDAY PRN Andrew Connors M.D.       • Respiratory Care per Protocol   Nebulization Continuous RT Andrew Connors M.D.       • acetaminophen (TYLENOL) tablet 650 mg  650 mg Oral Q6HRS PRN Andrew Connors M.D.       • enalaprilat (VASOTEC) injection 1.25 mg  1.25 mg Intravenous Q6HRS PRN Andrew Connors M.D.       • labetalol  (NORMODYNE/TRANDATE) injection 10 mg  10 mg Intravenous Q4HRS PRN Andrew Connors M.D.   10 mg at 01/12/20 0614   • ondansetron (ZOFRAN) syringe/vial injection 4 mg  4 mg Intravenous Q4HRS PRN Andrew Connors M.D.   4 mg at 01/10/20 1635   • ondansetron (ZOFRAN ODT) dispertab 4 mg  4 mg Oral Q4HRS PRN Andrew Connors M.D.       • promethazine (PHENERGAN) tablet 12.5-25 mg  12.5-25 mg Oral Q4HRS PRN Andrew Connors M.D.       • promethazine (PHENERGAN) suppository 12.5-25 mg  12.5-25 mg Rectal Q4HRS PRN Andrew Connors M.D.       • prochlorperazine (COMPAZINE) injection 5-10 mg  5-10 mg Intravenous Q4HRS PRN Andrew Connors M.D.       • insulin regular (HUMULIN R) injection 1-6 Units  1-6 Units Subcutaneous 4X/DAY ACHOMEGA Connors M.D.   1 Units at 01/12/20 0522    And   • glucose 4 g chewable tablet 16 g  16 g Oral Q15 MIN PRN Andrew Connors M.D.        And   • DEXTROSE 10% BOLUS 250 mL  250 mL Intravenous Q15 MIN PRN Andrew Connors M.D.       • cyclobenzaprine (FLEXERIL) tablet 10 mg  10 mg Oral TID PRN Andrew Connors M.D.       • carvedilol (COREG) tablet 3.125 mg  3.125 mg Oral BID WITH MEALS Andrew Connors M.D.   Stopped at 01/12/20 0830   • aspirin EC (ECOTRIN) tablet 81 mg  81 mg Oral DAILY Jamaal Beck M.D.   81 mg at 01/12/20 0501   • prasugrel (EFFIENT) tablet 10 mg  10 mg Oral DAILY Jamaal Beck M.D.   10 mg at 01/12/20 0501   • morphine (pf) 4 MG/ML injection 2 mg  2 mg Intravenous Q30 MIN PRN Jae Garcia M.D.   2 mg at 01/10/20 1406       Fluids    Intake/Output Summary (Last 24 hours) at 1/12/2020 0845  Last data filed at 1/12/2020 0600  Gross per 24 hour   Intake 411.8 ml   Output 1700 ml   Net -1288.2 ml       Laboratory          Recent Labs     01/10/20  1320 01/11/20  0130 01/12/20  0350   SODIUM 134* 133* 135   POTASSIUM 3.3* 3.3* 3.4*   CHLORIDE 100 100 102   CO2 21 23 22   BUN 22 25* 35*   CREATININE 1.56* 1.85* 1.80*   MAGNESIUM 1.7  --  2.0   PHOSPHORUS  --   --  3.5   CALCIUM 8.7 8.2* 8.9     Recent  Labs     01/10/20  0715 01/10/20  1320 01/11/20  0130 01/12/20  0350   ALTSGPT 46 39 30  --    ASTSGOT 152* 134* 89*  --    ALKPHOSPHAT 64 58 51  --    TBILIRUBIN 1.4 1.3 1.1  --    GLUCOSE 152* 162* 174* 143*     Recent Labs     01/10/20  0715 01/10/20  1320 01/11/20  0130 01/11/20  0350 01/12/20  0350   WBC 14.3* 14.1*  --  12.9* 11.6*   NEUTSPOLYS 76.60* 82.70*  --  79.30*  --    LYMPHOCYTES 11.50* 6.50*  --  8.30*  --    MONOCYTES 10.90 10.20  --  11.70  --    EOSINOPHILS 0.10 0.00  --  0.10  --    BASOPHILS 0.30 0.10  --  0.10  --    ASTSGOT 152* 134* 89*  --   --    ALTSGPT 46 39 30  --   --    ALKPHOSPHAT 64 58 51  --   --    TBILIRUBIN 1.4 1.3 1.1  --   --      Recent Labs     01/10/20  1320 01/11/20  0350 01/12/20  0350   RBC 4.53* 4.06* 4.08*   HEMOGLOBIN 14.4 13.0* 12.7*   HEMATOCRIT 41.8* 37.7* 38.5*   PLATELETCT 181 168 198       Imaging  X-Ray:  I have personally reviewed the images and compared with prior images.    Assessment/Plan  NSTEMI (non-ST elevated myocardial infarction) (HCC)- (present on admission)  Assessment & Plan  With total occlusion of circumflex, status post PCI and GARRY placement 1/10  Dual antiplatelet therapy, medications reviewed  Recurrent chest pain, thought secondary to PCI with some distal thrombus, now resolved with blood pressure control and morphine, follow closely, EKG, cardiology following    Hypertensive urgency- (present on admission)  Assessment & Plan  Nitroglycerin initiated, titrate as needed, oral agents reviewed    CRISELDA (acute kidney injury) ([facility])- (present on admission)  Assessment & Plan  Hold diuretic, urinalysis, follow renal function and avoid nephrotoxic agents    Hypokalemia- (present on admission)  Assessment & Plan  Follow and replace as needed         VTE:  Not Indicated  Ulcer: Not Indicated  Lines: None    I have performed a physical exam and reviewed and updated ROS and Plan today (1/12/2020). In review of yesterday's note (1/11/2020), there are no  changes except as documented above.     Discussed patient condition and risk of morbidity and/or mortality with Hospitalist, RN, RT, Pharmacy and Cardiology

## 2020-01-13 PROBLEM — I25.110 CORONARY ARTERY DISEASE INVOLVING NATIVE CORONARY ARTERY WITH UNSTABLE ANGINA PECTORIS (HCC): Status: ACTIVE | Noted: 2020-01-13

## 2020-01-13 PROBLEM — I23.7: Status: ACTIVE | Noted: 2020-01-13

## 2020-01-13 LAB
ANION GAP SERPL CALC-SCNC: 14 MMOL/L (ref 0–11.9)
BUN SERPL-MCNC: 34 MG/DL (ref 8–22)
CALCIUM SERPL-MCNC: 9 MG/DL (ref 8.5–10.5)
CHLORIDE SERPL-SCNC: 101 MMOL/L (ref 96–112)
CO2 SERPL-SCNC: 20 MMOL/L (ref 20–33)
CREAT SERPL-MCNC: 1.8 MG/DL (ref 0.5–1.4)
ERYTHROCYTE [DISTWIDTH] IN BLOOD BY AUTOMATED COUNT: 41.4 FL (ref 35.9–50)
EST. AVERAGE GLUCOSE BLD GHB EST-MCNC: 146 MG/DL
GLUCOSE BLD-MCNC: 159 MG/DL (ref 65–99)
GLUCOSE BLD-MCNC: 171 MG/DL (ref 65–99)
GLUCOSE BLD-MCNC: 184 MG/DL (ref 65–99)
GLUCOSE BLD-MCNC: 224 MG/DL (ref 65–99)
GLUCOSE SERPL-MCNC: 161 MG/DL (ref 65–99)
HBA1C MFR BLD: 6.7 % (ref 0–5.6)
HCT VFR BLD AUTO: 40 % (ref 42–52)
HGB BLD-MCNC: 13.7 G/DL (ref 14–18)
MAGNESIUM SERPL-MCNC: 2 MG/DL (ref 1.5–2.5)
MCH RBC QN AUTO: 31.8 PG (ref 27–33)
MCHC RBC AUTO-ENTMCNC: 34.3 G/DL (ref 33.7–35.3)
MCV RBC AUTO: 92.8 FL (ref 81.4–97.8)
PHOSPHATE SERPL-MCNC: 3.1 MG/DL (ref 2.5–4.5)
PLATELET # BLD AUTO: 215 K/UL (ref 164–446)
PMV BLD AUTO: 11.4 FL (ref 9–12.9)
POTASSIUM SERPL-SCNC: 3.8 MMOL/L (ref 3.6–5.5)
RBC # BLD AUTO: 4.31 M/UL (ref 4.7–6.1)
SODIUM SERPL-SCNC: 135 MMOL/L (ref 135–145)
TROPONIN T SERPL-MCNC: 4087 NG/L (ref 6–19)
WBC # BLD AUTO: 10.6 K/UL (ref 4.8–10.8)

## 2020-01-13 PROCEDURE — 84100 ASSAY OF PHOSPHORUS: CPT

## 2020-01-13 PROCEDURE — A9270 NON-COVERED ITEM OR SERVICE: HCPCS | Performed by: INTERNAL MEDICINE

## 2020-01-13 PROCEDURE — 700102 HCHG RX REV CODE 250 W/ 637 OVERRIDE(OP): Performed by: INTERNAL MEDICINE

## 2020-01-13 PROCEDURE — 83735 ASSAY OF MAGNESIUM: CPT

## 2020-01-13 PROCEDURE — 83036 HEMOGLOBIN GLYCOSYLATED A1C: CPT

## 2020-01-13 PROCEDURE — 84484 ASSAY OF TROPONIN QUANT: CPT

## 2020-01-13 PROCEDURE — 82962 GLUCOSE BLOOD TEST: CPT | Mod: 91

## 2020-01-13 PROCEDURE — 770020 HCHG ROOM/CARE - TELE (206)

## 2020-01-13 PROCEDURE — 85027 COMPLETE CBC AUTOMATED: CPT

## 2020-01-13 PROCEDURE — 80048 BASIC METABOLIC PNL TOTAL CA: CPT

## 2020-01-13 PROCEDURE — A9270 NON-COVERED ITEM OR SERVICE: HCPCS | Performed by: HOSPITALIST

## 2020-01-13 PROCEDURE — 700102 HCHG RX REV CODE 250 W/ 637 OVERRIDE(OP): Performed by: HOSPITALIST

## 2020-01-13 PROCEDURE — 99232 SBSQ HOSP IP/OBS MODERATE 35: CPT | Performed by: INTERNAL MEDICINE

## 2020-01-13 PROCEDURE — 99233 SBSQ HOSP IP/OBS HIGH 50: CPT | Performed by: HOSPITALIST

## 2020-01-13 PROCEDURE — 700111 HCHG RX REV CODE 636 W/ 250 OVERRIDE (IP): Performed by: INTERNAL MEDICINE

## 2020-01-13 RX ORDER — CARVEDILOL 12.5 MG/1
12.5 TABLET ORAL 2 TIMES DAILY WITH MEALS
Status: DISCONTINUED | OUTPATIENT
Start: 2020-01-13 | End: 2020-01-14

## 2020-01-13 RX ORDER — LOSARTAN POTASSIUM 25 MG/1
50 TABLET ORAL
Status: DISCONTINUED | OUTPATIENT
Start: 2020-01-13 | End: 2020-01-14 | Stop reason: HOSPADM

## 2020-01-13 RX ORDER — FUROSEMIDE 20 MG/1
20 TABLET ORAL
Status: DISCONTINUED | OUTPATIENT
Start: 2020-01-13 | End: 2020-01-14 | Stop reason: HOSPADM

## 2020-01-13 RX ORDER — ISOSORBIDE DINITRATE 20 MG/1
20 TABLET ORAL 3 TIMES DAILY
Status: DISCONTINUED | OUTPATIENT
Start: 2020-01-13 | End: 2020-01-14

## 2020-01-13 RX ADMIN — POTASSIUM CHLORIDE 20 MEQ: 1500 TABLET, EXTENDED RELEASE ORAL at 04:06

## 2020-01-13 RX ADMIN — INSULIN HUMAN 2 UNITS: 100 INJECTION, SOLUTION PARENTERAL at 08:37

## 2020-01-13 RX ADMIN — INSULIN HUMAN 1 UNITS: 100 INJECTION, SOLUTION PARENTERAL at 18:21

## 2020-01-13 RX ADMIN — INSULIN HUMAN 1 UNITS: 100 INJECTION, SOLUTION PARENTERAL at 12:29

## 2020-01-13 RX ADMIN — ISOSORBIDE DINITRATE 20 MG: 20 TABLET ORAL at 08:35

## 2020-01-13 RX ADMIN — POTASSIUM CHLORIDE 20 MEQ: 1500 TABLET, EXTENDED RELEASE ORAL at 17:06

## 2020-01-13 RX ADMIN — FUROSEMIDE 20 MG: 10 INJECTION, SOLUTION INTRAMUSCULAR; INTRAVENOUS at 04:07

## 2020-01-13 RX ADMIN — SPIRONOLACTONE 25 MG: 25 TABLET ORAL at 04:09

## 2020-01-13 RX ADMIN — FUROSEMIDE 20 MG: 20 TABLET ORAL at 12:25

## 2020-01-13 RX ADMIN — INSULIN HUMAN 1 UNITS: 100 INJECTION, SOLUTION PARENTERAL at 21:21

## 2020-01-13 RX ADMIN — ATORVASTATIN CALCIUM 80 MG: 80 TABLET, FILM COATED ORAL at 17:06

## 2020-01-13 RX ADMIN — CARVEDILOL 12.5 MG: 12.5 TABLET, FILM COATED ORAL at 17:06

## 2020-01-13 RX ADMIN — ASPIRIN 81 MG: 81 TABLET, COATED ORAL at 04:06

## 2020-01-13 RX ADMIN — ISOSORBIDE DINITRATE 20 MG: 20 TABLET ORAL at 17:06

## 2020-01-13 RX ADMIN — LISINOPRIL 10 MG: 10 TABLET ORAL at 04:06

## 2020-01-13 RX ADMIN — TAMSULOSIN HYDROCHLORIDE 0.4 MG: 0.4 CAPSULE ORAL at 08:35

## 2020-01-13 RX ADMIN — LOSARTAN POTASSIUM 50 MG: 25 TABLET ORAL at 10:12

## 2020-01-13 RX ADMIN — ISOSORBIDE DINITRATE 20 MG: 20 TABLET ORAL at 12:25

## 2020-01-13 RX ADMIN — CARVEDILOL 6.25 MG: 6.25 TABLET, FILM COATED ORAL at 08:35

## 2020-01-13 RX ADMIN — PRASUGREL 10 MG: 10 TABLET, FILM COATED ORAL at 04:06

## 2020-01-13 RX ADMIN — POTASSIUM CHLORIDE 20 MEQ: 1500 TABLET, EXTENDED RELEASE ORAL at 12:25

## 2020-01-13 RX ADMIN — LABETALOL HYDROCHLORIDE 10 MG: 5 INJECTION INTRAVENOUS at 03:06

## 2020-01-13 ASSESSMENT — ENCOUNTER SYMPTOMS
COUGH: 1
CHEST TIGHTNESS: 0
SHORTNESS OF BREATH: 0
MUSCULOSKELETAL NEGATIVE: 1
NERVOUS/ANXIOUS: 1
FATIGUE: 1
GASTROINTESTINAL NEGATIVE: 1
SHORTNESS OF BREATH: 1
EYES NEGATIVE: 1
ABDOMINAL PAIN: 0
BLOOD IN STOOL: 0
FEVER: 0
PALPITATIONS: 0
NEUROLOGICAL NEGATIVE: 1
WEAKNESS: 1
DIZZINESS: 0

## 2020-01-13 NOTE — CARE PLAN
Problem: Safety  Goal: Will remain free from injury  Outcome: PROGRESSING AS EXPECTED  Goal: Will remain free from falls  Outcome: PROGRESSING AS EXPECTED     Patient remains free from injury and fall, has nonskid socks on, making sure there is adequate lighting in room, room is not cluttered, bed is in locked and low position.  Reinforcing use of call light when patient wants to get out of bed or up to bathroom.     Problem: Knowledge Deficit  Goal: Knowledge of disease process/condition, treatment plan, diagnostic tests, and medications will improve  Outcome: PROGRESSING AS EXPECTED  Goal: Knowledge of the prescribed therapeutic regimen will improve  Outcome: PROGRESSING AS EXPECTED     At this time patient more familiar with disease process, treatment plan, diagnostic tests, medications, etc.  Continuing to educate patient on various items and reinforcing teaching.

## 2020-01-13 NOTE — PROGRESS NOTES
[facility] Medicine Daily Progress Note    Date of Service  1/12/2020    Chief Complaint  59 y.o. male admitted 1/10/2020 with acute chest pain, NSTEMI admitted after he was taken to the Cath Lab where the patient was found to have an occlusion of his mid circumflex, the patient was successfully stented, post intervention the patient returned with some significant chest pain, medically treated, reviewed the patient had a lesion distal to the stented segment with a high-grade lesion and very distal thrombus.  The patient found with reduction of ejection fraction to 40%.    Hospital Course    Admitted with NSTEMI, status post stenting to the mid circumflex      Interval Problem Update  Patient seen and examined today.    Patient tolerating treatment and therapies.  All Data, Medication data reviewed.  Case discussed with nursing as available.  Plan of Care reviewed with patient and notified of changes.  1/11 the patient feels better today, no further severe chest pain, still has some chest discomfort, dark urine noted, no nausea vomiting, renal insufficiency, sinus rhythm, mild ectopy, pulse in the 82 range, blood pressure normal range.  Mild leukocytosis, mild anemia, potassium 3.3, being replaced, hematuria noted.  1/12 the patient improved today, no further chest pain, yesterday developed additional chest pain and was brought back to the angiography suite where the patient had a additional distal stent to the left circumflex, patient had episode of confusion overnight as well as brief SVT, medication being adjusted, the patient still denies significant alcohol use, currently no nausea vomiting, is awake alert and oriented x4, friends and family visiting  Consultants/Specialty  Pulmonary critical care  Cardiology    Code Status  Code    Disposition  Telemetry    Review of Systems  Review of Systems   Constitutional: Positive for malaise/fatigue.   HENT: Negative.    Eyes: Negative.    Respiratory: Positive for cough.  Negative for shortness of breath.    Cardiovascular: Positive for chest pain.   Gastrointestinal: Negative.    Genitourinary: Negative.    Musculoskeletal: Negative.    Skin: Negative.    Neurological: Negative.    Endo/Heme/Allergies: Negative.    Psychiatric/Behavioral: The patient is nervous/anxious.    All other systems reviewed and are negative.       Physical Exam  Pulse:  [71-98] 75  Resp:  [15-28] 15  BP: (108-165)/() 143/91  SpO2:  [95 %-99 %] 96 %    Physical Exam  Vitals signs and nursing note reviewed.   Constitutional:       Appearance: He is well-developed.   HENT:      Head: Normocephalic.   Eyes:      Pupils: Pupils are equal, round, and reactive to light.   Neck:      Musculoskeletal: Normal range of motion.   Cardiovascular:      Rate and Rhythm: Normal rate and regular rhythm.      Heart sounds: Normal heart sounds.   Pulmonary:      Effort: Pulmonary effort is normal.   Abdominal:      General: Bowel sounds are normal.      Palpations: Abdomen is soft.   Genitourinary:     Penis: Normal.       Rectum: Normal.   Musculoskeletal: Normal range of motion.   Skin:     General: Skin is warm.   Neurological:      Mental Status: He is alert and oriented to person, place, and time.         Fluids    Intake/Output Summary (Last 24 hours) at 1/12/2020 1640  Last data filed at 1/12/2020 1400  Gross per 24 hour   Intake 400 ml   Output 2100 ml   Net -1700 ml       Laboratory  Recent Labs     01/10/20  1320 01/11/20  0350 01/12/20  0350   WBC 14.1* 12.9* 11.6*   RBC 4.53* 4.06* 4.08*   HEMOGLOBIN 14.4 13.0* 12.7*   HEMATOCRIT 41.8* 37.7* 38.5*   MCV 92.3 92.9 94.4   MCH 31.8 32.0 31.1   MCHC 34.4 34.5 33.0*   RDW 41.1 41.9 42.0   PLATELETCT 181 168 198   MPV 12.2 12.3 11.7     Recent Labs     01/10/20  1320 01/11/20  0130 01/12/20  0350   SODIUM 134* 133* 135   POTASSIUM 3.3* 3.3* 3.4*   CHLORIDE 100 100 102   CO2 21 23 22   GLUCOSE 162* 174* 143*   BUN 22 25* 35*   CREATININE 1.56* 1.85* 1.80*   CALCIUM  8.7 8.2* 8.9             Recent Labs     01/11/20  0130   TRIGLYCERIDE 76   HDL 55   LDL 66       Imaging  DX-CHEST-PORTABLE (1 VIEW)   Final Result      No significant interval change.      US-RENAL   Final Result         1.  Bilateral nephrolithiasis, no hydronephrosis or obstructive changes are appreciated.   2.  Left upper pole renal cyst.      DX-CHEST-PORTABLE (1 VIEW)   Final Result      No acute cardiopulmonary abnormality identified.      EC-ECHOCARDIOGRAM COMPLETE W/O CONT   Final Result      CL-LEFT HEART CATHETERIZATION WITH POSSIBLE INTERVENTION   Final Result      DX-CHEST-PORTABLE (1 VIEW)   Final Result         1.  No acute cardiopulmonary disease.   2.  Cardiomegaly      CL-LEFT HEART CATHETERIZATION WITH POSSIBLE INTERVENTION    (Results Pending)        Assessment/Plan  NSTEMI (non-ST elevated myocardial infarction) (HCC)- (present on admission)  Assessment & Plan  Found to have a occlusion of the circumflex, status post drug-eluting stent placement  Second step angiography with distal stent placement  Maximize medical therapy  High intensity statin  Afterload reduction  Beta-blockade  Titrate as tolerated    Hypertensive urgency- (present on admission)  Assessment & Plan  Patient initiated on carvedilol, captopril  Titrate as indicated  Currently improved    CRISELDA (acute kidney injury) (HCC)- (present on admission)  Assessment & Plan  Likely perfusion, prerenal  Renal ultrasound with kidney stones, history of such, no obstruction  S/p gentle hydration  [place] has been removed and the patient is urinating    Delirium  Assessment & Plan  Likely multifactorial, unclear if the patient truly has effects from alcohol at this time  Monitor closely    Hypokalemia- (present on admission)  Assessment & Plan  Replaced, monitor    Hyponatremia- (present on admission)  Assessment & Plan  Mild, monitor, hydration    Transaminitis- (present on admission)  Assessment & Plan  Question of passive congestion, versus  other, monitor    Leukemoid reaction- (present on admission)  Assessment & Plan  No evidence of acute infection, monitor     Plan  Continue with medical therapy, adjust beta-blocker, blood pressure control  Monitor urine output  Avoid nephrotoxins  Telemonitoring  Medically complex high risk  Cardiology following  See orders    VTE prophylaxis: Dual antiplatelet therapy    I have performed a physical exam and reviewed and updated ROS and Plan today . In review of yesterday's note , there are no changes except as documented above.

## 2020-01-13 NOTE — PROGRESS NOTES
Pulmonary/Critical Care Medicine   Progress Note    Date of service: 1/13/2020  Time: 7:29 AM    Off nitro gtt, doing well. Transfer orders to be placed. No signs of impending withdrawal. [facility] Critical Care will sign off at this time. Please call with any concerns/questions.    AGNES Morley Jr.O.  [facility]

## 2020-01-13 NOTE — PROGRESS NOTES
Notified Dr. Dickson of troponin trended back up from previous, pt currently asymptomatic.  No new orders at this time.

## 2020-01-13 NOTE — PROGRESS NOTES
Cardiology Follow Up Progress Note    Date of Service  1/13/2020    Attending Physician  Jhonny Blum M.D.    Chief Complaint   Chest Pain    HPI  Conner Fox is a 59 y.o. male admitted 1/10/2020 with NSTEMI. Patient underwent coronary angiogram on 1/1/2020 which revealed patent mid LCx stent with 95% stenosis distal to the stent, 80-90% mid LAD with total occlusion of the distal LAD and 80% occlusion to the mid ramus. He underwent successful PCI of the Lcx with GARRY with plans for staged PCI of the LAD. Echocardiogram revealed reduced LVEF of 40%.     Past medical history significant for hypertension.    Interim Events  1/13/2020: Feeling well. Denies chest pain, mild dyspnea. Discussed possible staged PCI of LAD tomorrow, patient in agreement to proceed. No signifc    Review of Systems  Review of Systems   Constitutional: Positive for fatigue. Negative for fever.   Respiratory: Positive for shortness of breath. Negative for chest tightness.    Cardiovascular: Negative for chest pain, palpitations and leg swelling.   Gastrointestinal: Negative for abdominal pain and blood in stool.   Genitourinary: Negative for hematuria.   Musculoskeletal: Negative for gait problem.   Neurological: Positive for weakness. Negative for dizziness and syncope.   All other systems reviewed and are negative.      Vital signs in last 24 hours  Temp:  [36.3 °C (97.3 °F)-36.6 °C (97.8 °F)] 36.3 °C (97.3 °F)  Pulse:  [64-93] 77  Resp:  [14-24] 19  BP: (122-166)/() 135/83  SpO2:  [90 %-98 %] 95 %    Physical Exam  Physical Exam  Constitutional:       General: He is not in acute distress.     Appearance: Normal appearance.   HENT:      Head: Normocephalic.   Eyes:      Extraocular Movements: Extraocular movements intact.   Neck:      Musculoskeletal: Normal range of motion.   Cardiovascular:      Rate and Rhythm: Normal rate and regular rhythm.      Pulses: Normal pulses.      Heart sounds: Normal heart sounds. No murmur.    Pulmonary:      Effort: Pulmonary effort is normal.      Breath sounds: Normal breath sounds.   Abdominal:      General: There is no distension.      Palpations: Abdomen is soft.      Tenderness: There is no tenderness.   Musculoskeletal:      Right lower leg: No edema.      Left lower leg: No edema.   Skin:     General: Skin is warm and dry.   Neurological:      Mental Status: He is alert and oriented to person, place, and time.   Psychiatric:         Mood and Affect: Mood normal.         Thought Content: Thought content normal.         Judgment: Judgment normal.         Lab Review  Lab Results   Component Value Date/Time    WBC 10.6 01/13/2020 02:35 AM    RBC 4.31 (L) 01/13/2020 02:35 AM    HEMOGLOBIN 13.7 (L) 01/13/2020 02:35 AM    HEMATOCRIT 40.0 (L) 01/13/2020 02:35 AM    MCV 92.8 01/13/2020 02:35 AM    MCH 31.8 01/13/2020 02:35 AM    MCHC 34.3 01/13/2020 02:35 AM    MPV 11.4 01/13/2020 02:35 AM      Lab Results   Component Value Date/Time    SODIUM 135 01/13/2020 02:35 AM    POTASSIUM 3.8 01/13/2020 02:35 AM    CHLORIDE 101 01/13/2020 02:35 AM    CO2 20 01/13/2020 02:35 AM    GLUCOSE 161 (H) 01/13/2020 02:35 AM    BUN 34 (H) 01/13/2020 02:35 AM    CREATININE 1.80 (H) 01/13/2020 02:35 AM      Lab Results   Component Value Date/Time    ASTSGOT 89 (H) 01/11/2020 01:30 AM    ALTSGPT 30 01/11/2020 01:30 AM     Lab Results   Component Value Date/Time    CHOLSTRLTOT 136 01/11/2020 01:30 AM    LDL 66 01/11/2020 01:30 AM    HDL 55 01/11/2020 01:30 AM    TRIGLYCERIDE 76 01/11/2020 01:30 AM    TROPONINT 4087 (H) 01/13/2020 02:35 AM       No results for input(s): NTPROBNP in the last 72 hours.    Cardiac Imaging and Procedures Review  Coronary Angiogram 1/11/2020:  Pre-operative Diagnosis:  Status post stenting of the mid left circumflex artery for non-STEMI with recurrent angina     Post-operative Diagnosis:   1. Patent previously placed stent in the mid left circumflex artery with 95% stenosis distal to the stent 2.   Status post stenting of the distal left circumflex artery with 2.5 x 18 mm [place] drug eluting stent  3.  80% mid left anterior descending artery (LAD) stenosis with total occlusion of distal LAD  4.  70% proximal stenosis and 80% mid stenosis of the first diagonal branch of LAD    Renal US 1/11/2020:  1.  Bilateral nephrolithiasis, no hydronephrosis or obstructive changes are appreciated.  2.  Left upper pole renal cyst.    TTE 1/10/2020:  CONCLUSIONS  Moderately reduced left ventricular systolic function.  Left ventricular ejection fraction is visually estimated to be 40%.  Moderate concentric left ventricular hypertrophy.  Akinesis lateral wall.  Hypokinesis anterior wall.  Mild mitral regurgitation.  Aortic sclerosis without stenosis.  Mild aortic insufficiency.  Right heart pressures are consistent with moderate pulmonary   hypertension.  Ascending aorta is dilated with a diameter of 4.4 cm.  No prior study is available for comparison.        Assessment/Plan  CAD S/P NSTEMI:  -LHC on 1/11/2020 showed patent previously placed LCx stent, underwent PCI with GARRY to LCx distal to previous stent with residual stenosis of the LAD and first diagonal branch.   -Trop peak of 4087.  -Plan is for possible staged PCI of LAD in am pending renal function.   -LDL on 1/11/202 was 66, at goal of <70.   -DAPT (ASA 81 mg daily and Effient 10 mg daily) for minimum of one year.   -Continue Atorvastatin 80 mg daily, Isordil 20 mg daily and Losartan 50 mg daily.  -Coreg increased to 12.5 mg BID.     Ischemic CM:  -Net output 5L.   -Continue to optimize GDMT.  -Continue ARB/BB as above, Isordil 20 mg TID, Aldactone 25 mg daily and Furosemide 20 mg daily.     Ascending Aorta Dilation:  -4.4 cm per TTE.     HTN:  -Stable.   -Moderate LVH on TTE.     CRISELDA:  -Stable.   -Cr 1.8 this am.   -Renal US showed bilateral kidney stones.     Thank you for allowing us to participate in the care of this patient. We will continue to follow alongside you  in the care of this patient. Please let us know if you have any questions or concerns.       ALEXA Saba.   [facility]  [phone]

## 2020-01-13 NOTE — ASSESSMENT & PLAN NOTE
Likely multifactorial, unclear if the patient truly has effects from alcohol at this time  Monitor closely

## 2020-01-13 NOTE — PROGRESS NOTES
[facility] Medicine Daily Progress Note    Date of Service  1/13/2020    Chief Complaint  59 y.o. male admitted 1/10/2020 with acute chest pain, NSTEMI admitted after he was taken to the Cath Lab where the patient was found to have an occlusion of his mid circumflex, the patient was successfully stented, post intervention the patient returned with some significant chest pain, medically treated, reviewed the patient had a lesion distal to the stented segment with a high-grade lesion and very distal thrombus.  The patient found with reduction of ejection fraction to 40%.    Hospital Course    Admitted with NSTEMI, status post stenting to the mid circumflex      Interval Problem Update  Patient seen and examined today.    Patient tolerating treatment and therapies.  All Data, Medication data reviewed.  Case discussed with nursing as available.  Plan of Care reviewed with patient and notified of changes.  1/11 the patient feels better today, no further severe chest pain, still has some chest discomfort, dark urine noted, no nausea vomiting, renal insufficiency, sinus rhythm, mild ectopy, pulse in the 82 range, blood pressure normal range.  Mild leukocytosis, mild anemia, potassium 3.3, being replaced, hematuria noted.  1/12 the patient improved today, no further chest pain, yesterday developed additional chest pain and was brought back to the angiography suite where the patient had a additional distal stent to the left circumflex, patient had episode of confusion overnight as well as brief SVT, medication being adjusted, the patient still denies significant alcohol use, currently no nausea vomiting, is awake alert and oriented x4, friends and family visiting  1/13 patient without further chest pain, hemodynamically improved, mild dyspnea, urination is improved, renal function not optimal, blood pressure elevated, glycemic control could be improved, chest x-ray last night with some palmar edema.  EF 40%, LVH, ascending  aorta at 4.4 cm, moderate pulmonary pretension  Consultants/Specialty  Pulmonary critical care  Cardiology    Code Status  Code    Disposition  Telemetry    Review of Systems  Review of Systems   Constitutional: Positive for malaise/fatigue.   HENT: Negative.    Eyes: Negative.    Respiratory: Positive for cough. Negative for shortness of breath.    Cardiovascular: Positive for chest pain.   Gastrointestinal: Negative.    Genitourinary: Negative.    Musculoskeletal: Negative.    Skin: Negative.    Neurological: Negative.    Endo/Heme/Allergies: Negative.    Psychiatric/Behavioral: The patient is nervous/anxious.    All other systems reviewed and are negative.       Physical Exam  Temp:  [36.3 °C (97.3 °F)-36.6 °C (97.8 °F)] 36.3 °C (97.3 °F)  Pulse:  [64-93] 77  Resp:  [14-28] 19  BP: (122-166)/() 135/83  SpO2:  [90 %-98 %] 95 %    Physical Exam  Vitals signs and nursing note reviewed.   Constitutional:       Appearance: He is well-developed.   HENT:      Head: Normocephalic.   Eyes:      Pupils: Pupils are equal, round, and reactive to light.   Neck:      Musculoskeletal: Normal range of motion.   Cardiovascular:      Rate and Rhythm: Normal rate and regular rhythm.      Heart sounds: Normal heart sounds.   Pulmonary:      Effort: Pulmonary effort is normal.   Abdominal:      General: Bowel sounds are normal.      Palpations: Abdomen is soft.   Genitourinary:     Penis: Normal.       Rectum: Normal.   Musculoskeletal: Normal range of motion.   Skin:     General: Skin is warm.   Neurological:      Mental Status: He is alert and oriented to person, place, and time.         Fluids    Intake/Output Summary (Last 24 hours) at 1/13/2020 1124  Last data filed at 1/13/2020 1000  Gross per 24 hour   Intake 1000 ml   Output 2675 ml   Net -1675 ml       Laboratory  Recent Labs     01/11/20  0350 01/12/20  0350 01/13/20  0235   WBC 12.9* 11.6* 10.6   RBC 4.06* 4.08* 4.31*   HEMOGLOBIN 13.0* 12.7* 13.7*   HEMATOCRIT 37.7*  38.5* 40.0*   MCV 92.9 94.4 92.8   MCH 32.0 31.1 31.8   MCHC 34.5 33.0* 34.3   RDW 41.9 42.0 41.4   PLATELETCT 168 198 215   MPV 12.3 11.7 11.4     Recent Labs     01/12/20  0350 01/12/20  1845 01/13/20  0235   SODIUM 135 136 135   POTASSIUM 3.4* 3.7 3.8   CHLORIDE 102 101 101   CO2 22 23 20   GLUCOSE 143* 132* 161*   BUN 35* 39* 34*   CREATININE 1.80* 1.86* 1.80*   CALCIUM 8.9 9.0 9.0             Recent Labs     01/11/20  0130   TRIGLYCERIDE 76   HDL 55   LDL 66       Imaging  DX-CHEST-LIMITED (1 VIEW)   Final Result      1.  Stable exam with changes suggestive of interstitial edema.      DX-CHEST-PORTABLE (1 VIEW)   Final Result      No significant interval change.      US-RENAL   Final Result         1.  Bilateral nephrolithiasis, no hydronephrosis or obstructive changes are appreciated.   2.  Left upper pole renal cyst.      DX-CHEST-PORTABLE (1 VIEW)   Final Result      No acute cardiopulmonary abnormality identified.      EC-ECHOCARDIOGRAM COMPLETE W/O CONT   Final Result      CL-LEFT HEART CATHETERIZATION WITH POSSIBLE INTERVENTION   Final Result      DX-CHEST-PORTABLE (1 VIEW)   Final Result         1.  No acute cardiopulmonary disease.   2.  Cardiomegaly      CL-LEFT HEART CATHETERIZATION WITH POSSIBLE INTERVENTION    (Results Pending)        Assessment/Plan  NSTEMI (non-ST elevated myocardial infarction) (HCC)- (present on admission)  Assessment & Plan  Found to have a occlusion of the circumflex, status post drug-eluting stent placement  Second step angiography with distal stent placement  Maximize medical therapy  High intensity statin  Afterload reduction  Beta-blockade  Titrate as tolerated    Hypertensive urgency- (present on admission)  Assessment & Plan  Patient initiated on carvedilol, captopril  Titrate as indicated  Currently improved    CRISELDA (acute kidney injury) (HCC)- (present on admission)  Assessment & Plan  Likely perfusion, prerenal  Renal ultrasound with kidney stones, history of such, no  obstruction  S/p gentle hydration  [place] has been removed and the patient is urinating    Delirium  Assessment & Plan  Likely multifactorial, unclear if the patient truly has effects from alcohol at this time  Monitor closely    Hypokalemia- (present on admission)  Assessment & Plan  Replaced, monitor    Hyponatremia- (present on admission)  Assessment & Plan  Mild, monitor, hydration    Transaminitis- (present on admission)  Assessment & Plan  Question of passive congestion, versus other, monitor    Leukemoid reaction- (present on admission)  Assessment & Plan  No evidence of acute infection, monitor     Plan  Continue with medical therapy, adjust beta-blocker, blood pressure control  Monitor urine output, Lasix, changed to p.o.  Avoid nephrotoxins  Telemonitoring  Medically complex high risk  Cardiology following  Increase mobility, continue to monitor    See orders    VTE prophylaxis: Dual antiplatelet therapy    I have performed a physical exam and reviewed and updated ROS and Plan today . In review of yesterday's note , there are no changes except as documented above.

## 2020-01-13 NOTE — PROGRESS NOTES
"RN at bedside, pt wob increase, stated SOB and feeling of \" Parvin Patter in Chest\" EKG Ordered, MD paged  "

## 2020-01-14 VITALS
DIASTOLIC BLOOD PRESSURE: 86 MMHG | RESPIRATION RATE: 16 BRPM | HEART RATE: 69 BPM | OXYGEN SATURATION: 95 % | HEIGHT: 70 IN | TEMPERATURE: 96.8 F | SYSTOLIC BLOOD PRESSURE: 126 MMHG | BODY MASS INDEX: 32.89 KG/M2 | WEIGHT: 229.72 LBS

## 2020-01-14 DIAGNOSIS — N28.9 RENAL INSUFFICIENCY: ICD-10-CM

## 2020-01-14 PROBLEM — E87.1 HYPONATREMIA: Status: RESOLVED | Noted: 2020-01-10 | Resolved: 2020-01-14

## 2020-01-14 PROBLEM — R41.0 DELIRIUM: Status: RESOLVED | Noted: 2020-01-12 | Resolved: 2020-01-14

## 2020-01-14 PROBLEM — R74.01 TRANSAMINITIS: Status: RESOLVED | Noted: 2020-01-10 | Resolved: 2020-01-14

## 2020-01-14 PROBLEM — D72.823 LEUKEMOID REACTION: Status: RESOLVED | Noted: 2020-01-10 | Resolved: 2020-01-14

## 2020-01-14 PROBLEM — E87.6 HYPOKALEMIA: Status: RESOLVED | Noted: 2020-01-10 | Resolved: 2020-01-14

## 2020-01-14 LAB
ANION GAP SERPL CALC-SCNC: 10 MMOL/L (ref 0–11.9)
BUN SERPL-MCNC: 34 MG/DL (ref 8–22)
CALCIUM SERPL-MCNC: 9.3 MG/DL (ref 8.5–10.5)
CHLORIDE SERPL-SCNC: 102 MMOL/L (ref 96–112)
CO2 SERPL-SCNC: 22 MMOL/L (ref 20–33)
CREAT SERPL-MCNC: 1.87 MG/DL (ref 0.5–1.4)
ERYTHROCYTE [DISTWIDTH] IN BLOOD BY AUTOMATED COUNT: 41.5 FL (ref 35.9–50)
GLUCOSE BLD-MCNC: 136 MG/DL (ref 65–99)
GLUCOSE SERPL-MCNC: 138 MG/DL (ref 65–99)
HCT VFR BLD AUTO: 38.5 % (ref 42–52)
HGB BLD-MCNC: 12.8 G/DL (ref 14–18)
MAGNESIUM SERPL-MCNC: 1.9 MG/DL (ref 1.5–2.5)
MCH RBC QN AUTO: 31.1 PG (ref 27–33)
MCHC RBC AUTO-ENTMCNC: 33.2 G/DL (ref 33.7–35.3)
MCV RBC AUTO: 93.7 FL (ref 81.4–97.8)
NT-PROBNP SERPL IA-MCNC: 1933 PG/ML (ref 0–125)
PHOSPHATE SERPL-MCNC: 3.7 MG/DL (ref 2.5–4.5)
PLATELET # BLD AUTO: 240 K/UL (ref 164–446)
PMV BLD AUTO: 11.2 FL (ref 9–12.9)
POTASSIUM SERPL-SCNC: 4.1 MMOL/L (ref 3.6–5.5)
RBC # BLD AUTO: 4.11 M/UL (ref 4.7–6.1)
SODIUM SERPL-SCNC: 134 MMOL/L (ref 135–145)
WBC # BLD AUTO: 10 K/UL (ref 4.8–10.8)

## 2020-01-14 PROCEDURE — 85027 COMPLETE CBC AUTOMATED: CPT

## 2020-01-14 PROCEDURE — 700102 HCHG RX REV CODE 250 W/ 637 OVERRIDE(OP): Performed by: INTERNAL MEDICINE

## 2020-01-14 PROCEDURE — A9270 NON-COVERED ITEM OR SERVICE: HCPCS | Performed by: INTERNAL MEDICINE

## 2020-01-14 PROCEDURE — 84100 ASSAY OF PHOSPHORUS: CPT

## 2020-01-14 PROCEDURE — 99239 HOSP IP/OBS DSCHRG MGMT >30: CPT | Performed by: HOSPITALIST

## 2020-01-14 PROCEDURE — 80048 BASIC METABOLIC PNL TOTAL CA: CPT

## 2020-01-14 PROCEDURE — 83880 ASSAY OF NATRIURETIC PEPTIDE: CPT

## 2020-01-14 PROCEDURE — 82962 GLUCOSE BLOOD TEST: CPT

## 2020-01-14 PROCEDURE — A9270 NON-COVERED ITEM OR SERVICE: HCPCS | Performed by: HOSPITALIST

## 2020-01-14 PROCEDURE — 99232 SBSQ HOSP IP/OBS MODERATE 35: CPT | Performed by: INTERNAL MEDICINE

## 2020-01-14 PROCEDURE — 700102 HCHG RX REV CODE 250 W/ 637 OVERRIDE(OP): Performed by: HOSPITALIST

## 2020-01-14 PROCEDURE — 83735 ASSAY OF MAGNESIUM: CPT

## 2020-01-14 RX ORDER — LOSARTAN POTASSIUM 50 MG/1
50 TABLET ORAL DAILY
Qty: 30 TAB | Refills: 2 | Status: SHIPPED | OUTPATIENT
Start: 2020-01-15 | End: 2020-01-17

## 2020-01-14 RX ORDER — ISOSORBIDE MONONITRATE 30 MG/1
30 TABLET, EXTENDED RELEASE ORAL
Status: DISCONTINUED | OUTPATIENT
Start: 2020-01-14 | End: 2020-01-14 | Stop reason: HOSPADM

## 2020-01-14 RX ORDER — ATORVASTATIN CALCIUM 80 MG/1
80 TABLET, FILM COATED ORAL EVERY EVENING
Qty: 30 TAB | Refills: 2 | Status: SHIPPED | OUTPATIENT
Start: 2020-01-14 | End: 2020-01-17

## 2020-01-14 RX ORDER — AMLODIPINE BESYLATE 2.5 MG/1
2.5 TABLET ORAL DAILY
Qty: 30 TAB | Refills: 2 | Status: SHIPPED | OUTPATIENT
Start: 2020-01-15 | End: 2020-01-17

## 2020-01-14 RX ORDER — AMLODIPINE BESYLATE 2.5 MG/1
2.5 TABLET ORAL
Status: DISCONTINUED | OUTPATIENT
Start: 2020-01-14 | End: 2020-01-14 | Stop reason: HOSPADM

## 2020-01-14 RX ORDER — CARVEDILOL 25 MG/1
25 TABLET ORAL 2 TIMES DAILY WITH MEALS
Status: DISCONTINUED | OUTPATIENT
Start: 2020-01-14 | End: 2020-01-14 | Stop reason: HOSPADM

## 2020-01-14 RX ORDER — PRASUGREL 10 MG/1
10 TABLET, FILM COATED ORAL DAILY
Qty: 30 TAB | Refills: 11 | Status: SHIPPED | OUTPATIENT
Start: 2020-01-15 | End: 2020-01-17

## 2020-01-14 RX ORDER — ASPIRIN 81 MG/1
81 TABLET ORAL DAILY
Qty: 30 TAB | Refills: 11 | Status: SHIPPED | OUTPATIENT
Start: 2020-01-15 | End: 2020-01-17

## 2020-01-14 RX ORDER — CARVEDILOL 25 MG/1
25 TABLET ORAL 2 TIMES DAILY WITH MEALS
Qty: 60 TAB | Refills: 2 | Status: SHIPPED | OUTPATIENT
Start: 2020-01-14 | End: 2020-01-17

## 2020-01-14 RX ADMIN — AMLODIPINE BESYLATE 2.5 MG: 2.5 TABLET ORAL at 10:05

## 2020-01-14 RX ADMIN — PRASUGREL 10 MG: 10 TABLET, FILM COATED ORAL at 04:28

## 2020-01-14 RX ADMIN — ASPIRIN 81 MG: 81 TABLET, COATED ORAL at 04:28

## 2020-01-14 RX ADMIN — CARVEDILOL 12.5 MG: 12.5 TABLET, FILM COATED ORAL at 08:03

## 2020-01-14 RX ADMIN — SPIRONOLACTONE 25 MG: 25 TABLET ORAL at 04:28

## 2020-01-14 RX ADMIN — TAMSULOSIN HYDROCHLORIDE 0.4 MG: 0.4 CAPSULE ORAL at 08:03

## 2020-01-14 RX ADMIN — CYCLOBENZAPRINE 10 MG: 10 TABLET, FILM COATED ORAL at 00:34

## 2020-01-14 RX ADMIN — FUROSEMIDE 20 MG: 20 TABLET ORAL at 08:02

## 2020-01-14 RX ADMIN — LOSARTAN POTASSIUM 50 MG: 25 TABLET ORAL at 04:28

## 2020-01-14 RX ADMIN — ISOSORBIDE MONONITRATE 30 MG: 30 TABLET, EXTENDED RELEASE ORAL at 10:05

## 2020-01-14 RX ADMIN — ISOSORBIDE DINITRATE 20 MG: 20 TABLET ORAL at 04:28

## 2020-01-14 RX ADMIN — ACETAMINOPHEN 650 MG: 325 TABLET, FILM COATED ORAL at 00:31

## 2020-01-14 ASSESSMENT — ENCOUNTER SYMPTOMS
ABDOMINAL PAIN: 0
DIZZINESS: 0
CHEST TIGHTNESS: 0
BLOOD IN STOOL: 0
SHORTNESS OF BREATH: 0
FEVER: 0
PALPITATIONS: 0

## 2020-01-14 NOTE — CARE PLAN
Problem: Safety  Goal: Will remain free from injury  Outcome: PROGRESSING AS EXPECTED  Goal: Will remain free from falls  Outcome: PROGRESSING AS EXPECTED     Patient remains free from falls and injury.  Encouraging patient to call staff for assistance when getting out of bed, up to chair, etc.  Nonskid socks on patient, clutter free environment, adequate lighting.  Bed is in low and locked position.     Problem: Pain Management  Goal: Pain level will decrease to patient's comfort goal  Outcome: PROGRESSING AS EXPECTED     No complaints of pain at this time, no chest pain or shortness of breath reported.

## 2020-01-14 NOTE — DISCHARGE SUMMARY
Discharge Summary    CHIEF COMPLAINT ON ADMISSION  Chief Complaint   Patient presents with   • Shortness of Breath     x3 days, though over the last few hours he has been having more difficulty in breathing   • Chest Pressure     middle of his chest, started after coughing very hard three days ago.        Reason for Admission  Shortness of Breath      Admission Date  1/10/2020    CODE STATUS  Full Code    HPI & HOSPITAL COURSE  This is a 59 y.o. male here with chest pain. Mr. Fox has a history of hypertension though not on any medications that presented to the ER with chest pain.  He went to the cardiac Cath Lab and had stenting to the circumflex and was admitted to the intensive care unit.  As his blood pressure had been high multiple blood pressure medications were added.  He was initiated on statin and dual antiplatelet therapy which he is tolerated well.  His beta-blocker dose has been increased to Coreg 25 mg twice a day which he is tolerated.  He has currently chest pain-free.  His creatinine admission was 1.77 and today it remains elevated at 1.87.  The decision was made by Dr. Montano to allow him to go home today on the appropriate medications to follow-up in his office for a staged PCI of the LAD as an outpatient.  Today patient has been seen and evaluated and he is chest pain-free, able to ambulate without assistance, he is on room air, he is tolerating a cardiac diet.  He is fortunately a non-smoker.  Will be discharged home on a cardiac heart healthy diet.       Therefore, he is discharged in good and stable condition to home with close outpatient follow-up.    The patient met 2-midnight criteria for an inpatient stay at the time of discharge.    Discharge Date  1/14/20    FOLLOW UP ITEMS POST DISCHARGE  Cardiology Dr. Montano for eval of staged PCI  PCP Dr. De La Paz for cancer screening such as PSA and HbA1c 6.7    DISCHARGE DIAGNOSES  Active Problems:    NSTEMI (non-ST elevated myocardial infarction)  (HCC) POA: Yes    Postinfarction angina (HCC) POA: No    CRISELDA (acute kidney injury) (HCC) POA: Yes    Hypertensive urgency POA: Yes    Coronary artery disease involving native coronary artery with unstable angina pectoris (HCC) POA: No      Overview: 80% mid, 100% distal LAD      8%mid, 70% ostial first diagonal    Essential hypertension POA: Yes  Resolved Problems:    Delirium POA: Unknown    Leukemoid reaction POA: Yes    Transaminitis POA: Yes    Hyponatremia POA: Yes    Hypokalemia POA: Yes      FOLLOW UP  Future Appointments   Date Time Provider Department Center   1/23/2020 11:00 AM DASH Saba [facility] None     No follow-up provider specified.    MEDICATIONS ON DISCHARGE     Medication List      START taking these medications      Instructions   amLODIPine 2.5 MG Tabs  Start taking on:  January 15, 2020  Commonly known as:  NORVASC   Take 1 Tab by mouth every day.  Dose:  2.5 mg     aspirin 81 MG EC tablet  Start taking on:  January 15, 2020   Take 1 Tab by mouth every day.  Dose:  81 mg     atorvastatin 80 MG tablet  Commonly known as:  LIPITOR   Take 1 Tab by mouth every evening.  Dose:  80 mg     carvedilol 25 MG Tabs  Commonly known as:  COREG   Take 1 Tab by mouth 2 times a day, with meals.  Dose:  25 mg     losartan 50 MG Tabs  Start taking on:  January 15, 2020  Commonly known as:  COZAAR   Take 1 Tab by mouth every day.  Dose:  50 mg     prasugrel 10 MG Tabs  Start taking on:  January 15, 2020  Commonly known as:  EFFIENT   Take 1 Tab by mouth every day.  Dose:  10 mg        CONTINUE taking these medications      Instructions   ADVAIR HFA INH   Inhale 1 Puff by mouth Once.  Dose:  1 Puff     cyclobenzaprine 10 MG Tabs  Commonly known as:  FLEXERIL   Take 10 mg by mouth 3 times a day as needed for Moderate Pain.  Dose:  10 mg        STOP taking these medications    ibuprofen 800 MG Tabs  Commonly known as:  MOTRIN            Allergies  Allergies   Allergen Reactions   • Levofloxacin  Unspecified     GI Bleeding         DIET  Orders Placed This Encounter   Procedures   • Diet Order Cardiac     Standing Status:   Standing     Number of Occurrences:   1     Order Specific Question:   Diet:     Answer:   Cardiac [6]       ACTIVITY  As tolerated    CONSULTATIONS  Critical care  Cardiology     PROCEDURES  Heart cath 1/11/20 by Dr. Weathers:    Post-operative Diagnosis:   1. Patent previously placed stent in the mid left circumflex artery with 95% stenosis distal to the stent 2.  Status post stenting of the distal left circumflex artery with 2.5 x 18 mm [place] drug eluting stent  3.  80% mid left anterior descending artery (LAD) stenosis with total occlusion of distal LAD  4.  70% proximal stenosis and 80% mid stenosis of the first diagonal branch of LAD     Procedure:  1.  Selective angiography of left coronary artery  2.  Percutaneous intervention the left circumflex artery  3.  Supervision of moderate conscious sedation  LABORATORY  Lab Results   Component Value Date    SODIUM 134 (L) 01/14/2020    POTASSIUM 4.1 01/14/2020    CHLORIDE 102 01/14/2020    CO2 22 01/14/2020    GLUCOSE 138 (H) 01/14/2020    BUN 34 (H) 01/14/2020    CREATININE 1.87 (H) 01/14/2020    HbA1c 6.7  HDL 55  LDL 66  Lab Results   Component Value Date    WBC 10.0 01/14/2020    HEMOGLOBIN 12.8 (L) 01/14/2020    HEMATOCRIT 38.5 (L) 01/14/2020    PLATELETCT 240 01/14/2020      Echocardiogram:  CONCLUSIONS  Moderately reduced left ventricular systolic function.  Left ventricular ejection fraction is visually estimated to be 40%.  Moderate concentric left ventricular hypertrophy.  Akinesis lateral wall.  Hypokinesis anterior wall.  Mild mitral regurgitation.  Aortic sclerosis without stenosis.  Mild aortic insufficiency.  Right heart pressures are consistent with moderate pulmonary   hypertension.  Ascending aorta is dilated with a diameter of 4.4 cm.  No prior study is available for comparison  DX-CHEST-LIMITED (1 VIEW)   Final  Result      1.  Stable exam with changes suggestive of interstitial edema.      DX-CHEST-PORTABLE (1 VIEW)   Final Result      No significant interval change.      US-RENAL   Final Result         1.  Bilateral nephrolithiasis, no hydronephrosis or obstructive changes are appreciated.   2.  Left upper pole renal cyst.      DX-CHEST-PORTABLE (1 VIEW)   Final Result      No acute cardiopulmonary abnormality identified.      EC-ECHOCARDIOGRAM COMPLETE W/O CONT   Final Result      CL-LEFT HEART CATHETERIZATION WITH POSSIBLE INTERVENTION   Final Result      DX-CHEST-PORTABLE (1 VIEW)   Final Result         1.  No acute cardiopulmonary disease.   2.  Cardiomegaly      CL-LEFT HEART CATHETERIZATION WITH POSSIBLE INTERVENTION    (Results Pending)       Total time of the discharge process exceeds 32 minutes.

## 2020-01-14 NOTE — DISCHARGE INSTRUCTIONS
Discharge Instructions    Discharged to home by car with relative. Discharged via wheelchair, hospital escort: Yes.  Special equipment needed: Not Applicable    Be sure to schedule a follow-up appointment with your primary care doctor or any specialists as instructed.     Discharge Plan:   Influenza Vaccine Indication: Patient Refuses    I understand that a diet low in cholesterol, fat, and sodium is recommended for good health. Unless I have been given specific instructions below for another diet, I accept this instruction as my diet prescription.   Other diet: Cardiac    Special Instructions: Diagnosis:  Acute Coronary Syndrome (ACS) is a diagnosis that encompasses cardiac-related chest pain and heart attack. ACS occurs when the blood flow to the heart muscle is severely reduced or cut off completely due to a slow process called atherosclerosis.  Atherosclerosis is a disease in which the coronary arteries become narrow from a buildup of fat, cholesterol, and other substances that combine to form plaque. If the plaque breaks, a blood clot will form and block the blood flow to the heart muscle. This lack of blood flow can cause damage or death to the heart muscle which is called a heart attack or Myocardial Infarction (MI). There are two different types of MIs:  ST Elevation Myocardial Infarction or STEMI (the most severe type of heart attack) and Non-ST Elevation Myocardial Infarction or NSTEMI.    Treatment Plan:  · Cardiac Diet  - Low fat, low salt, low cholesterol   · Cardiac Rehab  - Your doctor has ordered you a referral to [facility].  Call [phone] to schedule an appointment.  · Attend my follow-up appointment with my Cardiologist.  · Take my medications as prescribed by my doctor  · Exercise daily  · lower my blood pressure    Medications:  Certain medications are used to treat ACS.  Remember to always take medications as prescribed and never stop talking medications unless told by your doctor.    You  have been prescribed the following medicatons:    Statin - Statin atrovastatin is used to lower cholesterol.    · Is patient discharged on Warfarin / Coumadin?   No     Depression / Suicide Risk    As you are discharged from this [facility] facility, it is important to learn how to keep safe from harming yourself.    Recognize the warning signs:  · Abrupt changes in personality, positive or negative- including increase in energy   · Giving away possessions  · Change in eating patterns- significant weight changes-  positive or negative  · Change in sleeping patterns- unable to sleep or sleeping all the time   · Unwillingness or inability to communicate  · Depression  · Unusual sadness, discouragement and loneliness  · Talk of wanting to die  · Neglect of personal appearance   · Rebelliousness- reckless behavior  · Withdrawal from people/activities they love  · Confusion- inability to concentrate     If you or a loved one observes any of these behaviors or has concerns about self-harm, here's what you can do:  · Talk about it- your feelings and reasons for harming yourself  · Remove any means that you might use to hurt yourself (examples: pills, rope, extension cords, firearm)  · Get professional help from the community (Mental Health, Substance Abuse, psychological counseling)  · Do not be alone:Call your Safe Contact- someone whom you trust who will be there for you.  · Call your local CRISIS HOTLINE 608-1027 or 551-121-5625  · Call your local Children's Mobile Crisis Response Team [place] (775) 741-5510 or www.Dextr  · Call the toll free National Suicide Prevention Hotlines   · National Suicide Prevention Lifeline 991-138-AJMP (7560)  · National Hope Line Network 800-SUICIDE (480-6953)      Acute Coronary Syndrome  Acute coronary syndrome (ACS) is a serious problem in which there is suddenly not enough blood and oxygen supplied to the heart. ACS may mean that one or more of the blood vessels in  your heart (coronary arteries) may be blocked. ACS can result in chest pain or a heart attack (myocardial infarction or MI).  What are the causes?  This condition is caused by atherosclerosis, which is the buildup of fat and cholesterol (plaque) on the inside of the arteries. Over time, the plaque may narrow or block the artery, and this will lessen blood flow to the heart. Plaque can also become weak and break off within a coronary artery to form a clot and cause a sudden blockage.  What increases the risk?  The risk factors of this condition include:  · High cholesterol levels.  · High blood pressure (hypertension).  · Smoking.  · Diabetes.  · Age.  · Family history of chest pain, heart disease, or stroke.  · Lack of exercise.  What are the signs or symptoms?  The most common signs of this condition include:  · Chest pain, which can be:  ¨ A crushing or squeezing in the chest.  ¨ A tightness, pressure, fullness, or heaviness in the chest.  ¨ Present for more than a few minutes, or it can stop and recur.  · Pain in the arms, neck, jaw, or back.  · Unexplained heartburn or indigestion.  · Shortness of breath.  · Nausea.  · Sudden cold sweats.  · Feeling light-headed or dizzy.  Sometimes, this condition has no symptoms.  How is this diagnosed?  ACS may be diagnosed through the following tests:  · Electrocardiogram (ECG).  · Blood tests.  · Coronary angiogram. This is a procedure to look at the coronary arteries to see if there is any blockage.  How is this treated?  Treatment for ACS may include:  · Healthy behavioral changes to reduce or control risk factors.  · Medicine.  · Coronary stenting. A stent helps to keep an artery open.  · Coronary angioplasty. This procedure widens a narrowed or blocked artery.  · Coronary artery bypass surgery. This will allow your blood to pass the blockage (bypass) to reach your heart.  Follow these instructions at home:  Eating and drinking  · Follow a heart-healthy diet. A dietitian  can you help to educate you about healthy food options and changes.  · Use healthy cooking methods such as roasting, grilling, broiling, baking, poaching, steaming, or stir-frying. Talk to a dietitian to learn more about healthy cooking methods.  Medicines  · Take medicines only as directed by your health care provider.  · Do not take the following medicines unless your health care provider approves:  ¨ Nonsteroidal anti-inflammatory drugs (NSAIDs), such as ibuprofen, naproxen, or celecoxib.  ¨ Vitamin supplements that contain vitamin A, vitamin E, or both.  ¨ Hormone replacement therapy that contains estrogen with or without progestin.  · Stop illegal drug use.  Activity  · Follow an exercise program that is approved by your health care provider.  · Plan rest periods when you are fatigued.  Lifestyle  · Do not use any tobacco products, including cigarettes, chewing tobacco, or electronic cigarettes. If you need help quitting, ask your health care provider.  · If you drink alcohol, and your health care provider approves, limit your alcohol intake to no more than 1 drink per day. One drink equals 12 ounces of beer, 5 ounces of wine, or 1½ ounces of hard liquor.  · Learn to manage stress.  · Maintain a healthy weight. Lose weight as approved by your health care provider.  General instructions  · Manage other health conditions, such as hypertension and diabetes, as directed by your health care provider.  · Keep all follow-up visits as directed by your health care provider. This is important.  · Your health care provider may ask you to monitor your blood pressure. A blood pressure reading consists of a higher number over a lower number, such as 110 over 72, written as 110/72. Ideally, your blood pressure should be:  ¨ Below 140/90 if you have no other medical conditions.  ¨ Below 130/80 if you have diabetes or kidney disease.  Get help right away if:  · You have pain in your chest, neck, arm, jaw, stomach, or back that  lasts more than a few minutes, is recurring, or is not relieved by taking medicine under your tongue (sublingual nitroglycerin).  · You have profuse sweating without cause.  · You have unexplained:  ¨ Heartburn or indigestion.  ¨ Shortness of breath or difficulty breathing.  ¨ Nausea or vomiting.  ¨ Fatigue.  ¨ Feelings of nervousness or anxiety.  ¨ Weakness.  ¨ Diarrhea.  · You have sudden light-headedness or dizziness.  · You faint.  These symptoms may represent a serious problem that is an emergency. Do not wait to see if the symptoms will go away. Get medical help right away. Call your local emergency services (911 in the [place] Do not drive yourself to the clinic or hospital.   This information is not intended to replace advice given to you by your health care provider. Make sure you discuss any questions you have with your health care provider.  Document Released: 12/18/2006 Document Revised: 05/31/2017 Document Reviewed: 04/21/2015  Elsevier Interactive Patient Education © 2017 [employer]

## 2020-01-14 NOTE — PROGRESS NOTES
Cardiology Follow Up Progress Note    Date of Service  1/14/2020    Attending Physician  Jhonny Blum M.D.    Chief Complaint   Chest Pain    HPI  Conner Fox is a 59 y.o. male admitted 1/10/2020 with NSTEMI. Patient underwent coronary angiogram on 1/1/2020 which revealed patent mid LCx stent with 95% stenosis distal to the stent, 80-90% mid LAD with total occlusion of the distal LAD and 80% occlusion to the mid ramus. He underwent successful PCI of the Lcx with GARRY with plans for staged PCI of the LAD. Echocardiogram revealed reduced LVEF of 40%.     Past medical history significant for hypertension.    Interim Events  1/13/2020: Feeling well. Denies chest pain, mild dyspnea. Discussed possible staged PCI of LAD tomorrow, patient in agreement to proceed.     1/14/2020: Resting in chair at bedside. Denies any concerns or complaints this am. States that he has been ambulating in the [address] without any chest pain or dyspnea. No significant events overnight. Discussed outpatient staged cardiac catheterization to allow time for patients renal function to stabilize, patient is agreement. Plan is likely home today.     Review of Systems  Review of Systems   Constitutional: Negative for fever.   Respiratory: Negative for chest tightness and shortness of breath.    Cardiovascular: Negative for chest pain, palpitations and leg swelling.   Gastrointestinal: Negative for abdominal pain and blood in stool.   Genitourinary: Negative for hematuria.   Musculoskeletal: Negative for gait problem.   Neurological: Negative for dizziness and syncope.   All other systems reviewed and are negative.      Vital signs in last 24 hours  Temp:  [36 °C (96.8 °F)-36.4 °C (97.6 °F)] 36 °C (96.8 °F)  Pulse:  [69-90] 69  Resp:  [16-18] 16  BP: (126-142)/(70-98) 126/86  SpO2:  [93 %-96 %] 95 %    Physical Exam  Physical Exam  Constitutional:       General: He is not in acute distress.     Appearance: Normal appearance.   HENT:       Head: Normocephalic.   Eyes:      Extraocular Movements: Extraocular movements intact.   Neck:      Musculoskeletal: Normal range of motion.   Cardiovascular:      Rate and Rhythm: Normal rate and regular rhythm.      Pulses: Normal pulses.      Heart sounds: Normal heart sounds. No murmur.   Pulmonary:      Effort: Pulmonary effort is normal.      Breath sounds: Normal breath sounds.   Abdominal:      General: There is no distension.      Palpations: Abdomen is soft.      Tenderness: There is no tenderness.   Musculoskeletal:         General: No swelling.   Skin:     General: Skin is warm and dry.      Comments: Right radial cath site intact with some ecchymosis observed.    Neurological:      Mental Status: He is alert and oriented to person, place, and time.   Psychiatric:         Mood and Affect: Mood normal.         Thought Content: Thought content normal.         Judgment: Judgment normal.         Lab Review  Lab Results   Component Value Date/Time    WBC 10.0 01/14/2020 04:40 AM    RBC 4.11 (L) 01/14/2020 04:40 AM    HEMOGLOBIN 12.8 (L) 01/14/2020 04:40 AM    HEMATOCRIT 38.5 (L) 01/14/2020 04:40 AM    MCV 93.7 01/14/2020 04:40 AM    MCH 31.1 01/14/2020 04:40 AM    MCHC 33.2 (L) 01/14/2020 04:40 AM    MPV 11.2 01/14/2020 04:40 AM      Lab Results   Component Value Date/Time    SODIUM 134 (L) 01/14/2020 04:40 AM    POTASSIUM 4.1 01/14/2020 04:40 AM    CHLORIDE 102 01/14/2020 04:40 AM    CO2 22 01/14/2020 04:40 AM    GLUCOSE 138 (H) 01/14/2020 04:40 AM    BUN 34 (H) 01/14/2020 04:40 AM    CREATININE 1.87 (H) 01/14/2020 04:40 AM      Lab Results   Component Value Date/Time    ASTSGOT 89 (H) 01/11/2020 01:30 AM    ALTSGPT 30 01/11/2020 01:30 AM     Lab Results   Component Value Date/Time    CHOLSTRLTOT 136 01/11/2020 01:30 AM    LDL 66 01/11/2020 01:30 AM    HDL 55 01/11/2020 01:30 AM    TRIGLYCERIDE 76 01/11/2020 01:30 AM    TROPONINT 4087 (H) 01/13/2020 02:35 AM       Recent Labs     01/14/20  0440   NTPROBNP  1933*       Cardiac Imaging and Procedures Review  Coronary Angiogram 1/10/2020:  Conclusions  1. 100% thrombotically occluded LCx culprit.  2. 80% focal mid ramus stenosis.  3. 80% focal mid LAD stenosis.  4. No ascending arch or thoracic descending aortic dissection.  5. LVEF 50% prior to intervention.  6. Successful PCI culprit LCx (3.0 x 30 mm Abdullahi GARRY), good result withresidual distal stent thrombus but SHERRI-3 flow.     Recommendations  * Continue medical management and risk factor modification.  * Effient for at least one year and Aspirin indefinitely.  * Staged PCI LAD and ramus and reevaluation of distal LCx within 6 to 12 weeks.    Coronary Angiogram 1/11/2020:  Pre-operative Diagnosis:  Status post stenting of the mid left circumflex artery for non-STEMI with recurrent angina     Post-operative Diagnosis:   1. Patent previously placed stent in the mid left circumflex artery with 95% stenosis distal to the stent 2.  Status post stenting of the distal left circumflex artery with 2.5 x 18 mm Abdullahi drug eluting stent  3.  80% mid left anterior descending artery (LAD) stenosis with total occlusion of distal LAD  4.  70% proximal stenosis and 80% mid stenosis of the first diagonal branch of LAD    Renal US 1/11/2020:  1.  Bilateral nephrolithiasis, no hydronephrosis or obstructive changes are appreciated.  2.  Left upper pole renal cyst.    TTE 1/10/2020:  CONCLUSIONS  Moderately reduced left ventricular systolic function.  Left ventricular ejection fraction is visually estimated to be 40%.  Moderate concentric left ventricular hypertrophy.  Akinesis lateral wall.  Hypokinesis anterior wall.  Mild mitral regurgitation.  Aortic sclerosis without stenosis.  Mild aortic insufficiency.  Right heart pressures are consistent with moderate pulmonary   hypertension.  Ascending aorta is dilated with a diameter of 4.4 cm.  No prior study is available for comparison.        Assessment/Plan  CAD S/P NSTEMI:  -[facility] on  1/11/2020 showed patent previously placed LCx stent (placed on 1/10/2020), underwent PCI with GARRY to LCx distal to previous stent with residual stenosis of the LAD and first diagonal branch.   -Plan is for outpatient staged PCI of the LAD in the next 2-3 weeks pending renal function.   -LDL on 1/11/202 was 66, at goal of <70.   -DAPT (ASA 81 mg daily and Effient 10 mg daily) for minimum of one year.   -Continue Atorvastatin 80 mg daily, Isordil 20 mg daily and Losartan 50 mg daily.  -Coreg increased to 25 mg BID.     Ischemic CM (EF40%):  -Continue ARB/BB as above, aldactone 25 mg daily and Furosemide 20 mg daily.  -Transitioned Isordil to IMDUR 30 mg daily.     Ascending Aorta Dilation:  -4.4 cm per TTE.   -Continue to monitor,     HTN:  -Continue to be slightly elevated.  -Coreg increased as above and Norvasc 2.5 mg daily started.   -Moderate LVH on TTE.     CRISELDA:  -Stable.   -Cr 1.8 this am.   -Renal US showed bilateral kidney stones.     Thank you for allowing us to participate in the care of this patient. Patient is stable from cardiology standpoint for discharge with close cardiology follow up as below.  Please let us know if you have any questions or concerns.     Future Appointments   Date Time Provider Department Center   1/23/2020 11:00 AM ALEXA Saba. [facility] None     ALEXA Saba.   [facility]  [phone]

## 2020-01-14 NOTE — CARE PLAN
Problem: Communication  Goal: The ability to communicate needs accurately and effectively will improve  Intervention: Educate patient and significant other/support system about the plan of care, procedures, treatments, medications and allow for questions  Note:   Educated patient on the importance of communicating needs and wants. Patient expressed understanding.      Problem: Mobility  Goal: Risk for activity intolerance will decrease  Intervention: Assess and monitor signs of activity intolerance  Note:   Patient up self. Educated on fall risk and use of call light if he needs anything.

## 2020-01-14 NOTE — DISCHARGE PLANNING
Care Transition Team Discharge Planning    Anticipated Discharge Disposition: TBD    Action: Lsw completed work excuse letter for pt.    Lsw provided letter to bedside.    Barriers to Discharge: TBD    Plan: f/u w/ medical team, pt

## 2020-01-17 ENCOUNTER — APPOINTMENT (OUTPATIENT)
Dept: RADIOLOGY | Facility: MEDICAL CENTER | Age: 60
End: 2020-01-17
Attending: EMERGENCY MEDICINE
Payer: COMMERCIAL

## 2020-01-17 ENCOUNTER — TELEPHONE (OUTPATIENT)
Dept: CARDIOLOGY | Facility: MEDICAL CENTER | Age: 60
End: 2020-01-17

## 2020-01-17 ENCOUNTER — HOSPITAL ENCOUNTER (EMERGENCY)
Facility: MEDICAL CENTER | Age: 60
End: 2020-01-18
Attending: EMERGENCY MEDICINE
Payer: COMMERCIAL

## 2020-01-17 DIAGNOSIS — R23.1 PALLOR OF EXTREMITY: ICD-10-CM

## 2020-01-17 DIAGNOSIS — R20.2 PARESTHESIAS: ICD-10-CM

## 2020-01-17 DIAGNOSIS — I73.9 VASOSPASM (HCC): ICD-10-CM

## 2020-01-17 LAB
ALBUMIN SERPL BCP-MCNC: 3.9 G/DL (ref 3.2–4.9)
ALBUMIN/GLOB SERPL: 1 G/DL
ALP SERPL-CCNC: 81 U/L (ref 30–99)
ALT SERPL-CCNC: 32 U/L (ref 2–50)
ANION GAP SERPL CALC-SCNC: 8 MMOL/L (ref 0–11.9)
AST SERPL-CCNC: 22 U/L (ref 12–45)
BASOPHILS # BLD AUTO: 0.4 % (ref 0–1.8)
BASOPHILS # BLD: 0.04 K/UL (ref 0–0.12)
BILIRUB SERPL-MCNC: 0.6 MG/DL (ref 0.1–1.5)
BUN SERPL-MCNC: 45 MG/DL (ref 8–22)
CALCIUM SERPL-MCNC: 9.8 MG/DL (ref 8.5–10.5)
CHLORIDE SERPL-SCNC: 100 MMOL/L (ref 96–112)
CO2 SERPL-SCNC: 24 MMOL/L (ref 20–33)
CREAT SERPL-MCNC: 2.21 MG/DL (ref 0.5–1.4)
CRP SERPL HS-MCNC: 4.83 MG/DL (ref 0–0.75)
EKG IMPRESSION: NORMAL
EOSINOPHIL # BLD AUTO: 0.07 K/UL (ref 0–0.51)
EOSINOPHIL NFR BLD: 0.7 % (ref 0–6.9)
ERYTHROCYTE [DISTWIDTH] IN BLOOD BY AUTOMATED COUNT: 41.8 FL (ref 35.9–50)
ERYTHROCYTE [SEDIMENTATION RATE] IN BLOOD BY WESTERGREN METHOD: 33 MM/HOUR (ref 0–20)
GLOBULIN SER CALC-MCNC: 4 G/DL (ref 1.9–3.5)
GLUCOSE SERPL-MCNC: 123 MG/DL (ref 65–99)
HCT VFR BLD AUTO: 43 % (ref 42–52)
HGB BLD-MCNC: 14.4 G/DL (ref 14–18)
IMM GRANULOCYTES # BLD AUTO: 0.07 K/UL (ref 0–0.11)
IMM GRANULOCYTES NFR BLD AUTO: 0.7 % (ref 0–0.9)
LYMPHOCYTES # BLD AUTO: 2.55 K/UL (ref 1–4.8)
LYMPHOCYTES NFR BLD: 23.9 % (ref 22–41)
MCH RBC QN AUTO: 31.2 PG (ref 27–33)
MCHC RBC AUTO-ENTMCNC: 33.5 G/DL (ref 33.7–35.3)
MCV RBC AUTO: 93.3 FL (ref 81.4–97.8)
MONOCYTES # BLD AUTO: 0.93 K/UL (ref 0–0.85)
MONOCYTES NFR BLD AUTO: 8.7 % (ref 0–13.4)
NEUTROPHILS # BLD AUTO: 7.03 K/UL (ref 1.82–7.42)
NEUTROPHILS NFR BLD: 65.6 % (ref 44–72)
NRBC # BLD AUTO: 0 K/UL
NRBC BLD-RTO: 0 /100 WBC
PLATELET # BLD AUTO: 370 K/UL (ref 164–446)
PMV BLD AUTO: 10.7 FL (ref 9–12.9)
POTASSIUM SERPL-SCNC: 4.6 MMOL/L (ref 3.6–5.5)
PROT SERPL-MCNC: 7.9 G/DL (ref 6–8.2)
RBC # BLD AUTO: 4.61 M/UL (ref 4.7–6.1)
SODIUM SERPL-SCNC: 132 MMOL/L (ref 135–145)
TROPONIN T SERPL-MCNC: 3049 NG/L (ref 6–19)
WBC # BLD AUTO: 10.7 K/UL (ref 4.8–10.8)

## 2020-01-17 PROCEDURE — 93930 UPPER EXTREMITY STUDY: CPT

## 2020-01-17 PROCEDURE — 86140 C-REACTIVE PROTEIN: CPT

## 2020-01-17 PROCEDURE — 85025 COMPLETE CBC W/AUTO DIFF WBC: CPT

## 2020-01-17 PROCEDURE — 85652 RBC SED RATE AUTOMATED: CPT

## 2020-01-17 PROCEDURE — 84484 ASSAY OF TROPONIN QUANT: CPT

## 2020-01-17 PROCEDURE — 93005 ELECTROCARDIOGRAM TRACING: CPT

## 2020-01-17 PROCEDURE — 71250 CT THORAX DX C-: CPT

## 2020-01-17 PROCEDURE — 93005 ELECTROCARDIOGRAM TRACING: CPT | Performed by: EMERGENCY MEDICINE

## 2020-01-17 PROCEDURE — 71045 X-RAY EXAM CHEST 1 VIEW: CPT

## 2020-01-17 PROCEDURE — 99285 EMERGENCY DEPT VISIT HI MDM: CPT

## 2020-01-17 PROCEDURE — 80053 COMPREHEN METABOLIC PANEL: CPT

## 2020-01-17 RX ORDER — LOSARTAN POTASSIUM 50 MG/1
50 TABLET ORAL DAILY
COMMUNITY
End: 2020-01-23 | Stop reason: SDUPTHER

## 2020-01-17 RX ORDER — CARVEDILOL 25 MG/1
25 TABLET ORAL 2 TIMES DAILY WITH MEALS
Status: ON HOLD | COMMUNITY
End: 2020-07-28

## 2020-01-17 RX ORDER — LISINOPRIL 10 MG/1
10 TABLET ORAL DAILY
COMMUNITY
End: 2020-01-23

## 2020-01-17 RX ORDER — AMLODIPINE BESYLATE 2.5 MG/1
2.5 TABLET ORAL DAILY
COMMUNITY
End: 2020-02-06

## 2020-01-17 RX ORDER — ATORVASTATIN CALCIUM 80 MG/1
80 TABLET, FILM COATED ORAL NIGHTLY
Status: ON HOLD | COMMUNITY
End: 2020-07-28

## 2020-01-17 RX ORDER — PRASUGREL 10 MG/1
10 TABLET, FILM COATED ORAL DAILY
Status: ON HOLD | COMMUNITY
End: 2020-07-28

## 2020-01-17 SDOH — HEALTH STABILITY: MENTAL HEALTH: HOW OFTEN DO YOU HAVE A DRINK CONTAINING ALCOHOL?: NOT ASKED

## 2020-01-17 ASSESSMENT — LIFESTYLE VARIABLES: DO YOU DRINK ALCOHOL: NO

## 2020-01-17 NOTE — TELEPHONE ENCOUNTER
JALEESA    Pt was rounded on by JALEESA while in Hospital. Pt feels he's having side affects to medication's he's taking. States he thinks it may be a combination of all the medication's. Please call pt to advise at [phone].

## 2020-01-18 VITALS
DIASTOLIC BLOOD PRESSURE: 72 MMHG | TEMPERATURE: 98 F | BODY MASS INDEX: 31.75 KG/M2 | OXYGEN SATURATION: 98 % | RESPIRATION RATE: 18 BRPM | HEIGHT: 70 IN | HEART RATE: 67 BPM | WEIGHT: 221.78 LBS | SYSTOLIC BLOOD PRESSURE: 122 MMHG

## 2020-01-18 NOTE — ED NOTES
Med Rec Updated and Complete per Pt at bedside with RX bottles (returned)  Allergies Reviewed  No PO ABX last 14 days.    Pt taking Effient 10mg daily with his last dose 01/17/20 @ 0900.    Pt taking LD ASA daily.    Pt denies OTC medication at this time.

## 2020-01-18 NOTE — DISCHARGE INSTRUCTIONS
Increased amlodipine from 2.5 mg to 5 mg daily.  Call your cardiologist on Monday for an appointment.  Return to the ER for any worsening symptoms, recurrent pain or pallor to the fingers, chest pain, shortness of breath, dizziness, or any other problems.

## 2020-01-18 NOTE — ED TRIAGE NOTES
Ambulates to triage  Chief Complaint   Patient presents with   • Numbness     bilateral hands on and off since yesterday     Stent placement x2 1/10/20, denies any CP or SOB, pt denies any numbness currently.

## 2020-01-18 NOTE — ED PROVIDER NOTES
ED Provider Note    CHIEF COMPLAINT  Chief Complaint   Patient presents with   • Numbness     bilateral hands on and off since yesterday       HPI  Conner Fox is a 59 y.o. male who presents with complaints of sudden onset of pallor to his hands and fingers for the past 2 days.  The patient says that he has had 2 episodes each time lasting for about 3 hours.  He said that about noon yesterday his finger suddenly became white, cold, and tingly.  He denied having any significant pain, denies any purplish color or cyanosis.  The patient was able to move his fingers, but said they felt numb and tingly.  This resolved after several hours.  He had a second episode occurred today again about noon time.  His symptoms are very similar, again his fingers became very pale and white and tingling.  He says the symptoms have since resolved.  He denies any similar episodes.  The patient was admitted to the hospital 7 days ago on January 10 with complaints of chest pain and shortness of breath.  He was found to have an a acute non-STEMI and was taken to the cardiac Cath Lab and had a initial stent placed to the left circumflex.  The patient was noted to be extremely hypertensive, and was placed on multiple blood pressure medications on discharge including carvedilol, lisinopril, losartan, and amlodipine along with Effient.  The patient denies having any further chest pain or shortness of breath.  He has had no fever, shaking chills, sore throat, cough, congestion, or any difficulty breathing.  He has had no nausea or vomiting.    REVIEW OF SYSTEMS  See HPI for further details. All other systems are negative.     PAST MEDICAL HISTORY  Past Medical History:   Diagnosis Date   • Hypertension        FAMILY HISTORY  History reviewed. No pertinent family history.    SOCIAL HISTORY  Social History     Socioeconomic History   • Marital status: Single     Spouse name: Not on file   • Number of children: Not on file   • Years of  education: Not on file   • Highest education level: Not on file   Occupational History   • Not on file   Social Needs   • Financial resource strain: Not on file   • Food insecurity:     Worry: Not on file     Inability: Not on file   • Transportation needs:     Medical: Not on file     Non-medical: Not on file   Tobacco Use   • Smoking status: Never Smoker   • Smokeless tobacco: Never Used   Substance and Sexual Activity   • Alcohol use: Not Currently   • Drug use: Never   • Sexual activity: Yes     Partners: Female   Lifestyle   • Physical activity:     Days per week: Not on file     Minutes per session: Not on file   • Stress: Not on file   Relationships   • Social connections:     Talks on phone: Not on file     Gets together: Not on file     Attends Cheondoism service: Not on file     Active member of club or organization: Not on file     Attends meetings of clubs or organizations: Not on file     Relationship status: Not on file   • Intimate partner violence:     Fear of current or ex partner: Not on file     Emotionally abused: Not on file     Physically abused: Not on file     Forced sexual activity: Not on file   Other Topics Concern   • Not on file   Social History Narrative   • Not on file       SURGICAL HISTORY  Past Surgical History:   Procedure Laterality Date   • OTHER CARDIAC SURGERY  01/10/2020    stent placement x2        CURRENT MEDICATIONS  Home Medications     Reviewed by Archie Hurtado (Pharmacy Tech) on 01/17/20 at 2139  Med List Status: Complete    Medication Last Dose Status    amLODIPine (NORVASC) 2.5 MG Tab 1/17/2020 Active    aspirin EC (ECOTRIN) 81 MG Tablet Delayed Response 1/17/2020 Active    atorvastatin (LIPITOR) 80 MG tablet 1/16/2020 Active    carvedilol (COREG) 25 MG Tab 1/17/2020 Active    lisinopril (PRINIVIL) 10 MG Tab 1/17/2020 Active    losartan (COZAAR) 50 MG Tab 1/17/2020 Active    prasugrel (EFFIENT) 10 MG Tab 1/17/2020 Active                ALLERGIES  Allergies  "  Allergen Reactions   • Levofloxacin Unspecified     GI Bleeding         PHYSICAL EXAM  VITAL SIGNS: Blood Pressure 151/79   Pulse 62   Temperature 36.9 °C (98.4 °F) (Temporal)   Respiration 18   Height 1.778 m (5' 10\")   Weight 100.6 kg (221 lb 12.5 oz)   Oxygen Saturation 97%   Body Mass Index 31.82 kg/m²   Constitutional: Awake, alert, in no acute distress, Non-toxic appearance.   HENT: Atraumatic. Bilateral external ears normal, mucous membranes moist, throat nonerythematous without exudates, nose is normal.  Eyes: PERRL, EOMI, conjunctiva moist, noninjected.  Neck: Nontender, Normal range of motion, No nuchal rigidity, No stridor.   Lymphatic: No lymphadenopathy noted.   Cardiovascular: Regular rate and rhythm, no murmurs, rubs, gallops.  Thorax & Lungs:  Good breath sounds bilaterally, no wheezes, rales, or retractions.  No chest tenderness.  Abdomen: Bowel sounds normal, Soft, nontender, nondistended, no rebound, guarding, masses.  Back: No CVA or spinal tenderness.  Extremities: Intact distal pulses, No edema, No tenderness.  There are good radial pulses noted bilaterally.  Fingers and toes are warm, with good sensation, and good capillary refill.  Skin: Warm, Dry, No rashes.   Musculoskeletal: No joint swelling or tenderness.  Neurologic: Alert & oriented x 3, sensory and motor function normal. No focal deficits.   Psychiatric: Affect normal, Judgment normal, Mood normal.     EKG  EKG #1, twelve-lead EKG shows normal sinus rhythm, rate of 62, normal intervals, left axis, there is a left anterior fascicular block, no acute ST wave elevations or ST depressions, no pathologic Q waves, no evidence of an acute injury or ischemic pattern on my reading, in comparison to previous EKG from January 10, 2020, there are no acute changes.    EKG #2: Twelve-lead EKG shows a normal sinus rhythm, rate of 62, normal intervals, normal axis, no acute ST wave elevations or ST depressions, no pathologic Q waves, no " evidence of an acute injury or ischemic pattern on my reading, in comparison to the previous EKG done earlier today there are no acute changes.        RADIOLOGY/PROCEDURES  US-EXTREMITY ARTERY UPPER BILAT   Final Result      CT-CHEST,ABDOMEN,PELVIS W/O   Final Result         1. A 4.5 cm ascending aortic aneurysm. No evidence of aneurysm rupture on this noncontrast study. No intramural hematoma. Evaluation for aortic dissection is limited without IV contrast.   2. Nonobstructing left nephrolithiasis.   3. Indeterminate 1.5 cm right lower pole renal lesion. It may represent a solid mass. Outpatient evaluation with contrast-enhanced renal protocol MRI is advised.   4. Fat stranding in the region of the right femoral vessels, likely related to prior vascular access. If there is clinical concern for DVT, consider ultrasound DVT study.      DX-CHEST-PORTABLE (1 VIEW)   Final Result      1.  No acute cardiopulmonary abnormality.   2.  Stable borderline cardiomegaly.            COURSE & MEDICAL DECISION MAKING  Pertinent Labs & Imaging studies reviewed. (See chart for details)  The patient presents with the above complaints.  He is currently asymptomatic, has good sensation, good cap refill to the fingers.  EKG shows a normal sinus rhythm, unchanged from previous.  Chest x-ray shows no acute cardiopulmonary abnormalities.  CT scan of the chest, abdomen, pelvis showed a 4.5 cm ascending aortic aneurysm, with no evidence of rupture or any intramural hematoma.  The aneurysm was seen on a previous CT scan does not show any significant changes.   CBC shows white count 10,700, hemoglobin 14.4, normal differential, chemistry shows normal electrolytes, BUN 45, creatinine 2.21, glucose 123, liver function test normal, troponin 3049, sed rate 33, C-reactive protein 4.83.  The patient's renal insufficiency is chronic.  His troponin is markedly elevated, but peaked at 4087 during his last admission.  I suspect this is just trending  downward, as he has had no further chest pain.  Arterial ultrasound of the upper extremities was ordered and there was a significant delay in obtaining the study.  It was finally obtained, the patient did not wish to stay any longer for the results.  Dr. Mcmillan of cardiology was consulted, and thought this may be Raynauds.  He has recommended increasing the patient's amlodipine from 2.5 to 5 mg daily.   He did not feel any further intervention was necessary and the patient is to follow-up with cardiology next week.    After the patient was discharged, the vascular ultrasound results were obtained and shows no significant arterial disease to the upper extremities.    FINAL IMPRESSION  1.  Vasospasm to the extremities  2.   3.         Electronically signed by: Favio Camilo M.D., 1/17/2020 11:35 PM

## 2020-01-18 NOTE — TELEPHONE ENCOUNTER
Called pt to discuss. Pt has been complaining of poor circulation with numbness and tingling to his fingers and toes. He states he has no cap refill and this started yesterday afternoon. He denies dizziness, light-headedness, CP/pressure, and SOB. Most recent /87 earlier this afternoon. He has had no changes in diet or medication regimen. He is worried his medications have caused this issue. Advised pt to go to urgent care and ER to be evaluated. RITA with JALEESA scheduled for 01/23/20.

## 2020-01-23 ENCOUNTER — OFFICE VISIT (OUTPATIENT)
Dept: CARDIOLOGY | Facility: MEDICAL CENTER | Age: 60
End: 2020-01-23
Payer: COMMERCIAL

## 2020-01-23 ENCOUNTER — NON-PROVIDER VISIT (OUTPATIENT)
Dept: CARDIOLOGY | Facility: MEDICAL CENTER | Age: 60
End: 2020-01-23

## 2020-01-23 VITALS
HEIGHT: 70 IN | BODY MASS INDEX: 30.92 KG/M2 | WEIGHT: 216 LBS | HEART RATE: 58 BPM | OXYGEN SATURATION: 96 % | DIASTOLIC BLOOD PRESSURE: 82 MMHG | RESPIRATION RATE: 16 BRPM | SYSTOLIC BLOOD PRESSURE: 122 MMHG

## 2020-01-23 DIAGNOSIS — Z02.9 ENCOUNTERS FOR ADMINISTRATIVE PURPOSE: ICD-10-CM

## 2020-01-23 DIAGNOSIS — I21.4 NSTEMI (NON-ST ELEVATED MYOCARDIAL INFARCTION) (HCC): ICD-10-CM

## 2020-01-23 DIAGNOSIS — N17.9 AKI (ACUTE KIDNEY INJURY) (HCC): ICD-10-CM

## 2020-01-23 DIAGNOSIS — I25.110 CORONARY ARTERY DISEASE INVOLVING NATIVE CORONARY ARTERY OF NATIVE HEART WITH UNSTABLE ANGINA PECTORIS (HCC): ICD-10-CM

## 2020-01-23 DIAGNOSIS — I25.5 ISCHEMIC CARDIOMYOPATHY: ICD-10-CM

## 2020-01-23 DIAGNOSIS — I77.810 ASCENDING AORTA DILATATION (HCC): ICD-10-CM

## 2020-01-23 PROBLEM — I23.7: Status: RESOLVED | Noted: 2020-01-13 | Resolved: 2020-01-23

## 2020-01-23 PROCEDURE — 99214 OFFICE O/P EST MOD 30 MIN: CPT | Performed by: NURSE PRACTITIONER

## 2020-01-23 RX ORDER — LOSARTAN POTASSIUM 25 MG/1
25 TABLET ORAL DAILY
Qty: 30 TAB | Refills: 11 | Status: SHIPPED | OUTPATIENT
Start: 2020-01-23 | End: 2020-01-27

## 2020-01-23 RX ORDER — ISOSORBIDE MONONITRATE 30 MG/1
30 TABLET, EXTENDED RELEASE ORAL EVERY MORNING
COMMUNITY
End: 2020-01-23

## 2020-01-23 RX ORDER — ISOSORBIDE MONONITRATE 30 MG/1
30 TABLET, EXTENDED RELEASE ORAL EVERY MORNING
Qty: 30 TAB | Refills: 11 | Status: SHIPPED | OUTPATIENT
Start: 2020-01-23 | End: 2020-02-06

## 2020-01-23 ASSESSMENT — ENCOUNTER SYMPTOMS
ORTHOPNEA: 0
WEAKNESS: 0
PND: 0
DIZZINESS: 0
CLAUDICATION: 0
PALPITATIONS: 0
SHORTNESS OF BREATH: 0
WEIGHT LOSS: 0

## 2020-01-23 NOTE — PROGRESS NOTES
Chief Complaint   Patient presents with   • Hospital Follow-up       Subjective:   Conner Fox is a 59 y.o. male who presents today for hospital follow-up.    Patient was admitted on 1/10/2020 with non-ST elevation MI.  Patient underwent coronary angiogram on 1/1/2020 which revealed patent mid left circumflex stent with 95% stenosis distal to the stent, 80-90% mid LAD with total occlusion of the distal LAD and 80% occlusion to the mid ramus.  Patient underwent successful PCI of the left circumflex with a drug-eluting stent with plans for staged PCI of the LAD in the near future once patient's renal function improved.  An echocardiogram was completed which revealed a reduced LVEF of 40%.  Patient was discharged on 1/14/2020.    Past medical history significant for hypertension.    Today patient states that he is feeling better overall. Denies chest pain, palpitations, edema, shortness of breath or dizziness/syncope. Tolerating DAPT with no issue.    Past Medical History:   Diagnosis Date   • Hypertension      Past Surgical History:   Procedure Laterality Date   • OTHER CARDIAC SURGERY  01/10/2020    stent placement x2      History reviewed. No pertinent family history.  Social History     Socioeconomic History   • Marital status: Single     Spouse name: Not on file   • Number of children: Not on file   • Years of education: Not on file   • Highest education level: Not on file   Occupational History   • Not on file   Social Needs   • Financial resource strain: Not on file   • Food insecurity:     Worry: Not on file     Inability: Not on file   • Transportation needs:     Medical: Not on file     Non-medical: Not on file   Tobacco Use   • Smoking status: Never Smoker   • Smokeless tobacco: Never Used   Substance and Sexual Activity   • Alcohol use: Not Currently   • Drug use: Never   • Sexual activity: Yes     Partners: Female   Lifestyle   • Physical activity:     Days per week: Not on file     Minutes per  session: Not on file   • Stress: Not on file   Relationships   • Social connections:     Talks on phone: Not on file     Gets together: Not on file     Attends Alevism service: Not on file     Active member of club or organization: Not on file     Attends meetings of clubs or organizations: Not on file     Relationship status: Not on file   • Intimate partner violence:     Fear of current or ex partner: Not on file     Emotionally abused: Not on file     Physically abused: Not on file     Forced sexual activity: Not on file   Other Topics Concern   • Not on file   Social History Narrative   • Not on file     Allergies   Allergen Reactions   • Levofloxacin Unspecified     GI Bleeding       Outpatient Encounter Medications as of 1/23/2020   Medication Sig Dispense Refill   • losartan (COZAAR) 25 MG Tab Take 1 Tab by mouth every day. 30 Tab 11   • isosorbide mononitrate SR (IMDUR) 30 MG TABLET SR 24 HR Take 1 Tab by mouth every morning. 30 Tab 11   • amLODIPine (NORVASC) 2.5 MG Tab Take 2.5 mg by mouth every day.     • aspirin EC (ECOTRIN) 81 MG Tablet Delayed Response Take 81 mg by mouth every day.     • atorvastatin (LIPITOR) 80 MG tablet Take 80 mg by mouth every evening.     • carvedilol (COREG) 25 MG Tab Take 25 mg by mouth 2 times a day, with meals.     • prasugrel (EFFIENT) 10 MG Tab Take 10 mg by mouth every day.     • [DISCONTINUED] isosorbide mononitrate SR (IMDUR) 30 MG TABLET SR 24 HR Take 30 mg by mouth every morning.     • [DISCONTINUED] lisinopril (PRINIVIL) 10 MG Tab Take 10 mg by mouth every day.     • [DISCONTINUED] losartan (COZAAR) 50 MG Tab Take 50 mg by mouth every day.     • [DISCONTINUED] amLODIPine (NORVASC) 2.5 MG Tab Take 1 Tab by mouth every day. 30 Tab 2   • [DISCONTINUED] aspirin EC 81 MG EC tablet Take 1 Tab by mouth every day. 30 Tab 11   • [DISCONTINUED] atorvastatin (LIPITOR) 80 MG tablet Take 1 Tab by mouth every evening. 30 Tab 2   • [DISCONTINUED] carvedilol (COREG) 25 MG Tab Take  "1 Tab by mouth 2 times a day, with meals. 60 Tab 2   • [DISCONTINUED] losartan (COZAAR) 50 MG Tab Take 1 Tab by mouth every day. 30 Tab 2   • [DISCONTINUED] prasugrel (EFFIENT) 10 MG Tab Take 1 Tab by mouth every day. 30 Tab 11   • [DISCONTINUED] cyclobenzaprine (FLEXERIL) 10 MG Tab Take 10 mg by mouth 3 times a day as needed for Moderate Pain.     • [DISCONTINUED] Fluticasone-Salmeterol (ADVAIR HFA INH) Inhale 1 Puff by mouth Once.       No facility-administered encounter medications on file as of 1/23/2020.      Review of Systems   Constitutional: Negative for malaise/fatigue and weight loss.   Respiratory: Negative for shortness of breath.    Cardiovascular: Negative for chest pain, palpitations, orthopnea, claudication, leg swelling and PND.   Neurological: Negative for dizziness and weakness.   All other systems reviewed and are negative.       Objective:   /82 (BP Location: Left arm, Patient Position: Sitting, BP Cuff Size: Adult)   Pulse (!) 58   Resp 16   Ht 1.778 m (5' 10\")   Wt 98 kg (216 lb)   SpO2 96%   BMI 30.99 kg/m²     Physical Exam   Constitutional: He is oriented to person, place, and time. He appears well-developed and well-nourished. No distress.   HENT:   Head: Normocephalic.   Eyes: EOM are normal.   Neck: No JVD present.   Cardiovascular: Normal rate and regular rhythm.   Pulmonary/Chest: Effort normal and breath sounds normal.   Abdominal: Soft. There is no tenderness.   Musculoskeletal:         General: No edema.   Neurological: He is alert and oriented to person, place, and time.   Skin: Skin is warm and dry.   Psychiatric: He has a normal mood and affect. His behavior is normal.        Coronary Angiogram 1/10/2020:  Conclusions  1. 100% thrombotically occluded LCx culprit.  2. 80% focal mid ramus stenosis.  3. 80% focal mid LAD stenosis.  4. No ascending arch or thoracic descending aortic dissection.  5. LVEF 50% prior to intervention.  6. Successful PCI culprit LCx (3.0 x 30 " mm [place] GARRY), good result withresidual distal stent thrombus but SHERRI-3 flow.     Recommendations  * Continue medical management and risk factor modification.  * Effient for at least one year and Aspirin indefinitely.  * Staged PCI LAD and ramus and reevaluation of distal LCx within 6 to 12 weeks.     Coronary Angiogram 1/11/2020:  Pre-operative Diagnosis:  Status post stenting of the mid left circumflex artery for non-STEMI with recurrent angina     Post-operative Diagnosis:   1. Patent previously placed stent in the mid left circumflex artery with 95% stenosis distal to the stent 2.  Status post stenting of the distal left circumflex artery with 2.5 x 18 mm [place] drug eluting stent  3.  80% mid left anterior descending artery (LAD) stenosis with total occlusion of distal LAD  4.  70% proximal stenosis and 80% mid stenosis of the first diagonal branch of LAD     Renal US 1/11/2020:  1.  Bilateral nephrolithiasis, no hydronephrosis or obstructive changes are appreciated.  2.  Left upper pole renal cyst.     TTE 1/10/2020:  CONCLUSIONS  Moderately reduced left ventricular systolic function.  Left ventricular ejection fraction is visually estimated to be 40%.  Moderate concentric left ventricular hypertrophy.  Akinesis lateral wall.  Hypokinesis anterior wall.  Mild mitral regurgitation.  Aortic sclerosis without stenosis.  Mild aortic insufficiency.  Right heart pressures are consistent with moderate pulmonary   hypertension.  Ascending aorta is dilated with a diameter of 4.4 cm.  No prior study is available for comparison.     CT Chest, Abdomen and Pelvis 1/17/2020:   IMPRESSION:  1. A 4.5 cm ascending aortic aneurysm. No evidence of aneurysm rupture on this noncontrast study. No intramural hematoma. Evaluation for aortic dissection is limited without IV contrast.  2. Nonobstructing left nephrolithiasis.  3. Indeterminate 1.5 cm right lower pole renal lesion. It may represent a solid mass. Outpatient evaluation with  contrast-enhanced renal protocol MRI is advised.  4. Fat stranding in the region of the right femoral vessels, likely related to prior vascular access. If there is clinical concern for DVT, consider ultrasound DVT study.    Assessment:     1. CRISELDA (acute kidney injury) ([facility])  Basic Metabolic Panel    REFERRAL TO NEPHROLOGY   2. NSTEMI (non-ST elevated myocardial infarction) ([facility])     3. Coronary artery disease involving native coronary artery of native heart with unstable angina pectoris ([facility])     4. Ischemic cardiomyopathy     5. Ascending aorta dilatation ([facility])         Medical Decision Making:  Today's Assessment / Status / Plan:     CAD S/P NSTEMI:  -LHC on 1/11/2020 showed patent previously placed LCx stent (placed on 1/10/2020), underwent PCI with GARRY to LCx distal to previous stent with residual stenosis of the LAD and first diagonal branch.   -Plan is for staged PCI of the LAD and ramus once renal function stabilizes.   -Denies angina.   -LDL on 1/11/2020 was 66, at goal of <70.  -DAPT (ASA 81 mg daily and Effient 10 mg daily) for minimum of one year after staged PCI is completed.   -Continue Atorvastatin 80 mg daily and Coreg 25 mg BID.   -Losartan reduced to 12.5 mg daily due to rising Cr.    Ischemic CM:  -Appears euvolemic   -Continue BB/ARB as above.  -Patient was also on Lisinopril??? Now d/c'd.  -Start Imdur for afterload reduction.   -No Aldactone due to renal function.     Ascending Aortic Dilation:   -4.5 cm per CT on 1/17/2020.    CRISELDA:  -Last chem panel showed worsening renal function, Cr 2.  -Reduced ARB as above.  -Lisinopril stopped.  -Renal US did show bilateral kidney stones with left renal cyst.  -Renal MRI ordered by PCP per patient.    -Repeat BMP in 2 weeks and refer to neph.     Patient will follow up with myself in 2 weeks or earlier if needed. Encouraged patient to contact office should any questions or concerns arise in the mean time. Patient understands and agrees with the plan of  care.       Future Appointments   Date Time Provider Department Center   2/6/2020 10:30 AM [facility] None     Collaborating Provider: Dr. David Newell       Please note that this dictation was created using voice recognition software. I have made every reasonable attempt to correct obvious errors, but I expect that there are errors of grammar and possibly content I did not discover before finalizing the note.

## 2020-01-27 ENCOUNTER — TELEPHONE (OUTPATIENT)
Dept: CARDIOLOGY | Facility: MEDICAL CENTER | Age: 60
End: 2020-01-27

## 2020-01-27 DIAGNOSIS — I21.4 NSTEMI (NON-ST ELEVATED MYOCARDIAL INFARCTION) (HCC): ICD-10-CM

## 2020-01-27 DIAGNOSIS — N28.9 RENAL INSUFFICIENCY: ICD-10-CM

## 2020-01-27 DIAGNOSIS — I25.110 CORONARY ARTERY DISEASE INVOLVING NATIVE CORONARY ARTERY OF NATIVE HEART WITH UNSTABLE ANGINA PECTORIS (HCC): ICD-10-CM

## 2020-01-27 RX ORDER — LOSARTAN POTASSIUM 25 MG/1
12.5 TABLET ORAL DAILY
Qty: 30 TAB | Refills: 11 | COMMUNITY
Start: 2020-01-27 | End: 2020-04-17

## 2020-01-27 NOTE — TELEPHONE ENCOUNTER
Conner Fox, Abeba PRIDE, SARATHRMindiN. 2 days ago         Good morning Abeba, I had thought I was supposed to reduce my intake of CARVEDILOL to 12.5 per day from 50mg i.e. 25mg bid. But do not see that change reflected in my new medications list given me at my last visit. Please advise.     Stacey,   Conner Fox          Reviewed last OV note from JS. Pt's Losartan decreased to 12.5mg daily. Called pt to discuss his medications. Advised no changes to his carvedilol dose and losartan has been decreased to 12.5mg daily. He will start cutting these tablets in half. Pt's BP today 120's/80's, no symptoms reported. Verbalized understanding of medications and appreciative of call.

## 2020-01-27 NOTE — TELEPHONE ENCOUNTER
JS    Pt is following up on status of My Chart message sent over the weekend. Pt having b/p issues, low b/p 115/70 on Saturday and spoke to on call doctor. Please call pt for further details at [phone]

## 2020-01-28 ENCOUNTER — TELEPHONE (OUTPATIENT)
Dept: CARDIOLOGY | Facility: MEDICAL CENTER | Age: 60
End: 2020-01-28

## 2020-01-29 NOTE — TELEPHONE ENCOUNTER
[facility] paperwork reviewed and signed by JALEESA. Faxed to pt's benefits department at [phone], received receipt for confirmation. Called pt to notify. He will  paperwork tomorrow. Paperwork given to RUBI Kinney

## 2020-02-04 ENCOUNTER — HOSPITAL ENCOUNTER (OUTPATIENT)
Dept: LAB | Facility: MEDICAL CENTER | Age: 60
End: 2020-02-04
Attending: NURSE PRACTITIONER
Payer: COMMERCIAL

## 2020-02-04 DIAGNOSIS — N17.9 AKI (ACUTE KIDNEY INJURY) (HCC): ICD-10-CM

## 2020-02-04 LAB
ANION GAP SERPL CALC-SCNC: 7 MMOL/L (ref 0–11.9)
BUN SERPL-MCNC: 26 MG/DL (ref 8–22)
CALCIUM SERPL-MCNC: 10.1 MG/DL (ref 8.5–10.5)
CHLORIDE SERPL-SCNC: 103 MMOL/L (ref 96–112)
CO2 SERPL-SCNC: 25 MMOL/L (ref 20–33)
CREAT SERPL-MCNC: 1.77 MG/DL (ref 0.5–1.4)
GLUCOSE SERPL-MCNC: 102 MG/DL (ref 65–99)
POTASSIUM SERPL-SCNC: 4.3 MMOL/L (ref 3.6–5.5)
SODIUM SERPL-SCNC: 135 MMOL/L (ref 135–145)

## 2020-02-04 PROCEDURE — 36415 COLL VENOUS BLD VENIPUNCTURE: CPT

## 2020-02-04 PROCEDURE — 80048 BASIC METABOLIC PNL TOTAL CA: CPT

## 2020-02-04 NOTE — PROGRESS NOTES
Chief Complaint   Patient presents with   • HTN (Controlled)     FV       Subjective:   Conner Fox is a 59 y.o. male who presents today for hospital follow-up.    Patient was last seen by myself on 1/23/2020 for hospital follow up after being admitted on 1/10/2020 with non-ST elevation MI.  Patient underwent coronary angiogram on 1/1/2020 which revealed patent mid left circumflex stent with 95% stenosis distal to the stent, 80-90% mid LAD with total occlusion of the distal LAD and 80% occlusion to the mid ramus.  Patient underwent successful PCI of the left circumflex with a drug-eluting stent with plans for staged PCI of the LAD in the near future once patient's renal function improved.  An echocardiogram was completed which revealed a reduced LVEF of 40%.  Patient was discharged on 1/14/2020.    During his last visit his creatinine continued to increase.  It was discovered that patient was taking both lisinopril and losartan daily.  He was referred to nephrology.  His lisinopril was discontinued and a repeat BMP was ordered.    Past medical history significant for hypertension.    Today patient states that he was not able to tolerate the IMDUR as it may him feel terrible, lightheaded.  Since he stopped taking the IMDUR he has been feeling well overall.  Some positional lightheadedness which resolves within a few seconds.  Denies chest pain, palpitations, shortness of breath, edema, dizziness or syncope.    Past Medical History:   Diagnosis Date   • Hypertension      Past Surgical History:   Procedure Laterality Date   • OTHER CARDIAC SURGERY  01/10/2020    stent placement x2      History reviewed. No pertinent family history.  Social History     Socioeconomic History   • Marital status: Single     Spouse name: Not on file   • Number of children: Not on file   • Years of education: Not on file   • Highest education level: Not on file   Occupational History   • Not on file   Social Needs   • Financial  resource strain: Not on file   • Food insecurity:     Worry: Not on file     Inability: Not on file   • Transportation needs:     Medical: Not on file     Non-medical: Not on file   Tobacco Use   • Smoking status: Never Smoker   • Smokeless tobacco: Never Used   Substance and Sexual Activity   • Alcohol use: Not Currently   • Drug use: Never   • Sexual activity: Yes     Partners: Female   Lifestyle   • Physical activity:     Days per week: Not on file     Minutes per session: Not on file   • Stress: Not on file   Relationships   • Social connections:     Talks on phone: Not on file     Gets together: Not on file     Attends Denominational service: Not on file     Active member of club or organization: Not on file     Attends meetings of clubs or organizations: Not on file     Relationship status: Not on file   • Intimate partner violence:     Fear of current or ex partner: Not on file     Emotionally abused: Not on file     Physically abused: Not on file     Forced sexual activity: Not on file   Other Topics Concern   • Not on file   Social History Narrative   • Not on file     Allergies   Allergen Reactions   • Levofloxacin Unspecified     GI Bleeding       Outpatient Encounter Medications as of 2/6/2020   Medication Sig Dispense Refill   • losartan (COZAAR) 25 MG Tab Take 0.5 Tabs by mouth every day. 30 Tab 11   • aspirin EC (ECOTRIN) 81 MG Tablet Delayed Response Take 81 mg by mouth every day.     • atorvastatin (LIPITOR) 80 MG tablet Take 80 mg by mouth every evening.     • carvedilol (COREG) 25 MG Tab Take 25 mg by mouth 2 times a day, with meals.     • prasugrel (EFFIENT) 10 MG Tab Take 10 mg by mouth every day.     • [DISCONTINUED] isosorbide mononitrate SR (IMDUR) 30 MG TABLET SR 24 HR Take 1 Tab by mouth every morning. (Patient not taking: Reported on 2/6/2020) 30 Tab 11   • [DISCONTINUED] amLODIPine (NORVASC) 2.5 MG Tab Take 2.5 mg by mouth every day.       No facility-administered encounter medications on file  "as of 2/6/2020.      Review of Systems   Constitutional: Positive for malaise/fatigue. Negative for weight loss.   Respiratory: Negative for shortness of breath.    Cardiovascular: Negative for chest pain, palpitations, orthopnea, claudication, leg swelling and PND.   Neurological: Negative for dizziness and weakness.   All other systems reviewed and are negative.       Objective:   /68 (BP Location: Left arm, Patient Position: Sitting, BP Cuff Size: Adult)   Pulse (!) 58   Ht 1.778 m (5' 10\")   Wt 99.8 kg (220 lb)   SpO2 98%   BMI 31.57 kg/m²     Physical Exam   Constitutional: He is oriented to person, place, and time. He appears well-developed and well-nourished. No distress.   HENT:   Head: Normocephalic.   Eyes: EOM are normal.   Neck: No JVD present.   Cardiovascular: Normal rate and regular rhythm.   Pulmonary/Chest: Effort normal and breath sounds normal. No respiratory distress.   Abdominal: Soft. There is no tenderness.   Musculoskeletal:         General: No edema.   Neurological: He is alert and oriented to person, place, and time.   Skin: Skin is warm and dry.   Psychiatric: He has a normal mood and affect. His behavior is normal.        Coronary Angiogram 1/10/2020:  Conclusions  1. 100% thrombotically occluded LCx culprit.  2. 80% focal mid ramus stenosis.  3. 80% focal mid LAD stenosis.  4. No ascending arch or thoracic descending aortic dissection.  5. LVEF 50% prior to intervention.  6. Successful PCI culprit LCx (3.0 x 30 mm [place] GARRY), good result withresidual distal stent thrombus but SHERRI-3 flow.     Recommendations  * Continue medical management and risk factor modification.  * Effient for at least one year and Aspirin indefinitely.  * Staged PCI LAD and ramus and reevaluation of distal LCx within 6 to 12 weeks.     Coronary Angiogram 1/11/2020:  Pre-operative Diagnosis:  Status post stenting of the mid left circumflex artery for non-STEMI with recurrent angina     Post-operative " Diagnosis:   1. Patent previously placed stent in the mid left circumflex artery with 95% stenosis distal to the stent 2.  Status post stenting of the distal left circumflex artery with 2.5 x 18 mm [place] drug eluting stent  3.  80% mid left anterior descending artery (LAD) stenosis with total occlusion of distal LAD  4.  70% proximal stenosis and 80% mid stenosis of the first diagonal branch of LAD     Renal US 1/11/2020:  1.  Bilateral nephrolithiasis, no hydronephrosis or obstructive changes are appreciated.  2.  Left upper pole renal cyst.     TTE 1/10/2020:  CONCLUSIONS  Moderately reduced left ventricular systolic function.  Left ventricular ejection fraction is visually estimated to be 40%.  Moderate concentric left ventricular hypertrophy.  Akinesis lateral wall.  Hypokinesis anterior wall.  Mild mitral regurgitation.  Aortic sclerosis without stenosis.  Mild aortic insufficiency.  Right heart pressures are consistent with moderate pulmonary   hypertension.  Ascending aorta is dilated with a diameter of 4.4 cm.  No prior study is available for comparison.     CT Chest, Abdomen and Pelvis 1/17/2020:   IMPRESSION:  1. A 4.5 cm ascending aortic aneurysm. No evidence of aneurysm rupture on this noncontrast study. No intramural hematoma. Evaluation for aortic dissection is limited without IV contrast.  2. Nonobstructing left nephrolithiasis.  3. Indeterminate 1.5 cm right lower pole renal lesion. It may represent a solid mass. Outpatient evaluation with contrast-enhanced renal protocol MRI is advised.  4. Fat stranding in the region of the right femoral vessels, likely related to prior vascular access. If there is clinical concern for DVT, consider ultrasound DVT study.    Assessment:     1. Coronary artery disease involving native coronary artery of native heart with unstable angina pectoris (HCC)     2. Ischemic cardiomyopathy     3. Essential hypertension, benign     4. Ascending aorta dilatation (HCC)     5.  CRISELDA (acute kidney injury) (Formerly [facility])         Medical Decision Making:  Today's Assessment / Status / Plan:     CAD S/P NSTEMI:  -LHC on 1/11/2020 showed patent previously placed LCx stent (placed on 1/10/2020), underwent subsequent PCI on 1/11/20 to LCx distal to previous stent.  -Plan is for staged PCI of the LAD and ramus once renal function stabilizes.   -Continues to deny angina or dyspnea.  -LDL on 1/11/2020 was 66, at goal of <70.  -DAPT (ASA 81 mg daily and Effient 10 mg daily) for minimum of one year after staged PCI is completed.   -Continue Atorvastatin 80 mg daily, losartan 12.5 mg daily and Coreg 25 mg BID.   -Requesting clearance to proceed with staged PCI. If cleared, will have patient scheduled within the next few weeks.       Ischemic CM (40%):  -Continues to appear euvolemic.  -Continue BB/ARB as above.  -No Aldactone due to renal function.   -Reassess LVEF 2 to 3 months after staged PCI is completed.    Hypertension:  -BP borderline here today.  -Discontinue amlodipine 2.5 mg daily.  -Continue Coreg 25 mg twice daily and losartan 12.5 mg daily.    Ascending Aortic Dilation:   -4.5 cm per CT on 1/17/2020.    CRISELDA:  -Creatinine improved with discontinuation of lisinopril and decreased dosing of losartan.  -Cr 1.86-->1.8-->1.87-->2.21-->1.77.  -Has an appointment with nephrology on 2/11/20, appreciate recommendations.     Patient will follow up with myself in 4 weeks or earlier if needed. Encouraged patient to contact office should any questions or concerns arise in the mean time. Patient understands and agrees with the plan of care.     Future Appointments   Date Time Provider Department Center   2/11/2020  3:00 PM Fadi Najjar, M.D. [facility] [address] [place] [address]   3/12/2020 10:30 AM ALEXA Saba. [facility] None     Collaborating Provider: Dr. Kvng Melgar        Please note that this dictation was created using voice recognition software. I have made every reasonable attempt to correct obvious errors, but I  expect that there are errors of grammar and possibly content I did not discover before finalizing the note.

## 2020-02-06 ENCOUNTER — OFFICE VISIT (OUTPATIENT)
Dept: CARDIOLOGY | Facility: MEDICAL CENTER | Age: 60
End: 2020-02-06
Payer: COMMERCIAL

## 2020-02-06 VITALS
HEIGHT: 70 IN | DIASTOLIC BLOOD PRESSURE: 68 MMHG | BODY MASS INDEX: 31.5 KG/M2 | HEART RATE: 58 BPM | WEIGHT: 220 LBS | SYSTOLIC BLOOD PRESSURE: 102 MMHG | OXYGEN SATURATION: 98 %

## 2020-02-06 DIAGNOSIS — I77.810 ASCENDING AORTA DILATATION (HCC): ICD-10-CM

## 2020-02-06 DIAGNOSIS — I25.5 ISCHEMIC CARDIOMYOPATHY: ICD-10-CM

## 2020-02-06 DIAGNOSIS — N17.9 AKI (ACUTE KIDNEY INJURY) (HCC): ICD-10-CM

## 2020-02-06 DIAGNOSIS — I25.110 CORONARY ARTERY DISEASE INVOLVING NATIVE CORONARY ARTERY OF NATIVE HEART WITH UNSTABLE ANGINA PECTORIS (HCC): ICD-10-CM

## 2020-02-06 DIAGNOSIS — I10 ESSENTIAL HYPERTENSION, BENIGN: ICD-10-CM

## 2020-02-06 PROBLEM — I16.0 HYPERTENSIVE URGENCY: Status: RESOLVED | Noted: 2020-01-10 | Resolved: 2020-02-06

## 2020-02-06 PROCEDURE — 99214 OFFICE O/P EST MOD 30 MIN: CPT | Performed by: NURSE PRACTITIONER

## 2020-02-06 ASSESSMENT — ENCOUNTER SYMPTOMS
ORTHOPNEA: 0
WEIGHT LOSS: 0
DIZZINESS: 0
CLAUDICATION: 0
PALPITATIONS: 0
SHORTNESS OF BREATH: 0
PND: 0
WEAKNESS: 0

## 2020-02-11 ENCOUNTER — TELEPHONE (OUTPATIENT)
Dept: CARDIOLOGY | Facility: MEDICAL CENTER | Age: 60
End: 2020-02-11

## 2020-02-11 ENCOUNTER — OFFICE VISIT (OUTPATIENT)
Dept: NEPHROLOGY | Facility: MEDICAL CENTER | Age: 60
End: 2020-02-11
Payer: COMMERCIAL

## 2020-02-11 VITALS
BODY MASS INDEX: 31.5 KG/M2 | DIASTOLIC BLOOD PRESSURE: 68 MMHG | HEART RATE: 68 BPM | OXYGEN SATURATION: 95 % | HEIGHT: 70 IN | TEMPERATURE: 98.4 F | SYSTOLIC BLOOD PRESSURE: 128 MMHG | WEIGHT: 220 LBS | RESPIRATION RATE: 14 BRPM

## 2020-02-11 DIAGNOSIS — N32.0 BLADDER OUTFLOW OBSTRUCTION: ICD-10-CM

## 2020-02-11 DIAGNOSIS — N17.9 AKI (ACUTE KIDNEY INJURY) (HCC): ICD-10-CM

## 2020-02-11 DIAGNOSIS — I10 ESSENTIAL HYPERTENSION: ICD-10-CM

## 2020-02-11 PROCEDURE — 99204 OFFICE O/P NEW MOD 45 MIN: CPT | Performed by: INTERNAL MEDICINE

## 2020-02-11 ASSESSMENT — ENCOUNTER SYMPTOMS
FEVER: 0
NAUSEA: 0
HYPERTENSION: 1
SHORTNESS OF BREATH: 0
COUGH: 0
VOMITING: 0
NERVOUS/ANXIOUS: 1
CHILLS: 0

## 2020-02-12 ENCOUNTER — TELEPHONE (OUTPATIENT)
Dept: NEPHROLOGY | Facility: MEDICAL CENTER | Age: 60
End: 2020-02-12

## 2020-02-12 NOTE — TELEPHONE ENCOUNTER
JS      Patient is calling to let you know how he is feeling. He said he's had chest discomfort the last two nights. He said on a scale of 1 to 10, it's a 1. He also wants to discuss his prescriptions, he said they still haven't been filled. He can be reached at [phone].

## 2020-02-12 NOTE — PROGRESS NOTES
Subjective:      Conner Fox is a 59 y.o. male who presents with Chronic Kidney Disease and Hypertension            Patient is an unfortunate 59-year-old gentleman with a past medical history significant for longstanding hypertension, recent hospitalization with acute coronary syndrome, underwent to angiogram/angioplasty.  Was diagnosed with renal failure, unfortunately we do not have any baseline creatinine level.  Patient does have bladder outlet obstruction symptoms.  Patient has been evaluated for potential another angiogram/angioplasty.  Patient also found to have elevated hemoglobin A1c    Chronic Kidney Disease   This is a new problem. The current episode started more than 1 month ago. The problem occurs constantly. The problem has been waxing and waning. Associated symptoms include urinary symptoms. Pertinent negatives include no chest pain, chills, coughing, fever, nausea or vomiting.   Hypertension   This is a chronic problem. The current episode started more than 1 year ago. The problem is unchanged. The problem is controlled. Pertinent negatives include no chest pain, malaise/fatigue, peripheral edema or shortness of breath. Risk factors for coronary artery disease include male gender and dyslipidemia. Past treatments include angiotensin blockers and beta blockers. The current treatment provides significant improvement. There are no compliance problems.  Hypertensive end-organ damage includes kidney disease. Identifiable causes of hypertension include chronic renal disease.       Review of Systems   Constitutional: Negative for chills, fever and malaise/fatigue.   Respiratory: Negative for cough and shortness of breath.    Cardiovascular: Negative for chest pain and leg swelling.   Gastrointestinal: Negative for nausea and vomiting.   Genitourinary: Negative for dysuria, frequency and urgency.   Psychiatric/Behavioral: The patient is nervous/anxious.    All other systems reviewed and are  "negative.         Objective:     /68 (BP Location: Right arm, Patient Position: Sitting)   Pulse 68   Temp 36.9 °C (98.4 °F)   Resp 14   Ht 1.778 m (5' 10\")   Wt 99.8 kg (220 lb)   SpO2 95%   BMI 31.57 kg/m²      Physical Exam  Vitals signs and nursing note reviewed.   Constitutional:       General: He is awake.      Appearance: He is not ill-appearing.   HENT:      Head: Normocephalic and atraumatic.      Right Ear: External ear normal.      Left Ear: External ear normal.      Nose: Nose normal.      Mouth/Throat:      Pharynx: No oropharyngeal exudate or posterior oropharyngeal erythema.   Eyes:      General:         Right eye: No discharge.         Left eye: No discharge.      Conjunctiva/sclera: Conjunctivae normal.   Neck:      Musculoskeletal: No neck rigidity or muscular tenderness.   Cardiovascular:      Rate and Rhythm: Normal rate and regular rhythm.   Pulmonary:      Effort: Pulmonary effort is normal. No respiratory distress.      Breath sounds: Normal breath sounds. No wheezing.      Comments: Coarse BS  Abdominal:      General: Abdomen is flat. Bowel sounds are normal.   Musculoskeletal:         General: No swelling.      Right lower leg: No edema.   Lymphadenopathy:      Cervical: No cervical adenopathy.   Skin:     General: Skin is warm and dry.      Coloration: Skin is not jaundiced.   Neurological:      General: No focal deficit present.      Mental Status: He is alert and oriented to person, place, and time. Mental status is at baseline.   Psychiatric:         Mood and Affect: Mood is anxious and depressed.         Behavior: Behavior normal.       Past Medical History:   Diagnosis Date   • Hypertension      Social History     Socioeconomic History   • Marital status: Single     Spouse name: Not on file   • Number of children: Not on file   • Years of education: Not on file   • Highest education level: Not on file   Occupational History   • Not on file   Social Needs   • Financial " resource strain: Not on file   • Food insecurity:     Worry: Not on file     Inability: Not on file   • Transportation needs:     Medical: Not on file     Non-medical: Not on file   Tobacco Use   • Smoking status: Never Smoker   • Smokeless tobacco: Never Used   Substance and Sexual Activity   • Alcohol use: Not Currently   • Drug use: Never   • Sexual activity: Yes     Partners: Female   Lifestyle   • Physical activity:     Days per week: Not on file     Minutes per session: Not on file   • Stress: Not on file   Relationships   • Social connections:     Talks on phone: Not on file     Gets together: Not on file     Attends Druze service: Not on file     Active member of club or organization: Not on file     Attends meetings of clubs or organizations: Not on file     Relationship status: Not on file   • Intimate partner violence:     Fear of current or ex partner: Not on file     Emotionally abused: Not on file     Physically abused: Not on file     Forced sexual activity: Not on file   Other Topics Concern   • Not on file   Social History Narrative   • Not on file     History reviewed. No pertinent family history.  Recent Labs     01/10/20  1320 01/11/20  0130 01/12/20  0350  01/13/20  0235 01/14/20  0440 01/17/20  1915 02/04/20  1157   ALBUMIN 3.8 3.4  --   --   --   --  3.9  --    HDL  --  55  --   --   --   --   --   --    TRIGLYCERIDE  --  76  --   --   --   --   --   --    SODIUM 134* 133* 135   < > 135 134* 132* 135   POTASSIUM 3.3* 3.3* 3.4*   < > 3.8 4.1 4.6 4.3   CHLORIDE 100 100 102   < > 101 102 100 103   CO2 21 23 22   < > 20 22 24 25   BUN 22 25* 35*   < > 34* 34* 45* 26*   CREATININE 1.56* 1.85* 1.80*   < > 1.80* 1.87* 2.21* 1.77*   PHOSPHORUS  --   --  3.5  --  3.1 3.7  --   --     < > = values in this interval not displayed.                 Assessment/Plan:       1. CRISELDA (acute kidney injury) (HCC)  The etiology is not very clear   Possible urinary retention, also we need to rule out diabetic  nephropathy considering increase hemoglobin A1c  No uremic symptoms  Renal dose of medication  Avoid nephrotoxins  Continue same medication regimen  We will try to obtain old records to compare kidney function level  Recheck labs in 4 weeks    2. Essential hypertension  Controlled  Continue same medication regimen  Continue low-sodium diet      3. Bladder outflow obstruction  I have reviewed the renal ultrasound images with the patient, it showed no hydronephrosis however there is distended bladder  - REFERRAL TO UROLOGY    4.  Coronary artery disease  I appreciate cardiology team decision to postpone any angiogram at the moment considering the patient has high risk of contrast-induced nephropathy  However if angiogram/angioplasty deemed to be absolutely needed I would recommend to hydrate the patient well luda procedure.  Use the smallest amount of IV contrast possible  Follow-up kidney tests

## 2020-02-12 NOTE — TELEPHONE ENCOUNTER
Pt phone call transferred from operators. Pt states his prescriptions at [facility] are inactive, he will be out of his medications today. Confirmed from last OV note by JS, pt to continue medications listed in his MAR. Advised pt we will reach out to his pharmacy.     Called [facility] pharmacy, discussed with Luis Angel pharmacist. Verbal confirmation of pt's medications, dosages, and frequency provided. Luis Angel read back orders.

## 2020-02-13 NOTE — TELEPHONE ENCOUNTER
We received some labs from this patient dated back to 2017. On the cover sheet it says that you had requested to see them. They are scanned in Media.    Thank you

## 2020-02-18 ENCOUNTER — TELEPHONE (OUTPATIENT)
Dept: CARDIOLOGY | Facility: MEDICAL CENTER | Age: 60
End: 2020-02-18

## 2020-02-19 ENCOUNTER — HOSPITAL ENCOUNTER (OUTPATIENT)
Dept: LAB | Facility: MEDICAL CENTER | Age: 60
End: 2020-02-19
Attending: INTERNAL MEDICINE
Payer: COMMERCIAL

## 2020-02-19 DIAGNOSIS — N32.0 BLADDER OUTFLOW OBSTRUCTION: ICD-10-CM

## 2020-02-19 DIAGNOSIS — I10 ESSENTIAL HYPERTENSION: ICD-10-CM

## 2020-02-19 DIAGNOSIS — N17.9 AKI (ACUTE KIDNEY INJURY) (HCC): ICD-10-CM

## 2020-02-19 LAB
ANION GAP SERPL CALC-SCNC: 9 MMOL/L (ref 0–11.9)
BUN SERPL-MCNC: 25 MG/DL (ref 8–22)
CALCIUM SERPL-MCNC: 9.3 MG/DL (ref 8.5–10.5)
CHLORIDE SERPL-SCNC: 104 MMOL/L (ref 96–112)
CO2 SERPL-SCNC: 26 MMOL/L (ref 20–33)
CREAT SERPL-MCNC: 1.88 MG/DL (ref 0.5–1.4)
FASTING STATUS PATIENT QL REPORTED: NORMAL
GLUCOSE SERPL-MCNC: 158 MG/DL (ref 65–99)
POTASSIUM SERPL-SCNC: 4.3 MMOL/L (ref 3.6–5.5)
SODIUM SERPL-SCNC: 139 MMOL/L (ref 135–145)

## 2020-02-19 PROCEDURE — 80048 BASIC METABOLIC PNL TOTAL CA: CPT

## 2020-02-19 PROCEDURE — 36415 COLL VENOUS BLD VENIPUNCTURE: CPT

## 2020-02-19 ASSESSMENT — ENCOUNTER SYMPTOMS
WEIGHT LOSS: 0
ORTHOPNEA: 0
DIZZINESS: 0
CLAUDICATION: 0
PND: 0
SHORTNESS OF BREATH: 0
WEAKNESS: 0
PALPITATIONS: 0

## 2020-02-19 NOTE — TELEPHONE ENCOUNTER
"Called pt back. He states he started feeling better shortly after last telephone encounter. He used two packets of Emergen-C that day and started feeling better. This morning he states he feels \"fantastic\". Pt also states his BP has improved. Pt is requesting an sooner FV with JS. Advised to bring list of BP readings. Verbalized understanding. Transferred call to schedulers.  "

## 2020-02-19 NOTE — PROGRESS NOTES
Chief Complaint   Patient presents with   • MI (Non ST Segment Elevation MI)   • Coronary Artery Disease   • Cardiomyopathy (Ischemic)       Subjective:   Conner Fox is a 59 y.o. male who presents today for his coronary artery disease.     Patient was last seen by myself on 2/6/20. He was unable to tolerate the IMDUR that prescribed as it made him feel lightheaded. His BP was borderline low so his Amlodipine was discontinued.     During his last visit his creatinine continued to increase.  It was discovered that patient was taking both lisinopril and losartan daily.  He was referred to nephrology.  His lisinopril was discontinued and a repeat BMP was ordered.    Past medical history significant for hypertension, renal insufficiency, non-obstructive kidney stones, BPH and renal cyst.    Today patient states he needs to go back to work by April and needs a work release letter.     Overall he is feeling well. Denies any significant concerns or complaints. Denies having any chest pain or dyspnea on exertion. Primary concern today is that he needs to return to work by no later than April and would like to know the plan of care from here. He did recently seen Nephrology who referred him to Urology. Nephrology appreciative that we held off on staged PCI however stated that if absolutely necessary it could be completed with luda procedure adequate hydration.     Past Medical History:   Diagnosis Date   • Hypertension      Past Surgical History:   Procedure Laterality Date   • OTHER CARDIAC SURGERY  01/10/2020    stent placement x2      History reviewed. No pertinent family history.  Social History     Socioeconomic History   • Marital status: Single     Spouse name: Not on file   • Number of children: Not on file   • Years of education: Not on file   • Highest education level: Not on file   Occupational History   • Not on file   Social Needs   • Financial resource strain: Not on file   • Food insecurity     Worry:  Not on file     Inability: Not on file   • Transportation needs     Medical: Not on file     Non-medical: Not on file   Tobacco Use   • Smoking status: Never Smoker   • Smokeless tobacco: Never Used   Substance and Sexual Activity   • Alcohol use: Not Currently   • Drug use: Never   • Sexual activity: Yes     Partners: Female   Lifestyle   • Physical activity     Days per week: Not on file     Minutes per session: Not on file   • Stress: Not on file   Relationships   • Social connections     Talks on phone: Not on file     Gets together: Not on file     Attends L[employer] service: Not on file     Active member of club or organization: Not on file     Attends meetings of clubs or organizations: Not on file     Relationship status: Not on file   • Intimate partner violence     Fear of current or ex partner: Not on file     Emotionally abused: Not on file     Physically abused: Not on file     Forced sexual activity: Not on file   Other Topics Concern   • Not on file   Social History Narrative   • Not on file     Allergies   Allergen Reactions   • Levofloxacin Unspecified     GI Bleeding       Outpatient Encounter Medications as of 2/20/2020   Medication Sig Dispense Refill   • losartan (COZAAR) 25 MG Tab Take 0.5 Tabs by mouth every day. 30 Tab 11   • aspirin EC (ECOTRIN) 81 MG Tablet Delayed Response Take 81 mg by mouth every day.     • atorvastatin (LIPITOR) 80 MG tablet Take 80 mg by mouth every evening.     • carvedilol (COREG) 25 MG Tab Take 25 mg by mouth 2 times a day, with meals.     • prasugrel (EFFIENT) 10 MG Tab Take 10 mg by mouth every day.     • [DISCONTINUED] losartan (COZAAR) 50 MG Tab losartan 50 mg tablet     • [DISCONTINUED] isosorbide mononitrate SR (IMDUR) 30 MG TABLET SR 24 HR isosorbide mononitrate ER 30 mg tablet,extended release 24 hr     • [DISCONTINUED] doxycycline (MONODOX) 100 MG capsule doxycycline monohydrate 100 mg capsule     • [DISCONTINUED] amLODIPine (NORVASC) 2.5 MG Tab amlodipine  "2.5 mg tablet       No facility-administered encounter medications on file as of 2/20/2020.      Review of Systems   Constitutional: Negative for malaise/fatigue and weight loss.   Respiratory: Negative for shortness of breath.    Cardiovascular: Negative for chest pain, palpitations, orthopnea, claudication, leg swelling and PND.   Neurological: Negative for dizziness and weakness.   All other systems reviewed and are negative.       Objective:   /80 (BP Location: Left arm, Patient Position: Sitting, BP Cuff Size: Adult)   Pulse (!) 56   Ht 1.778 m (5' 10\")   Wt 100.2 kg (221 lb)   SpO2 97%   BMI 31.71 kg/m²     Physical Exam   Constitutional: He is oriented to person, place, and time. He appears well-developed and well-nourished. No distress.   HENT:   Head: Normocephalic.   Eyes: EOM are normal.   Neck: No JVD present.   Cardiovascular: Normal rate and regular rhythm.   Pulmonary/Chest: Effort normal and breath sounds normal. No respiratory distress.   Abdominal: Soft. There is no abdominal tenderness.   Musculoskeletal:         General: No edema.   Neurological: He is alert and oriented to person, place, and time.   Skin: Skin is warm and dry.   Psychiatric: He has a normal mood and affect. His behavior is normal.        Coronary Angiogram 1/10/2020:  Conclusions  1. 100% thrombotically occluded LCx culprit.  2. 80% focal mid ramus stenosis.  3. 80% focal mid LAD stenosis.  4. No ascending arch or thoracic descending aortic dissection.  5. LVEF 50% prior to intervention.  6. Successful PCI culprit LCx (3.0 x 30 mm Abdullahi GARRY), good result withresidual distal stent thrombus but SHERRI-3 flow.     Recommendations  * Continue medical management and risk factor modification.  * Effient for at least one year and Aspirin indefinitely.  * Staged PCI LAD and ramus and reevaluation of distal LCx within 6 to 12 weeks.     Coronary Angiogram 1/11/2020:  Pre-operative Diagnosis:  Status post stenting of the mid left " circumflex artery for non-STEMI with recurrent angina     Post-operative Diagnosis:   1. Patent previously placed stent in the mid left circumflex artery with 95% stenosis distal to the stent 2.  Status post stenting of the distal left circumflex artery with 2.5 x 18 mm [place] drug eluting stent  3.  80% mid left anterior descending artery (LAD) stenosis with total occlusion of distal LAD  4.  70% proximal stenosis and 80% mid stenosis of the first diagonal branch of LAD     Renal US 1/11/2020:  1.  Bilateral nephrolithiasis, no hydronephrosis or obstructive changes are appreciated.  2.  Left upper pole renal cyst.     TTE 1/10/2020:  CONCLUSIONS  Moderately reduced left ventricular systolic function.  Left ventricular ejection fraction is visually estimated to be 40%.  Moderate concentric left ventricular hypertrophy.  Akinesis lateral wall.  Hypokinesis anterior wall.  Mild mitral regurgitation.  Aortic sclerosis without stenosis.  Mild aortic insufficiency.  Right heart pressures are consistent with moderate pulmonary   hypertension.  Ascending aorta is dilated with a diameter of 4.4 cm.  No prior study is available for comparison.     CT Chest, Abdomen and Pelvis 1/17/2020:   IMPRESSION:  1. A 4.5 cm ascending aortic aneurysm. No evidence of aneurysm rupture on this noncontrast study. No intramural hematoma. Evaluation for aortic dissection is limited without IV contrast.  2. Nonobstructing left nephrolithiasis.  3. Indeterminate 1.5 cm right lower pole renal lesion. It may represent a solid mass. Outpatient evaluation with contrast-enhanced renal protocol MRI is advised.  4. Fat stranding in the region of the right femoral vessels, likely related to prior vascular access. If there is clinical concern for DVT, consider ultrasound DVT study.    Assessment:     1. NSTEMI (non-ST elevated myocardial infarction) (HCC)  EKG   2. Coronary artery disease involving native coronary artery of native heart with unstable  angina pectoris (HCC)     3. Ischemic cardiomyopathy     4. Essential hypertension     5. CRISELDA (acute kidney injury) (HCC)     6. Ascending aorta dilatation (HCC)     7. Elevated hemoglobin A1c         Medical Decision Making:  Today's Assessment / Status / Plan:     CAD S/P NSTEMI:  -LHC on 1/11/2020 showed patent previously placed LCx stent (placed on 1/10/2020), underwent subsequent PCI on 1/11/20 to LCx distal to previous stent.  -Plan is for staged PCI of the LAD and ramus once renal function stabilizes.   -Continues to deny angina or dyspnea.  -LDL on 1/11/2020 was 66, at goal of <70.  -DAPT (ASA 81 mg daily and Effient 10 mg daily) for minimum of one year after staged PCI is completed.   -Continue Atorvastatin 80 mg daily, losartan 12.5 mg daily and Coreg 25 mg BID.    Per Dr. Fadi Najjar nephrology note:  I appreciate cardiology team decision to postpone any angiogram at the moment considering the patient has high risk of contrast-induced nephropathy  However if angiogram/angioplasty deemed to be absolutely needed I would recommend to hydrate the patient well luda procedure.  Use the smallest amount of IV contrast possible  Follow-up kidney tests      Ischemic CM (40%):  -Continues to appear euvolemic.  -Continue BB/ARB as above.  -No Aldactone due to renal function.     Hypertension:  -Stable.   -Continue Coreg 25 mg twice daily and losartan 12.5 mg daily.    Ascending Aortic Dilation:   -4.5 cm per CT on 1/17/2020.    CRISELDA:  -Creatinine improved with discontinuation of lisinopril and decreased dosing of losartan (was on both by accident after discharge).  -Cr 1.86-->1.8-->1.87-->2.21-->1.77-->1.88).  -Consulted by Urology, will have consult note scanned in to the media tab.   -Appreciative for nephrology recommendations.     Elevated A1C (6.7):  -Discussed the need to follow up with a PCP as soon as possible to discuss this further and possibly be started on oral medication.     Will discuss case with   Jasiel to see what the next best step is. Encouraged patient to call our office should any questions or concerns arise in the mean time. Patient understands and agrees with the plan of care.      Collaborating Provider: Dr. Lindsay Weathers       Please note that this dictation was created using voice recognition software. I have made every reasonable attempt to correct obvious errors, but I expect that there are errors of grammar and possibly content I did not discover before finalizing the note.

## 2020-02-19 NOTE — TELEPHONE ENCOUNTER
JS      Patient is calling to discuss his upcoming appt. He wants to know if the appt should be moved up after his recent issues. He can be reached at [phone].

## 2020-02-20 ENCOUNTER — HOSPITAL ENCOUNTER (OUTPATIENT)
Facility: MEDICAL CENTER | Age: 60
End: 2020-02-20
Attending: INTERNAL MEDICINE
Payer: COMMERCIAL

## 2020-02-20 ENCOUNTER — OFFICE VISIT (OUTPATIENT)
Dept: CARDIOLOGY | Facility: MEDICAL CENTER | Age: 60
End: 2020-02-20
Payer: COMMERCIAL

## 2020-02-20 VITALS
OXYGEN SATURATION: 97 % | DIASTOLIC BLOOD PRESSURE: 80 MMHG | HEART RATE: 56 BPM | SYSTOLIC BLOOD PRESSURE: 126 MMHG | HEIGHT: 70 IN | WEIGHT: 221 LBS | BODY MASS INDEX: 31.64 KG/M2

## 2020-02-20 DIAGNOSIS — I25.5 ISCHEMIC CARDIOMYOPATHY: ICD-10-CM

## 2020-02-20 DIAGNOSIS — I21.4 NSTEMI (NON-ST ELEVATED MYOCARDIAL INFARCTION) (HCC): ICD-10-CM

## 2020-02-20 DIAGNOSIS — I25.110 CORONARY ARTERY DISEASE INVOLVING NATIVE CORONARY ARTERY OF NATIVE HEART WITH UNSTABLE ANGINA PECTORIS (HCC): ICD-10-CM

## 2020-02-20 DIAGNOSIS — I77.810 ASCENDING AORTA DILATATION (HCC): ICD-10-CM

## 2020-02-20 DIAGNOSIS — I10 ESSENTIAL HYPERTENSION: ICD-10-CM

## 2020-02-20 DIAGNOSIS — R73.09 ELEVATED HEMOGLOBIN A1C: ICD-10-CM

## 2020-02-20 DIAGNOSIS — N17.9 AKI (ACUTE KIDNEY INJURY) (HCC): ICD-10-CM

## 2020-02-20 LAB
CREAT UR-MCNC: 197.2 MG/DL
MICROALBUMIN UR-MCNC: 11.7 MG/DL
MICROALBUMIN/CREAT UR: 59 MG/G (ref 0–30)

## 2020-02-20 PROCEDURE — 99214 OFFICE O/P EST MOD 30 MIN: CPT | Performed by: NURSE PRACTITIONER

## 2020-02-20 PROCEDURE — 82043 UR ALBUMIN QUANTITATIVE: CPT

## 2020-02-20 PROCEDURE — 82570 ASSAY OF URINE CREATININE: CPT

## 2020-02-20 PROCEDURE — 93000 ELECTROCARDIOGRAM COMPLETE: CPT | Performed by: INTERNAL MEDICINE

## 2020-02-20 RX ORDER — AMLODIPINE BESYLATE 2.5 MG/1
TABLET ORAL
COMMUNITY
End: 2020-02-20

## 2020-02-20 RX ORDER — DOXYCYCLINE 100 MG/1
CAPSULE ORAL
COMMUNITY
End: 2020-02-20

## 2020-02-20 RX ORDER — ISOSORBIDE MONONITRATE 30 MG/1
TABLET, EXTENDED RELEASE ORAL
COMMUNITY
End: 2020-02-20

## 2020-02-20 RX ORDER — LOSARTAN POTASSIUM 50 MG/1
TABLET ORAL
COMMUNITY
End: 2020-02-20

## 2020-02-21 ENCOUNTER — TELEPHONE (OUTPATIENT)
Dept: CARDIOLOGY | Facility: MEDICAL CENTER | Age: 60
End: 2020-02-21

## 2020-02-21 DIAGNOSIS — I25.10 CORONARY ARTERY DISEASE INVOLVING NATIVE CORONARY ARTERY OF NATIVE HEART WITHOUT ANGINA PECTORIS: ICD-10-CM

## 2020-02-21 PROBLEM — N13.8 BENIGN PROSTATIC HYPERPLASIA WITH URINARY OBSTRUCTION: Status: ACTIVE | Noted: 2020-02-18

## 2020-02-21 PROBLEM — R73.09 ELEVATED HEMOGLOBIN A1C: Status: ACTIVE | Noted: 2020-02-21

## 2020-02-21 PROBLEM — N20.0 KIDNEY STONE: Status: ACTIVE | Noted: 2020-02-18

## 2020-02-21 PROBLEM — N28.1 RENAL CYST: Status: ACTIVE | Noted: 2020-02-18

## 2020-02-21 PROBLEM — N40.1 BENIGN PROSTATIC HYPERPLASIA WITH URINARY OBSTRUCTION: Status: ACTIVE | Noted: 2020-02-18

## 2020-02-21 PROBLEM — N28.9 RENAL INSUFFICIENCY SYNDROME: Status: ACTIVE | Noted: 2020-02-18

## 2020-02-21 LAB — EKG IMPRESSION: NORMAL

## 2020-02-21 NOTE — TELEPHONE ENCOUNTER
JALEESA Hood with Imaging scheduling is calling to clarify order, per Sana pt thought a NM-Stress test was going to be ordered and not Pet Scan. Please call Sana at [phone].

## 2020-02-21 NOTE — TELEPHONE ENCOUNTER
Message below received regarding possible stage PCI verus MPI. Called patient at home and discussed with him, he is agreeable to proceed. Cardiac PET scan ordered, patient will call and schedule an apt for early March. He was very appreciative for the call and Dr. Weathers's time to look over his chart and provide recommendations.       ----- Message from Lindsay Weathers M.D. sent at 2/21/2020 10:53 AM PST -----  I review his angio  Don't think he would benefit much from PCI especially since he is doing well  Lets schedule stress MPI in early March

## 2020-02-24 NOTE — TELEPHONE ENCOUNTER
ALEXA Saba.  You 3 days ago      I ordered a PET scan.        Attempted to call Sana ext. [phone], no answer and no option for VM. Noted pt is scheduled for Cardiac PET scan on 03/4/20 at 0800.

## 2020-02-28 DIAGNOSIS — I10 ESSENTIAL HYPERTENSION: ICD-10-CM

## 2020-02-28 RX ORDER — AMLODIPINE BESYLATE 2.5 MG/1
2.5 TABLET ORAL DAILY
Qty: 90 TAB | Refills: 0 | Status: SHIPPED | OUTPATIENT
Start: 2020-02-28 | End: 2020-04-17

## 2020-03-04 ENCOUNTER — HOSPITAL ENCOUNTER (OUTPATIENT)
Dept: RADIOLOGY | Facility: MEDICAL CENTER | Age: 60
End: 2020-03-04
Attending: NURSE PRACTITIONER
Payer: COMMERCIAL

## 2020-03-04 DIAGNOSIS — I25.10 CORONARY ARTERY DISEASE INVOLVING NATIVE CORONARY ARTERY OF NATIVE HEART WITHOUT ANGINA PECTORIS: ICD-10-CM

## 2020-03-04 PROCEDURE — 93018 CV STRESS TEST I&R ONLY: CPT | Performed by: INTERNAL MEDICINE

## 2020-03-04 PROCEDURE — 78492 MYOCRD IMG PET MLT RST&STRS: CPT | Mod: 26 | Performed by: INTERNAL MEDICINE

## 2020-03-04 PROCEDURE — A9555 RB82 RUBIDIUM: HCPCS

## 2020-03-05 NOTE — PROCEDURES
REFERRING PHYSICIAN:  JOSR Cash    AGE:   59.     GENDER:   Male.  HEIGHT:   5 feet 10 inches.  WEIGHT:   221   pounds.    INDICATIONS:   Ischemic cardiomyopathy and coronary arthrosclerosis due to   lipid-rich plaque.    PROCEDURE:   The patient reviewed and signed the acknowledgement for testing   form.   The patient was in a fasting state and was properly prepared for   testing.   An intravenous line was inserted and a flush of normal saline   followed to insure line patency.    A transmission scan was acquired for attenuation correction using the internal   Germanium sources.   The patient was then administered 30.1 mCi of   Rubidium-82.   Approximately 90 seconds after the infusion, resting imagine   were obtained with ECG-gating.   Following the resting series, the patient   administered 57 mg of dipyridamole over four minutes.   The blood pressure,   heart rate and ECG were monitored and recorded.   After the dipyridamole   infusion was completed, another transmission scan for attenuation correction   was obtained.   The patient was then administered 30 mCi of Rubidium-82.     Approximately 90 seconds after the infusion, Peak stress images were obtained   with ECG-gating.    CLINICAL RESPONSE:   Resting blood pressure was 137/81 with a heart rate of   56.   Immediately post-dipyridamole infusion the blood pressure was 110/74   with a heart rate of 61.   After a recovery period the blood pressure was   136/53 with a heart rate of 63.    The patient experienced shortness of breath, flush, and chest tightness as   well as headache during testing.    Aminophylline 0 mg was administered following the scan.    ELECTROCARDIOGRAPHIC FINDINGS:  Resting EKG showed sinus bradycardia with left   axis deviation, nonspecific ST changes in the inferior leads and significant   T-wave inversions in the lateral leads suggestive of ischemia.  There were   occasional PVCs during stress agent administration with an  equivocal stress   EKG due to resting EKG changes.    SCINTOGRAPHIC FINDINGS:  The QC data for the scan was reviewed and was within   acceptable limits.  There was a large severe severity defect in the septal   lateral wall in the entire mid to basal inferolateral and anterolateral walls,   which was mostly nonreversible.  The total summed stress score was 16, summed   rest score was 13 indicating a summed difference score of 3 indicating mild   ischemia and a large resting scar versus hibernating myocardium.    GATED WALL MOTION FINDINGS:  Resting ejection fraction 38%, stress ejection   fraction 42%.  There is associated wall motion abnormalities in the   inferolateral and anterolateral walls in the same distribution as the   myocardial scar.    CONCLUSIONS AND IMPRESSIONS:  Large scar in the inferolateral and   anterolateral wall with minimal reversible ischemia with a summed difference   score of 3.       ____________________________________     MD LETITIA Cox / RUTH    DD:  03/04/2020 15:39:36  DT:  03/04/2020 18:25:17    D#:  [id number]  Job#:  [id number]    cc: Abeba OVALLES

## 2020-03-09 ENCOUNTER — TELEPHONE (OUTPATIENT)
Dept: CARDIOLOGY | Facility: MEDICAL CENTER | Age: 60
End: 2020-03-09

## 2020-03-09 NOTE — TELEPHONE ENCOUNTER
JALEESA/travis    Pt calling to discuss and receive interpretation of his PET test results from last week.    Please call Conner

## 2020-03-09 NOTE — TELEPHONE ENCOUNTER
ALEXA Saba.  You 1 hour ago (11:37 AM)      Waiting on recommendations from Dr. Weathers.         Called pt to discuss, verbalized understanding. He will wait for a call back by the end of the week. Appreciative of call.

## 2020-03-09 NOTE — TELEPHONE ENCOUNTER
Conner Fox Jocelyn C, A.P.R.N. 3 days ago         Abeba,     I'm wondering what the PET test result is and how it impacts  my return to work or ?     As always thank you for your time,     Conner Fox       To Abeba

## 2020-03-12 ENCOUNTER — TELEPHONE (OUTPATIENT)
Dept: CARDIOLOGY | Facility: MEDICAL CENTER | Age: 60
End: 2020-03-12

## 2020-03-12 NOTE — TELEPHONE ENCOUNTER
PET DICTATED RESULTS  [facility] Dr. UGARTE Received: Yesterday Message Contents   DASH Saba R.N. Hey Joanne,     This is the patient that Dr. UGARTE wanted to have an MPI completed on to determine if he should have staged PCI or if he can go back to work. I sent a not to Dr. UGARTE on Monday but have not heard back. Would you follow up with him today if you can?     Thank you!!   JS      Clarification relayed to MD for advise.

## 2020-03-13 NOTE — TELEPHONE ENCOUNTER
Patient Call   Lindsay Weathers M.D.  You 1 hour ago (10:06 AM)     May return to work   No limitation    Routing comment      Returned pt call and reviewed MD recommendations.  He verbalizes understanding and states he will check with his employer if a letter is needed.  He states he will call this office or MyChart with the decision.  He is appreciative of information given and states no other concerns at this time.

## 2020-03-16 ENCOUNTER — PATIENT MESSAGE (OUTPATIENT)
Dept: CARDIOLOGY | Facility: MEDICAL CENTER | Age: 60
End: 2020-03-16

## 2020-03-18 ENCOUNTER — TELEPHONE (OUTPATIENT)
Dept: CARDIOLOGY | Facility: MEDICAL CENTER | Age: 60
End: 2020-03-18

## 2020-03-18 NOTE — TELEPHONE ENCOUNTER
Test Result Question   Conner Fox Jocelyn C, A.P.R.N. 2 days ago      Dear Abeba,     I received a call last Friday from Dr. Jasiel Adams's Nurse, that he had released me to return to work  without limitations. I have not received a comprehensive report from my PET test since i cannot convert the   dictation released on My Chart. I have some questions regarding my heart health condition and care plan moving forward. What is the current status of the partial blockages in my heart that were the subject of an additional procedure? ln order for my return to work to be approved, [facility] paperwork must be completed. I have Faxed his paperwork  to your office last Friday for completion.     Thank you for your time on this matter,     Conner Pepe message received from pt.      Pt concern relayed to MD for advise.

## 2020-03-18 NOTE — TELEPHONE ENCOUNTER
Patient Call   Lindsay Weathers M.D.  You 3 hours ago (10:48 AM)     PET scan showed that the heart wall in the area being supplied by the residual blockage is mostly scar and stent would not make much different.   Since he seems to be doing well. Continue med Rx is appropriate.    Routing comment      Replied to pt via [employer] regarding MD recommendations.

## 2020-04-01 NOTE — PROGRESS NOTES
Chief Complaint   Patient presents with   • Follow-Up     HTN   • Telephone Visit       Subjective:   Conner Fox is a 59 y.o. male who presents today for his coronary artery disease.     Patient was last seen by myself on 2/21/20. His renal function unfortunately did not improve as hoped. Case was discussed with Dr. Lindsay Weathers who recommended completing an MPI to evaluate patients residual CAD. MPI was completed on 3/4/20 and showed a large inferolateral and anterolateral wall scar with minimal reversible ischemia. Results were reviewed by Dr. Weathers and it was determined that staged PCI would likely be of very limited benefit. Patient was released back to work without limitations.     Past medical history significant for hypertension, renal insufficiency, non-obstructive kidney stones, BPH and renal cyst.    Today patient states he has been back to work for about a week now without any issues. He had one very brief episode of mid sternal chest pain that lasted for only a few seconds, otherwise has been chest pain free. Denies edema, shortness of breath or weight gain.     Past Medical History:   Diagnosis Date   • Hypertension      Past Surgical History:   Procedure Laterality Date   • OTHER CARDIAC SURGERY  01/10/2020    stent placement x2      History reviewed. No pertinent family history.  Social History     Socioeconomic History   • Marital status: Single     Spouse name: Not on file   • Number of children: Not on file   • Years of education: Not on file   • Highest education level: Not on file   Occupational History   • Not on file   Social Needs   • Financial resource strain: Not on file   • Food insecurity     Worry: Not on file     Inability: Not on file   • Transportation needs     Medical: Not on file     Non-medical: Not on file   Tobacco Use   • Smoking status: Never Smoker   • Smokeless tobacco: Never Used   Substance and Sexual Activity   • Alcohol use: Not Currently   • Drug  use: Never   • Sexual activity: Yes     Partners: Female   Lifestyle   • Physical activity     Days per week: Not on file     Minutes per session: Not on file   • Stress: Not on file   Relationships   • Social connections     Talks on phone: Not on file     Gets together: Not on file     Attends Yazidism service: Not on file     Active member of club or organization: Not on file     Attends meetings of clubs or organizations: Not on file     Relationship status: Not on file   • Intimate partner violence     Fear of current or ex partner: Not on file     Emotionally abused: Not on file     Physically abused: Not on file     Forced sexual activity: Not on file   Other Topics Concern   • Not on file   Social History Narrative   • Not on file     Allergies   Allergen Reactions   • Levofloxacin Unspecified     GI Bleeding       Outpatient Encounter Medications as of 4/2/2020   Medication Sig Dispense Refill   • metFORMIN (GLUCOPHAGE) 500 MG Tab Take 500 mg by mouth 2 times a day, with meals.     • nitroglycerin (NITROSTAT) 0.4 MG SL Tab Place 1 Tab under tongue as needed for Chest Pain (Take 5 minutes apart, if after 2nd dose you still have CP take a 3rd and call 911.). 25 Tab 0   • losartan (COZAAR) 25 MG Tab Take 0.5 Tabs by mouth every day. 30 Tab 11   • aspirin EC (ECOTRIN) 81 MG Tablet Delayed Response Take 81 mg by mouth every day.     • atorvastatin (LIPITOR) 80 MG tablet Take 80 mg by mouth every evening.     • carvedilol (COREG) 25 MG Tab Take 25 mg by mouth 2 times a day, with meals.     • prasugrel (EFFIENT) 10 MG Tab Take 10 mg by mouth every day.     • amLODIPine (NORVASC) 2.5 MG Tab Take 1 Tab by mouth every day. 90 Tab 0     No facility-administered encounter medications on file as of 4/2/2020.      Review of Systems   Constitutional: Negative for malaise/fatigue and weight loss.   Respiratory: Negative for shortness of breath.    Cardiovascular: Negative for chest pain, palpitations, orthopnea,  "claudication, leg swelling and PND.        One very brief episode of mid sternal chest pain that lasted a few seconds.    Neurological: Negative for dizziness and weakness.   All other systems reviewed and are negative.       Objective:   BP (!) 168/102 (BP Location: Left arm, Patient Position: Sitting, BP Cuff Size: Adult)   Pulse (!) 56   Ht 1.778 m (5' 10\")   Wt 100.2 kg (220 lb 14.4 oz)   BMI 31.70 kg/m²     Physical Exam   Unable to complete PE due to appointment conducted via telephone.     Coronary Angiogram 1/10/2020:  Conclusions  1. 100% thrombotically occluded LCx culprit.  2. 80% focal mid ramus stenosis.  3. 80% focal mid LAD stenosis.  4. No ascending arch or thoracic descending aortic dissection.  5. LVEF 50% prior to intervention.  6. Successful PCI culprit LCx (3.0 x 30 mm [place] GARRY), good result withresidual distal stent thrombus but SHERRI-3 flow.     Recommendations  * Continue medical management and risk factor modification.  * Effient for at least one year and Aspirin indefinitely.  * Staged PCI LAD and ramus and reevaluation of distal LCx within 6 to 12 weeks.     Coronary Angiogram 1/11/2020:  Pre-operative Diagnosis:  Status post stenting of the mid left circumflex artery for non-STEMI with recurrent angina     Post-operative Diagnosis:   1. Patent previously placed stent in the mid left circumflex artery with 95% stenosis distal to the stent 2.  Status post stenting of the distal left circumflex artery with 2.5 x 18 mm Abdullahi drug eluting stent  3.  80% mid left anterior descending artery (LAD) stenosis with total occlusion of distal LAD.  4.  70% proximal stenosis and 80% mid stenosis of the first diagonal branch of LAD     Renal US 1/11/2020:  1.  Bilateral nephrolithiasis, no hydronephrosis or obstructive changes are appreciated.  2.  Left upper pole renal cyst.     TTE 1/10/2020:  CONCLUSIONS  Moderately reduced left ventricular systolic function.  Left ventricular ejection fraction is " visually estimated to be 40%.  Moderate concentric left ventricular hypertrophy.  Akinesis lateral wall.  Hypokinesis anterior wall.  Mild mitral regurgitation.  Aortic sclerosis without stenosis.  Mild aortic insufficiency.  Right heart pressures are consistent with moderate pulmonary   hypertension.  Ascending aorta is dilated with a diameter of 4.4 cm.  No prior study is available for comparison.     CT Chest, Abdomen and Pelvis 1/17/2020:   IMPRESSION:  1. A 4.5 cm ascending aortic aneurysm. No evidence of aneurysm rupture on this noncontrast study. No intramural hematoma. Evaluation for aortic dissection is limited without IV contrast.  2. Nonobstructing left nephrolithiasis.  3. Indeterminate 1.5 cm right lower pole renal lesion. It may represent a solid mass. Outpatient evaluation with contrast-enhanced renal protocol MRI is advised.  4. Fat stranding in the region of the right femoral vessels, likely related to prior vascular access. If there is clinical concern for DVT, consider ultrasound DVT study.    NM-Cardiac PET Scan 3/4/20:  ELECTROCARDIOGRAPHIC FINDINGS:  Resting EKG showed sinus bradycardia with left axis deviation, nonspecific ST changes in the inferior leads and significant T-wave inversions in the lateral leads suggestive of ischemia.  There were occasional PVCs during stress agent administration with an equivocal stress EKG due to resting EKG changes.     SCINTOGRAPHIC FINDINGS:  The QC data for the scan was reviewed and was within   acceptable limits.  There was a large severe severity defect in the septal lateral wall in the entire mid to basal inferolateral and anterolateral walls, which was mostly  nonreversible. The total summed stress score was 16, summed rest score was 13 indicating a summed difference score of 3 indicating mild ischemia and a large resting scar versus hibernating myocardium.     GATED WALL MOTION FINDINGS:  Resting ejection fraction 38%, stress ejection   fraction 42%.   There is associated wall motion abnormalities in the inferolateral and anterolateral walls in the same distribution as the myocardial scar.     CONCLUSIONS AND IMPRESSIONS:  Large scar in the inferolateral and anterolateral wall with minimal reversible ischemia with a summed difference score of 3.    Assessment:     1. Ischemic cardiomyopathy  EC-ECHOCARDIOGRAM COMPLETE W/O CONT       Medical Decision Making:  Today's Assessment / Status / Plan:     CAD S/P NSTEMI:  -LHC on 1/11/2020 showed patent previously placed LCx stent (placed on 1/10/2020), underwent subsequent PCI on 1/11/20 to LCx distal to previous stent.  -We are no longer planning staged PCI of the LAD and ramus as cardiac PET scan showed area to be mostly scar, stenting would make little difference and increase his risk of progressive renal failure.   -Continues to deny angina or dyspnea.  -LDL on 1/11/2020 was 66, at goal of <70.  -DAPT (ASA 81 mg daily and Effient 10 mg daily) for minimum of one year after staged PCI is completed.   -Continue Atorvastatin 80 mg daily, losartan 12.5 mg daily and Coreg 25 mg BID.    Ischemic CM (40%):  -Denies symptoms of edema, weight gain or SOB.   -Continue BB/ARB as above.  -No Aldactone at this time due to renal function.   -Repeat Echocardiogram to reassess LVEF.     Hypertension:  -Elevated this am.   -Continue Coreg 25 mg twice daily and losartan 12.5 mg daily.  -Will have patient continues to monitor at home and call if BP remains elevated.     Ascending Aortic Dilation:   -4.5 cm per CT on 1/17/2020.    CRISELDA:  -Creatinine improved with discontinuation of lisinopril and decreased dosing of losartan (was on both by accident after discharge).  -Cr 1.86-->1.8-->1.87-->2.21-->1.77-->1.88).  -Consulted by Urology, will have consult note scanned in to the media tab.   -Continues to be followed by nephrologist Dr. Najjar.     Per Dr. Fadi Najjar nephrology note:  I appreciate cardiology team decision to postpone any  angiogram at the moment considering the patient has high risk of contrast-induced nephropathy  However if angiogram/angioplasty deemed to be absolutely needed I would recommend to hydrate the patient well luda procedure.  Use the smallest amount of IV contrast possible  Follow-up kidney tests    Elevated A1C (6.7):  -Recently started on Metformin by his PCP.    Future Appointments   Date Time Provider Department Center   5/4/2020  1:15 PM [facility] EXAM 9 ECHO [facility]   7/6/2020 12:40 PM Lindsay Weathers M.D. [facility] None     Collaborating Provider: Dr. Brandon Cano    Please note that this dictation was created using voice recognition software. I have made every reasonable attempt to correct obvious errors, but I expect that there are errors of grammar and possibly content I did not discover before finalizing the note.            Telephone Appointment Visit   As a means of avoiding spread of COVID-19, this visit is being conducted by telephone. This telephone visit was initiated by the patient and they verbally consented.    Time at start of call: 0750    Reason for Call:  New Symptom/ Concern: Cardiac PET scan results and plan of care    Patient Comments / History:       Labs / Images Reviewed   Cardiac PET scan  Correlation of previous echocardiogram and cardiac catheterization with cardiac PET scan results.    Assessment and Plan:     1. Ischemic cardiomyopathy  - EC-ECHOCARDIOGRAM COMPLETE W/O CONT; Future    Other orders  - metFORMIN (GLUCOPHAGE) 500 MG Tab; Take 500 mg by mouth 2 times a day, with meals.  - nitroglycerin (NITROSTAT) 0.4 MG SL Tab; Place 1 Tab under tongue as needed for Chest Pain (Take 5 minutes apart, if after 2nd dose you still have CP take a 3rd and call 911.).  Dispense: 25 Tab; Refill: 0  Repeat echocardiogram     Follow-up: No follow-ups on file.    Time at end of call: 0820  Total Time Spent: 21-30 minutes    ALEXA Saba.

## 2020-04-02 ENCOUNTER — OFFICE VISIT (OUTPATIENT)
Dept: CARDIOLOGY | Facility: MEDICAL CENTER | Age: 60
End: 2020-04-02
Payer: COMMERCIAL

## 2020-04-02 VITALS
WEIGHT: 220.9 LBS | HEIGHT: 70 IN | SYSTOLIC BLOOD PRESSURE: 168 MMHG | HEART RATE: 56 BPM | DIASTOLIC BLOOD PRESSURE: 102 MMHG | BODY MASS INDEX: 31.62 KG/M2

## 2020-04-02 DIAGNOSIS — I25.5 ISCHEMIC CARDIOMYOPATHY: ICD-10-CM

## 2020-04-02 PROCEDURE — 99443 PR PHYSICIAN TELEPHONE EVALUATION 21-30 MIN: CPT | Mod: CR | Performed by: NURSE PRACTITIONER

## 2020-04-02 RX ORDER — NITROGLYCERIN 0.4 MG/1
0.4 TABLET SUBLINGUAL PRN
Qty: 25 TAB | Refills: 0 | Status: ON HOLD | OUTPATIENT
Start: 2020-04-02 | End: 2020-07-28 | Stop reason: SDUPTHER

## 2020-04-02 ASSESSMENT — ENCOUNTER SYMPTOMS
PND: 0
PALPITATIONS: 0
DIZZINESS: 0
SHORTNESS OF BREATH: 0
WEIGHT LOSS: 0
CLAUDICATION: 0
ORTHOPNEA: 0
WEAKNESS: 0

## 2020-04-02 ASSESSMENT — FIBROSIS 4 INDEX: FIB4 SCORE: 0.64

## 2020-04-17 ENCOUNTER — TELEPHONE (OUTPATIENT)
Dept: CARDIOLOGY | Facility: MEDICAL CENTER | Age: 60
End: 2020-04-17

## 2020-04-17 DIAGNOSIS — I10 ESSENTIAL HYPERTENSION: ICD-10-CM

## 2020-04-17 RX ORDER — OLMESARTAN MEDOXOMIL, AMLODIPINE AND HYDROCHLOROTHIAZIDE TABLET 40/10/25 MG 40; 10; 25 MG/1; MG/1; MG/1
1 TABLET ORAL DAILY
Qty: 30 TAB | Refills: 0 | Status: SHIPPED | OUTPATIENT
Start: 2020-04-17 | End: 2020-07-22

## 2020-04-17 NOTE — TELEPHONE ENCOUNTER
Guilherme Montano M.D.  You 22 minutes ago (3:51 PM)      I suggest stopping amlodipine and losartan and starting tribenzor (Amlodipine, Olmesartan, HCTZ combination tablet)- I suggest Tribenzor 40-10-25. One tablet daily. Continue to measure daily BP. BMP in one week. Follow up virtual visit afterwards    Routing comment       You  Guilherme Montano M.D. 1 hour ago (2:38 PM)      Please advise if any medication recommendations for pt while JS is out of office. Thank you!       Called pt to discuss, verbalized understanding. Order placed for Tribenzor 40-10-25 one tablet daily and BMP in one week. Advised pt to continue checking his BP, will call for any issues or concerns. Appreciative of recommendations.

## 2020-04-17 NOTE — TELEPHONE ENCOUNTER
JALEESA/travis    Pt calling to report b/p 170/111, h/r 69.  Trouble controlling b/p, wishes to have advice.    Please call Conner

## 2020-05-04 ENCOUNTER — HOSPITAL ENCOUNTER (OUTPATIENT)
Dept: CARDIOLOGY | Facility: MEDICAL CENTER | Age: 60
End: 2020-05-04
Attending: NURSE PRACTITIONER
Payer: COMMERCIAL

## 2020-05-04 DIAGNOSIS — I25.5 ISCHEMIC CARDIOMYOPATHY: ICD-10-CM

## 2020-05-04 LAB
LV EJECT FRACT  99904: 65
LV EJECT FRACT MOD 2C 99903: 70.37
LV EJECT FRACT MOD 4C 99902: 60.08
LV EJECT FRACT MOD BP 99901: 61.4

## 2020-05-04 PROCEDURE — 93306 TTE W/DOPPLER COMPLETE: CPT

## 2020-05-04 PROCEDURE — 93306 TTE W/DOPPLER COMPLETE: CPT | Mod: 26 | Performed by: INTERNAL MEDICINE

## 2020-05-27 ENCOUNTER — TELEPHONE (OUTPATIENT)
Dept: CARDIOLOGY | Facility: MEDICAL CENTER | Age: 60
End: 2020-05-27

## 2020-05-27 NOTE — TELEPHONE ENCOUNTER
Need to discuss medications. Attempted to call pt this morning, no answer and voice mailbox is full. Will try again later.

## 2020-05-27 NOTE — LETTER
May 27, 2020        Conner Fox  [address]   [address]   [place] [address]          Dear Conner,      We attempted to call you several times to discuss your current medications and was unsuccessful. Please give us a call back at [phone] at your earliest convenience.      Thank you,      Anusha LISA and Abeba PHAM

## 2020-07-20 ENCOUNTER — OFFICE VISIT (OUTPATIENT)
Dept: MEDICAL GROUP | Facility: MEDICAL CENTER | Age: 60
End: 2020-07-20
Payer: COMMERCIAL

## 2020-07-20 VITALS
SYSTOLIC BLOOD PRESSURE: 144 MMHG | HEART RATE: 60 BPM | TEMPERATURE: 97.9 F | HEIGHT: 70 IN | OXYGEN SATURATION: 96 % | DIASTOLIC BLOOD PRESSURE: 82 MMHG | WEIGHT: 220 LBS | BODY MASS INDEX: 31.5 KG/M2

## 2020-07-20 DIAGNOSIS — Z11.59 NEED FOR HEPATITIS C SCREENING TEST: ICD-10-CM

## 2020-07-20 DIAGNOSIS — Z23 NEED FOR VACCINATION: ICD-10-CM

## 2020-07-20 DIAGNOSIS — N18.30 CKD (CHRONIC KIDNEY DISEASE) STAGE 3, GFR 30-59 ML/MIN: ICD-10-CM

## 2020-07-20 DIAGNOSIS — E66.9 DIABETES MELLITUS TYPE 2 IN OBESE: ICD-10-CM

## 2020-07-20 DIAGNOSIS — I21.4 NSTEMI (NON-ST ELEVATED MYOCARDIAL INFARCTION) (HCC): ICD-10-CM

## 2020-07-20 DIAGNOSIS — Z00.00 HEALTH CARE MAINTENANCE: ICD-10-CM

## 2020-07-20 DIAGNOSIS — I25.5 ISCHEMIC CARDIOMYOPATHY: ICD-10-CM

## 2020-07-20 DIAGNOSIS — I10 ESSENTIAL HYPERTENSION: ICD-10-CM

## 2020-07-20 DIAGNOSIS — E78.2 MIXED HYPERLIPIDEMIA: ICD-10-CM

## 2020-07-20 DIAGNOSIS — N13.8 BENIGN PROSTATIC HYPERPLASIA WITH URINARY OBSTRUCTION: ICD-10-CM

## 2020-07-20 DIAGNOSIS — N40.1 BENIGN PROSTATIC HYPERPLASIA WITH URINARY OBSTRUCTION: ICD-10-CM

## 2020-07-20 DIAGNOSIS — E11.69 DIABETES MELLITUS TYPE 2 IN OBESE: ICD-10-CM

## 2020-07-20 PROCEDURE — 90471 IMMUNIZATION ADMIN: CPT | Performed by: FAMILY MEDICINE

## 2020-07-20 PROCEDURE — 90715 TDAP VACCINE 7 YRS/> IM: CPT | Performed by: FAMILY MEDICINE

## 2020-07-20 PROCEDURE — 99204 OFFICE O/P NEW MOD 45 MIN: CPT | Mod: 25 | Performed by: FAMILY MEDICINE

## 2020-07-20 PROCEDURE — 90750 HZV VACC RECOMBINANT IM: CPT | Performed by: FAMILY MEDICINE

## 2020-07-20 PROCEDURE — 90472 IMMUNIZATION ADMIN EACH ADD: CPT | Performed by: FAMILY MEDICINE

## 2020-07-20 ASSESSMENT — FIBROSIS 4 INDEX: FIB4 SCORE: 0.65

## 2020-07-20 NOTE — PROGRESS NOTES
CC: New patient: Diabetes, hypertension, hyperlipidemia, CAD/history of coronary artery stent placement/history of non-ST elevation MI, CKD  HPI:  Conner presents today to establish new PCP.    Patient has been active and independent with all ADLs.  Has the following chronic medical issues:    Diabetes mellitus type 2 in obese ([facility])  Blood glucose has been adequately controlled.  Last A1c was 6.7.  Denies polyuria polydipsia.  Patient has been on metformin 500 mg twice a day.    Essential hypertension  Has been adequately controlled on current medication. Denies headache, chest pain, and SOB.patient has been on olmesartan - amlodipine-hydrochlorothiazide 40-10-25 mg daily.  No side effects    Mixed hyperlipidemia  He has been tolerating the statin. Denies muscle pain LFTs has been normal, has history of diabetes, CAD, patient has been on pravastatin 80 mg daily.    Ischemic cardiomyopathy/ NSTEMI (non-ST elevated myocardial infarction) ([facility])/history of coronary artery stent placement  Patient has history of non-ST elevation MI in January 2020, underwent stent placement x2.  Patient has been asymptomatic.  Denies any chest pain, shortness of breath.   Last echocardiogram showed normal ejection fraction.  Last ejection fraction showed:  Left ventricular ejection fraction is visually estimated to be 65%.  Posterolateral wall hypokinesis.  Patient has been on atorvastatin 80 mg, carvedilol 25 mg twice a day, Effient 10 mg daily, aspirin 81 mg daily, nitroglycerin sublingual as needed.  Patient has been following up with cardiology regular basis.    CKD (chronic kidney disease) stage 3, GFR 30-59 ml/min ([facility])  Last GFR was 37, creatinine 1.88.  Patient has been asymptomatic.  However patient is diabetic and has been on metformin.    Due for Tdap, shingles vaccine, pneumonia vaccine.      Patient Active Problem List    Diagnosis Date Noted   • NSTEMI (non-ST elevated myocardial infarction) ([facility]) 01/10/2020     Priority:  High   • Coronary artery disease involving native coronary artery with unstable angina pectoris ([facility]) 01/13/2020     Priority: Medium   • CRISELDA (acute kidney injury) ([facility]) 01/10/2020     Priority: Medium   • Elevated hemoglobin A1c 02/21/2020   • Benign prostatic hyperplasia with urinary obstruction 02/18/2020   • Renal cyst 02/18/2020   • Kidney stone 02/18/2020   • Renal insufficiency syndrome 02/18/2020   • Essential hypertension, benign 02/06/2020   • Ischemic cardiomyopathy 01/23/2020   • Ascending aorta dilatation ([facility]) 01/23/2020   • Essential hypertension 06/26/2017   • Premature atrial complex 06/26/2017       Current Outpatient Medications   Medication Sig Dispense Refill   • Olmesartan-amLODIPine-HCTZ 40-10-25 MG Tab Take 1 tablet by mouth every day. 30 Tab 0   • metFORMIN (GLUCOPHAGE) 500 MG Tab Take 500 mg by mouth 2 times a day, with meals.     • nitroglycerin (NITROSTAT) 0.4 MG SL Tab Place 1 Tab under tongue as needed for Chest Pain (Take 5 minutes apart, if after 2nd dose you still have CP take a 3rd and call 911.). 25 Tab 0   • aspirin EC (ECOTRIN) 81 MG Tablet Delayed Response Take 81 mg by mouth every day.     • atorvastatin (LIPITOR) 80 MG tablet Take 80 mg by mouth every evening.     • carvedilol (COREG) 25 MG Tab Take 25 mg by mouth 2 times a day, with meals.     • prasugrel (EFFIENT) 10 MG Tab Take 10 mg by mouth every day.       No current facility-administered medications for this visit.          Allergies as of 07/20/2020 - Reviewed 04/02/2020   Allergen Reaction Noted   • Levofloxacin Unspecified 01/10/2020        Social History     Socioeconomic History   • Marital status: Single     Spouse name: Not on file   • Number of children: Not on file   • Years of education: Not on file   • Highest education level: Not on file   Occupational History   • Not on file   Social Needs   • Financial resource strain: Not on file   • Food insecurity     Worry: Not on file     Inability: Not on file   •  "Transportation needs     Medical: Not on file     Non-medical: Not on file   Tobacco Use   • Smoking status: Never Smoker   • Smokeless tobacco: Never Used   Substance and Sexual Activity   • Alcohol use: Not Currently   • Drug use: Never   • Sexual activity: Yes     Partners: Female   Lifestyle   • Physical activity     Days per week: Not on file     Minutes per session: Not on file   • Stress: Not on file   Relationships   • Social connections     Talks on phone: Not on file     Gets together: Not on file     Attends Anabaptism service: Not on file     Active member of club or organization: Not on file     Attends meetings of clubs or organizations: Not on file     Relationship status: Not on file   • Intimate partner violence     Fear of current or ex partner: Not on file     Emotionally abused: Not on file     Physically abused: Not on file     Forced sexual activity: Not on file   Other Topics Concern   • Not on file   Social History Narrative   • Not on file       No family history on file.    Past Surgical History:   Procedure Laterality Date   • OTHER CARDIAC SURGERY  01/10/2020    stent placement x2        ROS:  Denies any Headache, Blurred Vision, Confusion Chest pain,  Shortness of breath,  Abdominal pain, Changes of bowel or bladder, Lower ext edema, Fevers, Nights sweats, Weight Changes, Focal weakness or numbness.  All other systems are negative.    /82 (BP Location: Right arm, Patient Position: Sitting, BP Cuff Size: Adult)   Pulse 60   Temp 36.6 °C (97.9 °F) (Temporal)   Ht 1.778 m (5' 10\")   Wt 99.8 kg (220 lb)   SpO2 96%   BMI 31.57 kg/m²     Physical Exam:  Gen:         Alert and oriented, No apparent distress.  HEENT:   Perrla, TM clear,  Oralpharynx no erythema or exudates.  Neck:       No Jugular venous distension, Lymphadenopathy, Thyromegaly, Bruits.  Lungs:     Clear to auscultation bilaterally  CV:          Regular rate and rhythm. No murmurs, rubs or gallops.  Abd:         Soft " non tender, non distended. Normal active bowel sounds. No                                        Hepatosplenomegaly, No pulsatile masses.  Ext:          No clubbing, cyanosis, edema.      Assessment and Plan.   60 y.o. male     1. Diabetes mellitus type 2 in obese ([facility])  Last A1c was 6.7.  Continue on metformin 500 mg twice a day.    - HEMOGLOBIN A1C; Future    2. Essential hypertension  Controlled.  Continue on olmesartan - amlodipine-hydrochlorothiazide 40-10-25 mg daily.    3. Mixed hyperlipidemia  He has been tolerating the statin. Denies muscle pain LFTs has been normal  Continue on pravastatin 80 mg daily.    4. Ischemic cardiomyopathy/   Stable.  Asymptomatic.  Status post stent placement.  Last echocardiogram showed normal ejection fraction.  Last ejection fraction showed:  Left ventricular ejection fraction is visually estimated to be 65%.  Posterolateral wall hypokinesis.    5. NSTEMI (non-ST elevated myocardial infarction) ([facility])/history of coronary artery stent placement  Stable.  Asymptomatic.  Continue on statin, carvedilol, Effient, nitroglycerin  Continue follow-up with cardiology.    6. Health care maintenance  Due for Tdap, shingles vaccine, pneumonia vaccine.    7. CKD (chronic kidney disease) stage 3, GFR 30-59 ml/min ([facility])  Last GFR was 37, creatinine 1.88  We will repeat kidney function, if still low will change metformin to an other antidiabetic medication    - Basic Metabolic Panel; Future    8. Need for vaccination    - Tdap =>8yo IM  - Shingles Vaccine (Shingrix)    9. Need for hepatitis C screening test    - HEP C VIRUS ANTIBODY; Future

## 2020-07-22 ENCOUNTER — ANESTHESIA EVENT (OUTPATIENT)
Dept: SURGERY | Facility: MEDICAL CENTER | Age: 60
DRG: 709 | End: 2020-07-22
Payer: COMMERCIAL

## 2020-07-22 ENCOUNTER — HOSPITAL ENCOUNTER (INPATIENT)
Facility: MEDICAL CENTER | Age: 60
LOS: 6 days | DRG: 709 | End: 2020-07-28
Attending: EMERGENCY MEDICINE | Admitting: HOSPITALIST
Payer: COMMERCIAL

## 2020-07-22 ENCOUNTER — ANESTHESIA (OUTPATIENT)
Dept: SURGERY | Facility: MEDICAL CENTER | Age: 60
DRG: 709 | End: 2020-07-22
Payer: COMMERCIAL

## 2020-07-22 ENCOUNTER — APPOINTMENT (OUTPATIENT)
Dept: RADIOLOGY | Facility: MEDICAL CENTER | Age: 60
DRG: 709 | End: 2020-07-22
Attending: EMERGENCY MEDICINE
Payer: COMMERCIAL

## 2020-07-22 DIAGNOSIS — N48.30 PRIAPISM, UNSPECIFIED: ICD-10-CM

## 2020-07-22 LAB
AMPHET UR QL SCN: NEGATIVE
ANION GAP SERPL CALC-SCNC: 16 MMOL/L (ref 7–16)
APPEARANCE UR: CLEAR
BACTERIA #/AREA URNS HPF: NEGATIVE /HPF
BARBITURATES UR QL SCN: NEGATIVE
BASE EXCESS BLDV CALC-SCNC: -13 MMOL/L
BASOPHILS # BLD AUTO: 0.3 % (ref 0–1.8)
BASOPHILS # BLD: 0.03 K/UL (ref 0–0.12)
BENZODIAZ UR QL SCN: NEGATIVE
BILIRUB UR QL STRIP.AUTO: NEGATIVE
BODY TEMPERATURE: ABNORMAL CENTIGRADE
BUN SERPL-MCNC: 26 MG/DL (ref 8–22)
BZE UR QL SCN: NEGATIVE
CALCIUM SERPL-MCNC: 9.7 MG/DL (ref 8.5–10.5)
CANNABINOIDS UR QL SCN: POSITIVE
CHLORIDE SERPL-SCNC: 102 MMOL/L (ref 96–112)
CO2 SERPL-SCNC: 20 MMOL/L (ref 20–33)
COLOR UR: YELLOW
COVID ORDER STATUS COVID19: NORMAL
CREAT SERPL-MCNC: 1.57 MG/DL (ref 0.5–1.4)
EKG IMPRESSION: NORMAL
EOSINOPHIL # BLD AUTO: 0.08 K/UL (ref 0–0.51)
EOSINOPHIL NFR BLD: 0.7 % (ref 0–6.9)
EPI CELLS #/AREA URNS HPF: NEGATIVE /HPF
ERYTHROCYTE [DISTWIDTH] IN BLOOD BY AUTOMATED COUNT: 42.9 FL (ref 35.9–50)
GLUCOSE SERPL-MCNC: 125 MG/DL (ref 65–99)
GLUCOSE UR STRIP.AUTO-MCNC: NEGATIVE MG/DL
HCO3 BLDV-SCNC: 18 MMOL/L (ref 24–28)
HCT VFR BLD AUTO: 45.2 % (ref 42–52)
HGB BLD-MCNC: 15.4 G/DL (ref 14–18)
HYALINE CASTS #/AREA URNS LPF: ABNORMAL /LPF
IMM GRANULOCYTES # BLD AUTO: 0.02 K/UL (ref 0–0.11)
IMM GRANULOCYTES NFR BLD AUTO: 0.2 % (ref 0–0.9)
KETONES UR STRIP.AUTO-MCNC: ABNORMAL MG/DL
LEUKOCYTE ESTERASE UR QL STRIP.AUTO: NEGATIVE
LYMPHOCYTES # BLD AUTO: 2.05 K/UL (ref 1–4.8)
LYMPHOCYTES NFR BLD: 18.3 % (ref 22–41)
MCH RBC QN AUTO: 31.2 PG (ref 27–33)
MCHC RBC AUTO-ENTMCNC: 34.1 G/DL (ref 33.7–35.3)
MCV RBC AUTO: 91.7 FL (ref 81.4–97.8)
METHADONE UR QL SCN: NEGATIVE
MICRO URNS: ABNORMAL
MONOCYTES # BLD AUTO: 1.12 K/UL (ref 0–0.85)
MONOCYTES NFR BLD AUTO: 10 % (ref 0–13.4)
NEUTROPHILS # BLD AUTO: 7.91 K/UL (ref 1.82–7.42)
NEUTROPHILS NFR BLD: 70.5 % (ref 44–72)
NITRITE UR QL STRIP.AUTO: NEGATIVE
NRBC # BLD AUTO: 0 K/UL
NRBC BLD-RTO: 0 /100 WBC
NT-PROBNP SERPL IA-MCNC: 1642 PG/ML (ref 0–125)
OPIATES UR QL SCN: POSITIVE
OXYCODONE UR QL SCN: NEGATIVE
PCO2 BLDV: 61.1 MMHG (ref 41–51)
PCP UR QL SCN: NEGATIVE
PH BLDV: 7.08 [PH] (ref 7.31–7.45)
PH UR STRIP.AUTO: 5 [PH] (ref 5–8)
PLATELET # BLD AUTO: 200 K/UL (ref 164–446)
PMV BLD AUTO: 10.8 FL (ref 9–12.9)
PO2 BLDV: 36.4 MMHG (ref 25–40)
POTASSIUM SERPL-SCNC: 4.2 MMOL/L (ref 3.6–5.5)
PROPOXYPH UR QL SCN: NEGATIVE
PROT UR QL STRIP: 30 MG/DL
RBC # BLD AUTO: 4.93 M/UL (ref 4.7–6.1)
RBC # URNS HPF: ABNORMAL /HPF
RBC UR QL AUTO: ABNORMAL
SAO2 % BLDV: 63.9 %
SODIUM SERPL-SCNC: 138 MMOL/L (ref 135–145)
SP GR UR STRIP.AUTO: 1.02
TROPONIN T SERPL-MCNC: 24 NG/L (ref 6–19)
TROPONIN T SERPL-MCNC: 24 NG/L (ref 6–19)
UROBILINOGEN UR STRIP.AUTO-MCNC: 0.2 MG/DL
WBC # BLD AUTO: 11.2 K/UL (ref 4.8–10.8)
WBC #/AREA URNS HPF: ABNORMAL /HPF

## 2020-07-22 PROCEDURE — 71045 X-RAY EXAM CHEST 1 VIEW: CPT

## 2020-07-22 PROCEDURE — 81001 URINALYSIS AUTO W/SCOPE: CPT

## 2020-07-22 PROCEDURE — C9803 HOPD COVID-19 SPEC COLLECT: HCPCS | Performed by: EMERGENCY MEDICINE

## 2020-07-22 PROCEDURE — 160009 HCHG ANES TIME/MIN: Performed by: UROLOGY

## 2020-07-22 PROCEDURE — 0VQS3ZZ REPAIR PENIS, PERCUTANEOUS APPROACH: ICD-10-PCS | Performed by: UROLOGY

## 2020-07-22 PROCEDURE — 93005 ELECTROCARDIOGRAM TRACING: CPT

## 2020-07-22 PROCEDURE — 85660 RBC SICKLE CELL TEST: CPT

## 2020-07-22 PROCEDURE — 51701 INSERT BLADDER CATHETER: CPT

## 2020-07-22 PROCEDURE — U0003 INFECTIOUS AGENT DETECTION BY NUCLEIC ACID (DNA OR RNA); SEVERE ACUTE RESPIRATORY SYNDROME CORONAVIRUS 2 (SARS-COV-2) (CORONAVIRUS DISEASE [COVID-19]), AMPLIFIED PROBE TECHNIQUE, MAKING USE OF HIGH THROUGHPUT TECHNOLOGIES AS DESCRIBED BY CMS-2020-01-R: HCPCS

## 2020-07-22 PROCEDURE — 85025 COMPLETE CBC W/AUTO DIFF WBC: CPT

## 2020-07-22 PROCEDURE — 93005 ELECTROCARDIOGRAM TRACING: CPT | Performed by: EMERGENCY MEDICINE

## 2020-07-22 PROCEDURE — 160035 HCHG PACU - 1ST 60 MINS PHASE I: Performed by: UROLOGY

## 2020-07-22 PROCEDURE — 83880 ASSAY OF NATRIURETIC PEPTIDE: CPT

## 2020-07-22 PROCEDURE — A6454 SELF-ADHER BAND W>=3" <5"/YD: HCPCS | Performed by: UROLOGY

## 2020-07-22 PROCEDURE — 160046 HCHG PACU - 1ST 60 MINS PHASE II: Performed by: UROLOGY

## 2020-07-22 PROCEDURE — 80307 DRUG TEST PRSMV CHEM ANLYZR: CPT

## 2020-07-22 PROCEDURE — 501411 HCHG SPONGE, BABY LAP W/O RINGS: Performed by: UROLOGY

## 2020-07-22 PROCEDURE — 80048 BASIC METABOLIC PNL TOTAL CA: CPT

## 2020-07-22 PROCEDURE — 84484 ASSAY OF TROPONIN QUANT: CPT

## 2020-07-22 PROCEDURE — 160025 RECOVERY II MINUTES (STATS): Performed by: UROLOGY

## 2020-07-22 PROCEDURE — 99291 CRITICAL CARE FIRST HOUR: CPT

## 2020-07-22 PROCEDURE — 160002 HCHG RECOVERY MINUTES (STAT): Performed by: UROLOGY

## 2020-07-22 PROCEDURE — 96374 THER/PROPH/DIAG INJ IV PUSH: CPT

## 2020-07-22 PROCEDURE — A9270 NON-COVERED ITEM OR SERVICE: HCPCS | Performed by: EMERGENCY MEDICINE

## 2020-07-22 PROCEDURE — 160029 HCHG SURGERY MINUTES - 1ST 30 MINS LEVEL 4: Performed by: UROLOGY

## 2020-07-22 PROCEDURE — 700111 HCHG RX REV CODE 636 W/ 250 OVERRIDE (IP): Performed by: ANESTHESIOLOGY

## 2020-07-22 PROCEDURE — 501445 HCHG STAPLER, SKIN DISP: Performed by: UROLOGY

## 2020-07-22 PROCEDURE — 82803 BLOOD GASES ANY COMBINATION: CPT

## 2020-07-22 PROCEDURE — 36415 COLL VENOUS BLD VENIPUNCTURE: CPT

## 2020-07-22 PROCEDURE — 160041 HCHG SURGERY MINUTES - EA ADDL 1 MIN LEVEL 4: Performed by: UROLOGY

## 2020-07-22 PROCEDURE — 700111 HCHG RX REV CODE 636 W/ 250 OVERRIDE (IP): Performed by: EMERGENCY MEDICINE

## 2020-07-22 PROCEDURE — 160048 HCHG OR STATISTICAL LEVEL 1-5: Performed by: UROLOGY

## 2020-07-22 PROCEDURE — 700101 HCHG RX REV CODE 250: Performed by: ANESTHESIOLOGY

## 2020-07-22 PROCEDURE — 700102 HCHG RX REV CODE 250 W/ 637 OVERRIDE(OP): Performed by: EMERGENCY MEDICINE

## 2020-07-22 PROCEDURE — 306637 HCHG MISC ORTHO ITEM RC 0274

## 2020-07-22 PROCEDURE — 0V9S3ZZ DRAINAGE OF PENIS, PERCUTANEOUS APPROACH: ICD-10-PCS | Performed by: EMERGENCY MEDICINE

## 2020-07-22 PROCEDURE — 96375 TX/PRO/DX INJ NEW DRUG ADDON: CPT

## 2020-07-22 PROCEDURE — 700101 HCHG RX REV CODE 250: Performed by: EMERGENCY MEDICINE

## 2020-07-22 RX ORDER — ONDANSETRON 2 MG/ML
4 INJECTION INTRAMUSCULAR; INTRAVENOUS ONCE
Status: COMPLETED | OUTPATIENT
Start: 2020-07-22 | End: 2020-07-22

## 2020-07-22 RX ORDER — LOSARTAN POTASSIUM 25 MG/1
25 TABLET ORAL DAILY
Status: ON HOLD | COMMUNITY
End: 2020-07-28

## 2020-07-22 RX ORDER — MORPHINE SULFATE 10 MG/ML
6 INJECTION, SOLUTION INTRAMUSCULAR; INTRAVENOUS ONCE
Status: COMPLETED | OUTPATIENT
Start: 2020-07-22 | End: 2020-07-22

## 2020-07-22 RX ORDER — PHENYLEPHRINE HCL IN 0.9% NACL 0.5 MG/5ML
500 SYRINGE (ML) INTRAVENOUS ONCE
Status: COMPLETED | OUTPATIENT
Start: 2020-07-22 | End: 2020-07-22

## 2020-07-22 RX ORDER — CARVEDILOL 25 MG/1
25 TABLET ORAL ONCE
Status: COMPLETED | OUTPATIENT
Start: 2020-07-22 | End: 2020-07-22

## 2020-07-22 RX ORDER — LABETALOL HYDROCHLORIDE 5 MG/ML
10 INJECTION, SOLUTION INTRAVENOUS ONCE
Status: COMPLETED | OUTPATIENT
Start: 2020-07-22 | End: 2020-07-22

## 2020-07-22 RX ORDER — LIDOCAINE HYDROCHLORIDE 20 MG/ML
20 INJECTION, SOLUTION INFILTRATION; PERINEURAL ONCE
Status: COMPLETED | OUTPATIENT
Start: 2020-07-22 | End: 2020-07-22

## 2020-07-22 RX ADMIN — ROCURONIUM BROMIDE 50 MG: 10 INJECTION, SOLUTION INTRAVENOUS at 23:39

## 2020-07-22 RX ADMIN — Medication 500 MCG: at 21:52

## 2020-07-22 RX ADMIN — ONDANSETRON 4 MG: 2 INJECTION INTRAMUSCULAR; INTRAVENOUS at 19:50

## 2020-07-22 RX ADMIN — CARVEDILOL 25 MG: 25 TABLET, FILM COATED ORAL at 22:19

## 2020-07-22 RX ADMIN — MORPHINE SULFATE 6 MG: 10 INJECTION INTRAVENOUS at 19:49

## 2020-07-22 RX ADMIN — LIDOCAINE HYDROCHLORIDE 60 MG: 20 INJECTION, SOLUTION EPIDURAL; INFILTRATION; INTRACAUDAL at 23:39

## 2020-07-22 RX ADMIN — LABETALOL HYDROCHLORIDE 10 MG: 5 INJECTION INTRAVENOUS at 19:49

## 2020-07-22 RX ADMIN — PROPOFOL 180 MG: 10 INJECTION, EMULSION INTRAVENOUS at 23:39

## 2020-07-22 RX ADMIN — CEFAZOLIN 3 G: 330 INJECTION, POWDER, FOR SOLUTION INTRAMUSCULAR; INTRAVENOUS at 23:55

## 2020-07-22 RX ADMIN — FENTANYL CITRATE 100 MCG: 50 INJECTION INTRAMUSCULAR; INTRAVENOUS at 23:36

## 2020-07-22 RX ADMIN — SODIUM CHLORIDE, POTASSIUM CHLORIDE, SODIUM LACTATE AND CALCIUM CHLORIDE: 600; 310; 30; 20 INJECTION, SOLUTION INTRAVENOUS at 23:32

## 2020-07-22 RX ADMIN — LIDOCAINE HYDROCHLORIDE 20 ML: 20 INJECTION, SOLUTION INFILTRATION; PERINEURAL at 21:52

## 2020-07-22 ASSESSMENT — FIBROSIS 4 INDEX: FIB4 SCORE: 0.65

## 2020-07-23 LAB
ALBUMIN SERPL BCP-MCNC: 4.2 G/DL (ref 3.2–4.9)
ALBUMIN/GLOB SERPL: 1.4 G/DL
ALP SERPL-CCNC: 57 U/L (ref 30–99)
ALT SERPL-CCNC: 16 U/L (ref 2–50)
ANION GAP SERPL CALC-SCNC: 10 MMOL/L (ref 7–16)
AST SERPL-CCNC: 19 U/L (ref 12–45)
BASOPHILS # BLD AUTO: 0.1 % (ref 0–1.8)
BASOPHILS # BLD: 0.01 K/UL (ref 0–0.12)
BILIRUB SERPL-MCNC: 0.8 MG/DL (ref 0.1–1.5)
BUN SERPL-MCNC: 25 MG/DL (ref 8–22)
CALCIUM SERPL-MCNC: 9.3 MG/DL (ref 8.5–10.5)
CHLORIDE SERPL-SCNC: 96 MMOL/L (ref 96–112)
CO2 SERPL-SCNC: 25 MMOL/L (ref 20–33)
CREAT SERPL-MCNC: 1.57 MG/DL (ref 0.5–1.4)
EOSINOPHIL # BLD AUTO: 0.01 K/UL (ref 0–0.51)
EOSINOPHIL NFR BLD: 0.1 % (ref 0–6.9)
ERYTHROCYTE [DISTWIDTH] IN BLOOD BY AUTOMATED COUNT: 44.7 FL (ref 35.9–50)
EST. AVERAGE GLUCOSE BLD GHB EST-MCNC: 117 MG/DL
GLOBULIN SER CALC-MCNC: 3.1 G/DL (ref 1.9–3.5)
GLUCOSE BLD-MCNC: 139 MG/DL (ref 65–99)
GLUCOSE SERPL-MCNC: 195 MG/DL (ref 65–99)
HBA1C MFR BLD: 5.7 % (ref 0–5.6)
HCT VFR BLD AUTO: 44.1 % (ref 42–52)
HGB BLD-MCNC: 14.6 G/DL (ref 14–18)
HGB S BLD QL: NEGATIVE
IMM GRANULOCYTES # BLD AUTO: 0.04 K/UL (ref 0–0.11)
IMM GRANULOCYTES NFR BLD AUTO: 0.3 % (ref 0–0.9)
LYMPHOCYTES # BLD AUTO: 0.61 K/UL (ref 1–4.8)
LYMPHOCYTES NFR BLD: 5.3 % (ref 22–41)
MCH RBC QN AUTO: 31.4 PG (ref 27–33)
MCHC RBC AUTO-ENTMCNC: 33.1 G/DL (ref 33.7–35.3)
MCV RBC AUTO: 94.8 FL (ref 81.4–97.8)
MONOCYTES # BLD AUTO: 0.49 K/UL (ref 0–0.85)
MONOCYTES NFR BLD AUTO: 4.2 % (ref 0–13.4)
NEUTROPHILS # BLD AUTO: 10.4 K/UL (ref 1.82–7.42)
NEUTROPHILS NFR BLD: 90 % (ref 44–72)
NRBC # BLD AUTO: 0 K/UL
NRBC BLD-RTO: 0 /100 WBC
PLATELET # BLD AUTO: 158 K/UL (ref 164–446)
PMV BLD AUTO: 11.1 FL (ref 9–12.9)
POTASSIUM SERPL-SCNC: 4.1 MMOL/L (ref 3.6–5.5)
PROT SERPL-MCNC: 7.3 G/DL (ref 6–8.2)
RBC # BLD AUTO: 4.65 M/UL (ref 4.7–6.1)
SARS-COV-2 RNA RESP QL NAA+PROBE: NOTDETECTED
SODIUM SERPL-SCNC: 131 MMOL/L (ref 135–145)
SPECIMEN SOURCE: NORMAL
WBC # BLD AUTO: 11.6 K/UL (ref 4.8–10.8)

## 2020-07-23 PROCEDURE — 700105 HCHG RX REV CODE 258: Performed by: ANESTHESIOLOGY

## 2020-07-23 PROCEDURE — 770020 HCHG ROOM/CARE - TELE (206)

## 2020-07-23 PROCEDURE — 700111 HCHG RX REV CODE 636 W/ 250 OVERRIDE (IP): Performed by: ANESTHESIOLOGY

## 2020-07-23 PROCEDURE — 700102 HCHG RX REV CODE 250 W/ 637 OVERRIDE(OP): Performed by: UROLOGY

## 2020-07-23 PROCEDURE — 36415 COLL VENOUS BLD VENIPUNCTURE: CPT

## 2020-07-23 PROCEDURE — 700111 HCHG RX REV CODE 636 W/ 250 OVERRIDE (IP): Performed by: STUDENT IN AN ORGANIZED HEALTH CARE EDUCATION/TRAINING PROGRAM

## 2020-07-23 PROCEDURE — 80053 COMPREHEN METABOLIC PANEL: CPT

## 2020-07-23 PROCEDURE — A9270 NON-COVERED ITEM OR SERVICE: HCPCS | Performed by: ANESTHESIOLOGY

## 2020-07-23 PROCEDURE — A9270 NON-COVERED ITEM OR SERVICE: HCPCS | Performed by: STUDENT IN AN ORGANIZED HEALTH CARE EDUCATION/TRAINING PROGRAM

## 2020-07-23 PROCEDURE — 700102 HCHG RX REV CODE 250 W/ 637 OVERRIDE(OP): Performed by: ANESTHESIOLOGY

## 2020-07-23 PROCEDURE — 700111 HCHG RX REV CODE 636 W/ 250 OVERRIDE (IP): Performed by: UROLOGY

## 2020-07-23 PROCEDURE — 700101 HCHG RX REV CODE 250: Performed by: ANESTHESIOLOGY

## 2020-07-23 PROCEDURE — 51798 US URINE CAPACITY MEASURE: CPT

## 2020-07-23 PROCEDURE — 700102 HCHG RX REV CODE 250 W/ 637 OVERRIDE(OP): Performed by: STUDENT IN AN ORGANIZED HEALTH CARE EDUCATION/TRAINING PROGRAM

## 2020-07-23 PROCEDURE — 700111 HCHG RX REV CODE 636 W/ 250 OVERRIDE (IP)

## 2020-07-23 PROCEDURE — 99223 1ST HOSP IP/OBS HIGH 75: CPT | Mod: GC | Performed by: HOSPITALIST

## 2020-07-23 PROCEDURE — A9270 NON-COVERED ITEM OR SERVICE: HCPCS | Performed by: UROLOGY

## 2020-07-23 PROCEDURE — 700102 HCHG RX REV CODE 250 W/ 637 OVERRIDE(OP): Performed by: HOSPITALIST

## 2020-07-23 PROCEDURE — 85025 COMPLETE CBC W/AUTO DIFF WBC: CPT

## 2020-07-23 PROCEDURE — 82962 GLUCOSE BLOOD TEST: CPT

## 2020-07-23 PROCEDURE — A9270 NON-COVERED ITEM OR SERVICE: HCPCS | Performed by: HOSPITALIST

## 2020-07-23 PROCEDURE — 83036 HEMOGLOBIN GLYCOSYLATED A1C: CPT

## 2020-07-23 PROCEDURE — 306637 HCHG MISC ORTHO ITEM RC 0274

## 2020-07-23 RX ORDER — PHENYLEPHRINE HYDROCHLORIDE 10 MG/ML
INJECTION, SOLUTION INTRAMUSCULAR; INTRAVENOUS; SUBCUTANEOUS
Status: COMPLETED | OUTPATIENT
Start: 2020-07-23 | End: 2020-07-23

## 2020-07-23 RX ORDER — HYDROMORPHONE HYDROCHLORIDE 1 MG/ML
0.4 INJECTION, SOLUTION INTRAMUSCULAR; INTRAVENOUS; SUBCUTANEOUS
Status: DISCONTINUED | OUTPATIENT
Start: 2020-07-23 | End: 2020-07-23 | Stop reason: HOSPADM

## 2020-07-23 RX ORDER — HALOPERIDOL 5 MG/ML
1 INJECTION INTRAMUSCULAR
Status: DISCONTINUED | OUTPATIENT
Start: 2020-07-23 | End: 2020-07-23 | Stop reason: HOSPADM

## 2020-07-23 RX ORDER — BISACODYL 10 MG
10 SUPPOSITORY, RECTAL RECTAL
Status: DISCONTINUED | OUTPATIENT
Start: 2020-07-23 | End: 2020-07-28

## 2020-07-23 RX ORDER — AMOXICILLIN 250 MG
2 CAPSULE ORAL 2 TIMES DAILY
Status: DISCONTINUED | OUTPATIENT
Start: 2020-07-23 | End: 2020-07-28

## 2020-07-23 RX ORDER — SODIUM CHLORIDE, SODIUM LACTATE, POTASSIUM CHLORIDE, CALCIUM CHLORIDE 600; 310; 30; 20 MG/100ML; MG/100ML; MG/100ML; MG/100ML
INJECTION, SOLUTION INTRAVENOUS
Status: DISCONTINUED | OUTPATIENT
Start: 2020-07-22 | End: 2020-07-23 | Stop reason: SURG

## 2020-07-23 RX ORDER — EPINEPHRINE 1 MG/ML(1)
AMPUL (ML) INJECTION PRN
Status: DISCONTINUED | OUTPATIENT
Start: 2020-07-23 | End: 2020-07-23 | Stop reason: SURG

## 2020-07-23 RX ORDER — OXYCODONE HYDROCHLORIDE 5 MG/1
5 TABLET ORAL EVERY 4 HOURS PRN
Status: DISCONTINUED | OUTPATIENT
Start: 2020-07-23 | End: 2020-07-23

## 2020-07-23 RX ORDER — CEFAZOLIN SODIUM 1 G/3ML
INJECTION, POWDER, FOR SOLUTION INTRAMUSCULAR; INTRAVENOUS PRN
Status: DISCONTINUED | OUTPATIENT
Start: 2020-07-22 | End: 2020-07-23 | Stop reason: SURG

## 2020-07-23 RX ORDER — LOSARTAN POTASSIUM 50 MG/1
50 TABLET ORAL DAILY
Status: DISCONTINUED | OUTPATIENT
Start: 2020-07-24 | End: 2020-07-28

## 2020-07-23 RX ORDER — LABETALOL HYDROCHLORIDE 5 MG/ML
5 INJECTION, SOLUTION INTRAVENOUS
Status: DISCONTINUED | OUTPATIENT
Start: 2020-07-23 | End: 2020-07-23 | Stop reason: HOSPADM

## 2020-07-23 RX ORDER — HYDROMORPHONE HYDROCHLORIDE 2 MG/1
1 TABLET ORAL EVERY 6 HOURS PRN
Status: DISCONTINUED | OUTPATIENT
Start: 2020-07-23 | End: 2020-07-24

## 2020-07-23 RX ORDER — ONDANSETRON 2 MG/ML
4 INJECTION INTRAMUSCULAR; INTRAVENOUS
Status: COMPLETED | OUTPATIENT
Start: 2020-07-23 | End: 2020-07-23

## 2020-07-23 RX ORDER — DIPHENHYDRAMINE HYDROCHLORIDE 50 MG/ML
12.5 INJECTION INTRAMUSCULAR; INTRAVENOUS
Status: DISCONTINUED | OUTPATIENT
Start: 2020-07-23 | End: 2020-07-23 | Stop reason: HOSPADM

## 2020-07-23 RX ORDER — ATORVASTATIN CALCIUM 20 MG/1
80 TABLET, FILM COATED ORAL
Status: DISCONTINUED | OUTPATIENT
Start: 2020-07-23 | End: 2020-07-28 | Stop reason: HOSPADM

## 2020-07-23 RX ORDER — SODIUM CHLORIDE, SODIUM LACTATE, POTASSIUM CHLORIDE, CALCIUM CHLORIDE 600; 310; 30; 20 MG/100ML; MG/100ML; MG/100ML; MG/100ML
INJECTION, SOLUTION INTRAVENOUS CONTINUOUS
Status: DISCONTINUED | OUTPATIENT
Start: 2020-07-23 | End: 2020-07-23 | Stop reason: HOSPADM

## 2020-07-23 RX ORDER — BUPIVACAINE HYDROCHLORIDE 2.5 MG/ML
INJECTION, SOLUTION EPIDURAL; INFILTRATION; INTRACAUDAL
Status: DISCONTINUED | OUTPATIENT
Start: 2020-07-23 | End: 2020-07-23 | Stop reason: HOSPADM

## 2020-07-23 RX ORDER — POLYETHYLENE GLYCOL 3350 17 G/17G
1 POWDER, FOR SOLUTION ORAL
Status: DISCONTINUED | OUTPATIENT
Start: 2020-07-23 | End: 2020-07-28

## 2020-07-23 RX ORDER — OXYCODONE HYDROCHLORIDE 10 MG/1
10 TABLET ORAL EVERY 4 HOURS PRN
Status: DISCONTINUED | OUTPATIENT
Start: 2020-07-23 | End: 2020-07-23

## 2020-07-23 RX ORDER — HALOPERIDOL 5 MG/ML
INJECTION INTRAMUSCULAR
Status: COMPLETED
Start: 2020-07-23 | End: 2020-07-23

## 2020-07-23 RX ORDER — LIDOCAINE HYDROCHLORIDE 20 MG/ML
INJECTION, SOLUTION EPIDURAL; INFILTRATION; INTRACAUDAL; PERINEURAL PRN
Status: DISCONTINUED | OUTPATIENT
Start: 2020-07-22 | End: 2020-07-23 | Stop reason: SURG

## 2020-07-23 RX ORDER — HYDROMORPHONE HYDROCHLORIDE 1 MG/ML
0.1 INJECTION, SOLUTION INTRAMUSCULAR; INTRAVENOUS; SUBCUTANEOUS
Status: DISCONTINUED | OUTPATIENT
Start: 2020-07-23 | End: 2020-07-23 | Stop reason: HOSPADM

## 2020-07-23 RX ORDER — CARVEDILOL 25 MG/1
25 TABLET ORAL 2 TIMES DAILY WITH MEALS
Status: DISCONTINUED | OUTPATIENT
Start: 2020-07-23 | End: 2020-07-28 | Stop reason: HOSPADM

## 2020-07-23 RX ORDER — ONDANSETRON 2 MG/ML
4 INJECTION INTRAMUSCULAR; INTRAVENOUS EVERY 4 HOURS PRN
Status: DISCONTINUED | OUTPATIENT
Start: 2020-07-23 | End: 2020-07-28

## 2020-07-23 RX ORDER — HYDROMORPHONE HYDROCHLORIDE 1 MG/ML
0.2 INJECTION, SOLUTION INTRAMUSCULAR; INTRAVENOUS; SUBCUTANEOUS
Status: DISCONTINUED | OUTPATIENT
Start: 2020-07-23 | End: 2020-07-23 | Stop reason: HOSPADM

## 2020-07-23 RX ORDER — OXYCODONE HCL 5 MG/5 ML
10 SOLUTION, ORAL ORAL
Status: COMPLETED | OUTPATIENT
Start: 2020-07-23 | End: 2020-07-23

## 2020-07-23 RX ORDER — OXYCODONE HCL 5 MG/5 ML
5 SOLUTION, ORAL ORAL
Status: COMPLETED | OUTPATIENT
Start: 2020-07-23 | End: 2020-07-23

## 2020-07-23 RX ORDER — ONDANSETRON 2 MG/ML
INJECTION INTRAMUSCULAR; INTRAVENOUS PRN
Status: DISCONTINUED | OUTPATIENT
Start: 2020-07-23 | End: 2020-07-23 | Stop reason: SURG

## 2020-07-23 RX ORDER — HYDRALAZINE HYDROCHLORIDE 20 MG/ML
5 INJECTION INTRAMUSCULAR; INTRAVENOUS
Status: DISCONTINUED | OUTPATIENT
Start: 2020-07-23 | End: 2020-07-23 | Stop reason: HOSPADM

## 2020-07-23 RX ORDER — DEXAMETHASONE SODIUM PHOSPHATE 4 MG/ML
INJECTION, SOLUTION INTRA-ARTICULAR; INTRALESIONAL; INTRAMUSCULAR; INTRAVENOUS; SOFT TISSUE PRN
Status: DISCONTINUED | OUTPATIENT
Start: 2020-07-23 | End: 2020-07-23 | Stop reason: SURG

## 2020-07-23 RX ORDER — LOSARTAN POTASSIUM 25 MG/1
25 TABLET ORAL DAILY
Status: DISCONTINUED | OUTPATIENT
Start: 2020-07-23 | End: 2020-07-23

## 2020-07-23 RX ADMIN — EPHEDRINE SULFATE 5 MG: 50 INJECTION, SOLUTION INTRAVENOUS at 00:18

## 2020-07-23 RX ADMIN — EPHEDRINE SULFATE 5 MG: 50 INJECTION, SOLUTION INTRAVENOUS at 00:11

## 2020-07-23 RX ADMIN — EPINEPHRINE 25 MCG: 1 INJECTION INTRAMUSCULAR; INTRAVENOUS; SUBCUTANEOUS at 00:20

## 2020-07-23 RX ADMIN — ASPIRIN 81 MG: 81 TABLET, COATED ORAL at 09:05

## 2020-07-23 RX ADMIN — HALOPERIDOL LACTATE 1 MG: 5 INJECTION, SOLUTION INTRAMUSCULAR at 01:28

## 2020-07-23 RX ADMIN — LOSARTAN POTASSIUM 25 MG: 25 TABLET, FILM COATED ORAL at 09:06

## 2020-07-23 RX ADMIN — EPHEDRINE SULFATE 5 MG: 50 INJECTION, SOLUTION INTRAVENOUS at 00:13

## 2020-07-23 RX ADMIN — ONDANSETRON 4 MG: 2 INJECTION INTRAMUSCULAR; INTRAVENOUS at 08:49

## 2020-07-23 RX ADMIN — DEXAMETHASONE SODIUM PHOSPHATE 8 MG: 4 INJECTION, SOLUTION INTRA-ARTICULAR; INTRALESIONAL; INTRAMUSCULAR; INTRAVENOUS; SOFT TISSUE at 00:35

## 2020-07-23 RX ADMIN — OXYCODONE HYDROCHLORIDE 5 MG: 5 TABLET ORAL at 13:42

## 2020-07-23 RX ADMIN — EPHEDRINE SULFATE 5 MG: 50 INJECTION, SOLUTION INTRAVENOUS at 00:21

## 2020-07-23 RX ADMIN — ONDANSETRON 4 MG: 2 INJECTION INTRAMUSCULAR; INTRAVENOUS at 18:29

## 2020-07-23 RX ADMIN — HYDROMORPHONE HYDROCHLORIDE 1 MG: 2 TABLET ORAL at 22:44

## 2020-07-23 RX ADMIN — EPHEDRINE SULFATE 5 MG: 50 INJECTION, SOLUTION INTRAVENOUS at 00:05

## 2020-07-23 RX ADMIN — HALOPERIDOL 1 MG: 5 INJECTION INTRAMUSCULAR at 01:28

## 2020-07-23 RX ADMIN — OXYCODONE HYDROCHLORIDE 5 MG: 5 TABLET ORAL at 17:54

## 2020-07-23 RX ADMIN — FENTANYL CITRATE 50 MCG: 50 INJECTION INTRAMUSCULAR; INTRAVENOUS at 01:01

## 2020-07-23 RX ADMIN — EPHEDRINE SULFATE 5 MG: 50 INJECTION, SOLUTION INTRAVENOUS at 00:08

## 2020-07-23 RX ADMIN — SODIUM CHLORIDE, POTASSIUM CHLORIDE, SODIUM LACTATE AND CALCIUM CHLORIDE: 600; 310; 30; 20 INJECTION, SOLUTION INTRAVENOUS at 01:47

## 2020-07-23 RX ADMIN — DOCUSATE SODIUM 50 MG AND SENNOSIDES 8.6 MG 2 TABLET: 8.6; 5 TABLET, FILM COATED ORAL at 09:06

## 2020-07-23 RX ADMIN — EPHEDRINE SULFATE 5 MG: 50 INJECTION, SOLUTION INTRAVENOUS at 00:02

## 2020-07-23 RX ADMIN — FENTANYL CITRATE 50 MCG: 50 INJECTION INTRAMUSCULAR; INTRAVENOUS at 08:12

## 2020-07-23 RX ADMIN — SUGAMMADEX 200 MG: 100 INJECTION, SOLUTION INTRAVENOUS at 01:01

## 2020-07-23 RX ADMIN — ONDANSETRON 4 MG: 2 INJECTION INTRAMUSCULAR; INTRAVENOUS at 01:01

## 2020-07-23 RX ADMIN — OXYCODONE HYDROCHLORIDE 5 MG: 5 SOLUTION ORAL at 01:42

## 2020-07-23 RX ADMIN — ATORVASTATIN CALCIUM 80 MG: 80 TABLET, FILM COATED ORAL at 20:21

## 2020-07-23 RX ADMIN — SODIUM CHLORIDE, POTASSIUM CHLORIDE, SODIUM LACTATE AND CALCIUM CHLORIDE: 600; 310; 30; 20 INJECTION, SOLUTION INTRAVENOUS at 01:01

## 2020-07-23 RX ADMIN — FENTANYL CITRATE 50 MCG: 50 INJECTION INTRAMUSCULAR; INTRAVENOUS at 08:17

## 2020-07-23 RX ADMIN — CARVEDILOL 25 MG: 25 TABLET, FILM COATED ORAL at 17:53

## 2020-07-23 RX ADMIN — FENTANYL CITRATE 25 MCG: 50 INJECTION INTRAMUSCULAR; INTRAVENOUS at 01:45

## 2020-07-23 RX ADMIN — EPINEPHRINE 25 MCG: 1 INJECTION INTRAMUSCULAR; INTRAVENOUS; SUBCUTANEOUS at 00:29

## 2020-07-23 RX ADMIN — HYDRALAZINE HYDROCHLORIDE 5 MG: 20 INJECTION INTRAMUSCULAR; INTRAVENOUS at 08:30

## 2020-07-23 RX ADMIN — CARVEDILOL 25 MG: 25 TABLET, FILM COATED ORAL at 09:06

## 2020-07-23 RX ADMIN — EPINEPHRINE 25 MCG: 1 INJECTION INTRAMUSCULAR; INTRAVENOUS; SUBCUTANEOUS at 00:32

## 2020-07-23 ASSESSMENT — PATIENT HEALTH QUESTIONNAIRE - PHQ9
SUM OF ALL RESPONSES TO PHQ9 QUESTIONS 1 AND 2: 0
1. LITTLE INTEREST OR PLEASURE IN DOING THINGS: NOT AT ALL
2. FEELING DOWN, DEPRESSED, IRRITABLE, OR HOPELESS: NOT AT ALL

## 2020-07-23 ASSESSMENT — COGNITIVE AND FUNCTIONAL STATUS - GENERAL
DAILY ACTIVITIY SCORE: 24
SUGGESTED CMS G CODE MODIFIER MOBILITY: CH
MOBILITY SCORE: 24
SUGGESTED CMS G CODE MODIFIER DAILY ACTIVITY: CH

## 2020-07-23 ASSESSMENT — ENCOUNTER SYMPTOMS
HEADACHES: 1
VOMITING: 0
BLOOD IN STOOL: 0
CONSTIPATION: 1
FEVER: 0
NAUSEA: 0
NECK PAIN: 0
BLURRED VISION: 0
HEMOPTYSIS: 0
DIZZINESS: 0
ABDOMINAL PAIN: 0
MYALGIAS: 0
DOUBLE VISION: 0
COUGH: 0
ORTHOPNEA: 0
PALPITATIONS: 0
TINGLING: 0
CHILLS: 0
HEARTBURN: 0

## 2020-07-23 ASSESSMENT — LIFESTYLE VARIABLES
EVER FELT BAD OR GUILTY ABOUT YOUR DRINKING: NO
TOTAL SCORE: 0
TOTAL SCORE: 0
ALCOHOL_USE: YES
AVERAGE NUMBER OF DAYS PER WEEK YOU HAVE A DRINK CONTAINING ALCOHOL: 1
EVER HAD A DRINK FIRST THING IN THE MORNING TO STEADY YOUR NERVES TO GET RID OF A HANGOVER: NO
HAVE YOU EVER FELT YOU SHOULD CUT DOWN ON YOUR DRINKING: NO
HAVE PEOPLE ANNOYED YOU BY CRITICIZING YOUR DRINKING: NO
EVER_SMOKED: NEVER
SUBSTANCE_ABUSE: 0
ON A TYPICAL DAY WHEN YOU DRINK ALCOHOL HOW MANY DRINKS DO YOU HAVE: 1
CONSUMPTION TOTAL: NEGATIVE
EVER_SMOKED: NEVER
TOTAL SCORE: 0
DOES PATIENT WANT TO STOP DRINKING: NO
HOW MANY TIMES IN THE PAST YEAR HAVE YOU HAD 5 OR MORE DRINKS IN A DAY: 0

## 2020-07-23 ASSESSMENT — COPD QUESTIONNAIRES
HAVE YOU SMOKED AT LEAST 100 CIGARETTES IN YOUR ENTIRE LIFE: NO/DON'T KNOW
DO YOU EVER COUGH UP ANY MUCUS OR PHLEGM?: NO/ONLY WITH OCCASIONAL COLDS OR INFECTIONS
DURING THE PAST 4 WEEKS HOW MUCH DID YOU FEEL SHORT OF BREATH: NONE/LITTLE OF THE TIME
COPD SCREENING SCORE: 2
IN THE PAST 12 MONTHS DO YOU DO LESS THAN YOU USED TO BECAUSE OF YOUR BREATHING PROBLEMS: DISAGREE/UNSURE

## 2020-07-23 NOTE — OR NURSING
0117 Pt from OR, awake, with O2 support of 5 L/min via mask, respirations regular and spontaneous, vital signs within normal limits. Pt penis with dressing, noted some sero sang drainage (which is to be expected and normal), with lamar attached to urine bag, clear yellow, blood tinged urine noted. SCDs ON, gurney set to lowest point and secured.    0128 Diaphoresis noted, POC = 139 mg/dl. Pt also states nausea, medicated per MAR.    0130 Dr. Gomez at bedside, pt NPO for the rest of the night starting at the room.    0141 Pt verbalized burning because of the catheter, pain medication given per MAR.    0200 Wound assessment, still noted some bleeding. NPO started.    0216 Recovery complete. Pt comfortably sleeping.    0230 Vital signs stable, vital signs monitoring Q30 mins, awaiting bed assignment.    0240 Pt transferred to a bed.    0330 Wound assessment, still noted some bleeding.    0400 Vital signs stable, still awaiting bed assignment, vital signs monitoring to Q1 hr.    0455 Left voice message to phlebotomist Re: scheduled lab draws.    0635 Phlebotomist at bedside for blood draw.    0710 Gave report to Tawny LISA.

## 2020-07-23 NOTE — ANESTHESIA PREPROCEDURE EVALUATION
Relevant Problems   CARDIAC   (+) Ascending aorta dilatation ([facility])   (+) Coronary artery disease involving native coronary artery with unstable angina pectoris ([facility])   (+) Essential hypertension   (+) Essential hypertension, benign   (+) NSTEMI (non-ST elevated myocardial infarction) ([facility])   (+) Premature atrial complex         (+) CRISELDA (acute kidney injury) ([facility])   (+) Kidney stone   (+) Renal cyst   (+) Renal insufficiency syndrome       Physical Exam    Airway   Mallampati: II  TM distance: >3 FB  Neck ROM: full       Cardiovascular - normal exam  Rhythm: regular  Rate: normal  (-) murmur     Dental - normal exam           Pulmonary - normal exam  Breath sounds clear to auscultation     Abdominal    Neurological - normal exam                 Anesthesia Plan    ASA 3- EMERGENT   ASA physical status 3 criteria: diabetes - poorly controlled, hypertension - poorly controlled and CAD/stents (> 3 months)ASA physical status emergent criteria: compromised vital organ, limb or tissue    Plan - general       Airway plan will be ETT        Induction: intravenous    Postoperative Plan: Postoperative administration of opioids is intended.    Pertinent diagnostic labs and testing reviewed    Informed Consent:    Anesthetic plan and risks discussed with patient.    Use of blood products discussed with: patient whom consented to blood products.

## 2020-07-23 NOTE — ED NOTES
ERP and RN at bedside for penile aspiration for priapism. Medications administered by ERP and patient tolerated procedure well.

## 2020-07-23 NOTE — OR SURGEON
Immediate Post OP Note    PreOp Diagnosis: Priapism Low flow over 36 hours    PostOp Diagnosis: As above    Procedure(s):  Corporal Aspiration and Irrigation of priapism with saline and phenylephrine Ndtnvmil-ftxmckxskc-pwwmyzhb glandular shunt with Snake procedure - Wound Class: Clean    Surgeon(s):  Homer Gomez M.D.    Anesthesiologist/Type of Anesthesia:  Anesthesiologist: Ramon Sawyer M.D.; Leticia Posada M.D./General ETT    Surgical Staff:  Circulator: Marcy Cowan R.N.  Scrub Person: Leopold Von C Garcia    Specimens removed if any:None    Estimated Blood Loss: 400ml    Findings: Fibrotic corporal bodies    Complications: None  Drains: 16 Greek lamar to gravity        7/23/2020 1:14 AM Homer Gomez M.D.

## 2020-07-23 NOTE — CONSULTS
DATE OF SERVICE:  07/22/2020    UROLOGIC CONSULTATION    CHIEF COMPLAINT:  This is a 60-year-old gentleman with a 36-hour priapism.    CONSULTATION REQUESTED BY:  Yohannes Webb MD,  emergency room physician.    HISTORY OF PRESENT ILLNESS:  The patient states he was in his usual state of   health until about 3:00 in the afternoon on the date of admission when he   developed chest pain.  He has a prior history of atherosclerotic coronary   artery disease status post stent placements and was evaluated in the emergency   room, found to have hypertension, but no obvious evidence of acute myocardial   infarction.  The patient, however, was noted later to have a significant   36-hour priapism.  Dr. Webb performed a blood gas showing a low flow   ischemic priapism.  I attempted aspiration and irrigation, which failed and   the patient was seen in consultation in the emergency room.  He denies taking   any trazodone, stimulants or any oral therapies to help with erections and   states he just woke up with a morning erection has been present for 36 hours.    He denies pain.  He denies a prior history of priapism.    PAST MEDICAL HISTORY:  Noteworthy for diabetes mellitus, hypertension and   atherosclerotic coronary artery disease.    PAST SURGICAL HISTORY:  Noteworthy for gallbladder removal, cardiac surgery on   01/10/2020 and a ligament repair.    MEDICATIONS:  In an outpatient setting includes enteric-coated aspirin,   Effient, nitroglycerin, metformin, losartan, carvedilol and atorvastatin.    ALLERGIES:  TO LEVOFLOXACIN WHERE HE DEVELOPED GI BLEEDING.    FAMILY HISTORY:  Noncontributory, but negative for genitourinary malignancy.    SOCIAL HISTORY:  He denies tobacco, alcohol.  He smokes marijuana   occasionally.  Denies utilizing any intravenous drugs.  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    REVIEW OF SYSTEMS:  GENERAL:  He denies any fever, chills.  He denies any recent weight  loss or   weight gain.  HEENT:  Denies any hearing loss, visual changes or difficulty swallowing.  RESPIRATORY:  Denies any cough, hemoptysis or wheezing.  CARDIOVASCULAR:  He does have a history of chest pain, 3/10 in severity, but   denies any skipped heartbeats or dyspnea on exertion.  GASTROINTESTINAL:  He has constipation, but denies abdominal pain, blood in   the stool, nausea or vomiting.  GENITOURINARY:  He has mild dysuria for 36 hours with the priapism.  He denies   any hematuria, urgency, frequency.  MUSCULOSKELETAL:  Denies myalgias, arthralgias or neck pain.  NEUROLOGIC:  He has had some headache.  Denies any dizziness or seizure.  PSYCHIATRIC:  Denies any anxiety or depression.  ENDOCRINE:  Denies excessive feeling of heat or cold intolerance or polyuria.    PHYSICAL EXAMINATION:  GENERAL:  He is a well-developed, well-nourished male in no acute distress.  HEENT:  Normocephalic, atraumatic.  Pupils equal, round.  Sclerae nonicteric.  NECK:  Supple, without adenopathy.  I did not appreciate any jugular venous   distention.  CHEST:  Clear bilaterally without any wheezing.  CARDIOVASCULAR:  Regular rate and rhythm without murmur.  ABDOMEN:  Soft, nontender, nondistended.  He had normoactive bowel sounds.  GENITOURINARY:  Shows an indurated phallus with the priapism.  Testes   descended.  He is circumcised.  There is some ecchymosis on the left proximal   corporal body from an aspiration, irrigation attempts by the emergency room   physician.  Testes descended without mass.  Digital rectal exam is deferred.  MUSCULOSKELETAL:  No evidence of weakness.  No clubbing, cyanosis or edema.  NEUROLOGICAL:  Cranial nerves II to XII intact.  Motor and sensory examination   nonfocal.  PSYCHIATRIC:  Appropriate mood and judgment.    ASSESSMENT:  Priapism for 36 hours.    PLAN AND RECOMMENDATIONS:  I explained to the patient that it looks like his   cardiac issues are relatively minor probably hypertension induced, but  he   needs emergent surgery.  I recommended going to the operating room with   attempted repeat aspiration irrigation; if this fails, we will proceed to a   corporal cavernosa with corporoglandular shunt.  During the consultation, I   made it very clear that due to the duration of the erection at 36 hours in all   likelihood he is going to develop fibrotic corporal bodies if he does not   already have them and erectile dysfunction is the norm in this setting.  I   explained to him that the erectile dysfunction is a result of the low flow   ischemic priapism and a complication of disease and failure to have treatment   in earlier timeframe.  We did discuss that with the surgery, the risk   including but not limited to risk of wound infection, bleeding, potential need   for repeat surgery, potential risk of urethrocutaneous fistula and he is   aware of the risk of significant bruising of the entire phallus given the fact   that he is on an antiplatelet medication requiring penile operation.  In   addition, we discussed the perioperative risk of deep vein thrombosis,   pulmonary embolism, aspiration pneumonia, heart attack, stroke and death.    Informed consent was given to me by the patient to proceed to surgery and this   has been signed and on the record prior to proceeding.       ____________________________________     MD RO Young / RUTH    DD:  07/23/2020 13:01:51  DT:  07/23/2020 16:34:22    D#:  [id number]  Job#:  [id number]

## 2020-07-23 NOTE — ED TRIAGE NOTES
Chief Complaint   Patient presents with   • Chest Pain     Started 11am this morning, right of sternum, History of NSTEMI in January 2020, two stents placed. HTN in 200's, equal on both arms.     Patient BIB EMS for the above complaint complaining of 3/10 chest pain onset at 11am and has persisted throughout th day. 12 lead in the field was negative for STEMI. The patient was given 324mg ASA and two 0.4mg Nitro which brought his pain down to 1/10.  Re-evaluation today has revealed a SBP difference of 30 points from left arm to right arm. ERP made aware, orders placed.    IV placed, on the monitor, and labs sent.

## 2020-07-23 NOTE — ANESTHESIA QCDR
2019 [facility] Clinical Data Registry (for Quality Improvement)     Postoperative nausea/vomiting risk protocol (Adult = 18 yrs and Pediatric 3-17 yrs)- (430 and 463)  General inhalation anesthetic (NOT TIVA) with PONV risk factors: Yes  Provision of anti-emetic therapy with at least 2 different classes of agents: Yes   Patient DID NOT receive anti-emetic therapy and reason is documented in Medical Record:  N/A    Multimodal Pain Management- (477)  Non-emergent surgery AND patient age >= 18: No  Use of Multimodal Pain Management, two or more drugs and/or interventions, NOT including systemic opioids:   Exception: Documented allergy to multiple classes of analgesics:     Smoking Abstinence (404)  Patient is current smoker (cigarette, pipe, e-cig, marijuanna): No  Elective Surgery:   Abstinence instructions provided prior to day of surgery:   Patient abstained from smoking on day of surgery:     Pre-Op Beta-Blocker in Isolated CABG (44)  Isolated CABG AND patient age >= 18: No  Beta-blocker admin within 24 hours of surgical incision:   Exception:of medical reason(s) for not administering beta blocker within 24 hours prior to surgical incision (e.g., not  indicated,other medical reason):     PACU assessment of acute postoperative pain prior to Anesthesia Care End- Applies to Patients Age = 18- (ABG7)  Initial PACU pain score is which of the following: < 7/10  Patient unable to report pain score: N/A    Post-anesthetic transfer of care checklist/protocol to PACU/ICU- (426 and 427)  Upon conclusion of case, patient transferred to which of the following locations: PACU/Non-ICU  Use of transfer checklist/protocol: Yes  Exclusion: Service Performed in Patient Hospital Room (and thus did not require transfer): N/A  Unplanned admission to ICU related to anesthesia service up through end of PACU care- (MD51)  Unplanned admission to ICU (not initially anticipated at anesthesia start time): No

## 2020-07-23 NOTE — ED NOTES
Med rec updated and complete.  Pt denies antibiotic use in last 14 days.      Home pharmacy [facility].

## 2020-07-23 NOTE — ED PROVIDER NOTES
"ED Provider Note    Scribed for Yohannes Webb M.D. by Yohannes Webb M.D.. 7/22/2020,  7:16 PM.    CHIEF COMPLAINT  Chief Complaint   Patient presents with   • Chest Pain     Started 11am this morning, right of sternum, History of NSTEMI in January 2020, two stents placed. HTN in 200's, equal on both arms.       HPI  Conner Fox is a 60 y.o. male who presents to the Emergency complaining of \"crushing\" chest pain, nonradiating, located to the right of the sternum.  He is concerned because of a history of non-ST elevation MI, and got 2 stents in this hospital in January.  He presents with blood pressures over 200.  He reports compliance with his blood pressure medications at home.  He denies fevers or chills, cough, nausea or vomiting, shortness of breath, orthopnea, or exertional dyspnea.  He says he got a shingles and tetanus shot 2 days ago with a transient fever, which resolved more than 24 hours ago.  He received full-strength aspirin and 2 nitroglycerin tabs in the field, in route with EMS, which reduced his pain from moderate to mild.  He incidentally reports priapism for approximately 30 hours.  He has no history of this.  He denies recreational drugs, prescription stimulants, or erectile dysfunction medications.    REVIEW OF SYSTEMS  See HPI for further details. All other systems are negative.     PAST MEDICAL HISTORY   has a past medical history of Hypertension.    SOCIAL HISTORY  Social History     Tobacco Use   • Smoking status: Never Smoker   • Smokeless tobacco: Never Used   Substance and Sexual Activity   • Alcohol use: Not Currently   • Drug use: Never   • Sexual activity: Yes     Partners: Female     Social History     Substance and Sexual Activity   Drug Use Never       SURGICAL HISTORY   has a past surgical history that includes other cardiac surgery (01/10/2020).    CURRENT MEDICATIONS  Home Medications    **Home medications have not yet been reviewed for this encounter**   " "      ALLERGIES  Allergies   Allergen Reactions   • Levofloxacin Unspecified     GI Bleeding         PHYSICAL EXAM  VITAL SIGNS: BP (!) 204/106   Pulse 75   Temp 36.9 °C (98.5 °F)   Resp 18   Ht 1.778 m (5' 10\")   Wt 99.8 kg (220 lb)   SpO2 99%   BMI 31.57 kg/m²   Pulse ox interpretation: I interpret this pulse ox as normal.  Constitutional: Alert in no apparent distress.  HENT: No signs of trauma, Bilateral external ears normal, Nose normal.   Eyes: Conjunctiva normal, Non-icteric.   Neck: Normal range of motion, Supple, No stridor.   Lymphatic: No lymphadenopathy noted.   Cardiovascular: Regular rate and rhythm, no murmurs.   Thorax & Lungs: Normal breath sounds, No respiratory distress, No wheezing, No chest tenderness.   Abdomen: Bowel sounds normal, Soft, No tenderness, No masses, No pulsatile masses. No peritoneal signs.  : Priapism with otherwise normal appearance of the penis  Skin: Warm, Dry, No erythema, No rash.   Back: No midline bony tenderness.  Extremities: Intact distal pulses, No edema, No cyanosis.  Musculoskeletal: Good range of motion in all major joints. No or major deformities noted.   Neurologic: Alert , Normal motor function, Normal sensory function, No focal deficits noted.   Psychiatric: Affect unusual, slightly physically agitated, Judgment normal, Mood normal.     DIAGNOSTIC STUDIES / PROCEDURES    EKG  Interpreted by me    Rhythm:  Normal sinus rhythm   Rate: 80  SINUS RHYTHM  LEFT ANTERIOR FASCICULAR BLOCK  BORDERLINE PROLONGED QT INTERVAL  Compared to ECG 02/20/2020 10:36:48  Left anterior fascicular block now present  Sinus bradycardia no longer present  Left-axis deviation no longer present  ST (T wave) deviation no longer present    LABS  Labs Reviewed   CBC WITH DIFFERENTIAL   TROPONIN   BASIC METABOLIC PANEL   PROBRAIN NATRIURETIC PEPTIDE, NT   TROPONIN     All labs reviewed by me.    RADIOLOGY  DX-CHEST-PORTABLE (1 VIEW)    (Results Pending)     The radiologist's " "interpretation of all radiological studies have been reviewed by me.    Penile Aspiration for Priapism Procedure Note    Indication: Priapism     Procedure: The patient was positioned appropriately and the skin over the incision site was prepped with alcohol. Local anesthesia was obtained by infiltration using local injections of 2% lidocaine without epinephrine, to the bilateral sides of the corpus cavernosum, approximately MCFP down the shaft of the penis.  An 18-gauge needle was then used to penetrate into the corpus cavernosum, sequentially, bilaterally, and approximately 15 cc of dark red blood was aspirated from each side.  Small amounts of normal saline were irrigated via the same 18-gauge needle.  300 mcg of epinephrine was instilled into each puncture site.  There was temporary improvement, ultimately, the priapism returned to baseline.  Urology was consulted for operative intervention.    The patient tolerated the procedure well.    Complications: None      COURSE & MEDICAL DECISION MAKING  Nursing notes, VS, PMSFHx reviewed in chart.     7:16 PM Patient seen and examined at bedside. Differential diagnosis includes but is not limited to acute coronary syndrome, pneumonia, less likely aortic pathology, pleuritic pain, priapism due to recreational prescription drugs, coagulopathic state, idiopathic. Ordered for ACS rule out protocol labs and chest x-ray, EKG, and cardiac monitoring to evaluate. Patient will be treated with intervention for his priapism, now that his chest pain symptoms have gone to 1-3 out of 10.  He will be treated with morphine for pain.    8:16 PM Abnormal kidney function numbers are consistent with pt's baseline. BNP is quite elevated but still is the lowest of his 3 values in our system. His troponin is just above normal. Repeat troponin will be drawn 2 hours from first.     8:29 PM 350cc of urine drained by mini cath, with improvement in low abdomen pressure from \"9/10\" to zero. "     9:02 PM despite medications for pain and blood pressure, and emptying the patient's bladder, patient still has priapism.  This has been almost 36 hours.  Due to the duration of priapism, I have paged urology.    10:03 PM Aspiration as above.  There is no evidence at this point of acute coronary syndrome or dangerous cause of the patient's chest pain, which remains nearly resolved. However, his very slight troponin elevation may be from b cardiac strain, consistent with hypertensive emergency, but could also be from his chronic kidney disease and poor clearance.    10:27 PM despite temporary partial improvement, the patient's priapism has returned completely to previous baseline.  He has had no improvement, ultimately.  I have discussed this with Dr. Gomez.  The patient's blood gas shows a low flow condition.  Dr. Gomez will take the patient to the operating room.  He will need to be admitted for blood pressure management, and the balance of an ACS rule out.    DISPOSITION:  Patient will be admitted to the hospitalist service, after operative intervention with urology,  in stable condition.      FINAL IMPRESSION  1.  Priapism  2.  Chest pain  3. Hypertensive emergency

## 2020-07-23 NOTE — H&P
"History & Physical Note    Date of Admission: 7/23/2020  Admission Status: Inpatient  [facility] Team: Hansel  Attending: Antwan  Senior Resident: Dr. Mccurdy  Intern: Dr. Saba  Contact Number: [phone]    Chief Complaint: Chest pain     History of Present Illness (HPI): Conner is a 60 y.o. male with a past medical history that includes unstable angina, prior NSTEMI, Htn, history of elevated A1C, and BPH who presented 7/22/2020 with chest pain and priapism. Per the patient the priapism began 36 hours prior to presentation. He does not know of any instigating factors. He then noticed at 3PM of day of admission that he had developed 3/10 persistent sharp, non radiating, and dull chest pain on the right side of his chest. He says this is unlike the usual anginal pain he has in that his usual pain is more midline.  Avoiding exertion helped with the pain. He avoided taking nitro for the pain, stating \"I knew if I took that I would have to go to the ED\". Patient makes a point to mention that he received tetanus and shingles vaccines 3 days prior to admission, and that he developed fevers the night following their administration.     Review of Systems: .  Review of Systems   Constitutional: Negative for chills and fever.   HENT: Negative for hearing loss and tinnitus.    Eyes: Negative for blurred vision and double vision.   Respiratory: Negative for cough and hemoptysis.    Cardiovascular: Positive for chest pain. Negative for palpitations and orthopnea.   Gastrointestinal: Positive for constipation. Negative for abdominal pain, blood in stool, heartburn, nausea and vomiting.   Genitourinary: Positive for dysuria. Negative for frequency, hematuria and urgency.   Musculoskeletal: Negative for myalgias and neck pain.   Neurological: Positive for headaches. Negative for dizziness and tingling.   Psychiatric/Behavioral: Negative for substance abuse.         Past Medical History:   Past Medical History was reviewed with " patient.   has a past medical history of CAD (coronary artery disease), Diabetes (HCC), and Hypertension.    Past Surgical History: Past Surgical History was reviewed with patient.   has a past surgical history that includes other cardiac surgery (01/10/2020)., ligament repair, gallbladder removal.    Medications: Medications have been reviewed  with patient.  Prior to Admission Medications   Prescriptions Last Dose Informant Patient Reported? Taking?   aspirin EC (ECOTRIN) 81 MG Tablet Delayed Response 7/22/2020 at 0930 Patient Yes No   Sig: Take 81 mg by mouth every day.   atorvastatin (LIPITOR) 80 MG tablet 7/21/2020 at 1900 Patient Yes No   Sig: Take 80 mg by mouth every evening.   carvedilol (COREG) 25 MG Tab 7/22/2020 at 0930 Patient Yes No   Sig: Take 25 mg by mouth 2 times a day, with meals.   losartan (COZAAR) 25 MG Tab 7/22/2020 at 0930 Patient Yes Yes   Sig: Take 25 mg by mouth every day.   metFORMIN (GLUCOPHAGE) 500 MG Tab 7/22/2020 at 0930 Patient Yes No   Sig: Take 500 mg by mouth 2 times a day, with meals.   nitroglycerin (NITROSTAT) 0.4 MG SL Tab 7/22/2020 at 1730 Patient No No   Sig: Place 1 Tab under tongue as needed for Chest Pain (Take 5 minutes apart, if after 2nd dose you still have CP take a 3rd and call 911.).   prasugrel (EFFIENT) 10 MG Tab 7/22/2020 at 0930 Patient Yes No   Sig: Take 10 mg by mouth every day.      Facility-Administered Medications: None        Allergies: Allergies have been reviewed with patient.  Allergies   Allergen Reactions   • Levofloxacin Unspecified     GI Bleeding         Family History: Reviewed  family history is not on file.     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  Physical Exam:   Vitals:  Temp:  [36.9 °C (98.5 °F)] 36.9 °C (98.5  °F)  Pulse:  [57-75] 61  Resp:  [16-34] 22  BP: (164-204)/() 168/101  SpO2:  [92 %-100 %] 97 %    Physical Exam  Constitutional:       General: He is not in acute distress.     Appearance: Normal appearance.   HENT:      Head: Normocephalic and atraumatic.      Mouth/Throat:      Mouth: Mucous membranes are moist.   Eyes:      Extraocular Movements: Extraocular movements intact.   Neck:      Musculoskeletal: No neck rigidity or muscular tenderness.   Cardiovascular:      Rate and Rhythm: Normal rate and regular rhythm.      Pulses: Normal pulses.      Heart sounds: Normal heart sounds. No murmur.   Pulmonary:      Effort: Pulmonary effort is normal. No respiratory distress.      Breath sounds: Normal breath sounds.   Abdominal:      General: Bowel sounds are normal. There is no distension.      Palpations: Abdomen is soft.      Tenderness: There is abdominal tenderness.      Comments: Tenderness on bladder palpation     Genitourinary:     Comments: Penis erect, noted to have blood leaking from both sides, likely secondary to aspiration attempts  Musculoskeletal: Normal range of motion.         General: No swelling or tenderness.   Skin:     General: Skin is warm and dry.      Coloration: Skin is not jaundiced.   Neurological:      General: No focal deficit present.      Mental Status: He is alert and oriented to person, place, and time.   Psychiatric:         Mood and Affect: Mood normal.         Behavior: Behavior normal.         Labs:   Recent Results (from the past 24 hour(s))   EKG    Collection Time: 20  7:12 PM   Result Value Ref Range    Report       [facility] Emergency Dept.    Test Date:  2020  Pt Name:    DEANNA DUEÑAS               Department: ER  MRN:        [id number]                      Room:        04  Gender:     Male                         Technician: [id number]  :        [id number]                   Requested By:ER TRIAGE PROTOCOL  Order #:    [id number]                     Reading MD: WANG ZAVALA MD    Measurements  Intervals                                Axis  Rate:       80                           P:          -12  DE:         156                          QRS:        -63  QRSD:       96                           T:          86  QT:         428  QTc:        494    Interpretive Statements  SINUS RHYTHM  LEFT ANTERIOR FASCICULAR BLOCK  BORDERLINE PROLONGED QT INTERVAL  Compared to ECG 02/20/2020 10:36:48  Left anterior fascicular block now present  Sinus bradycardia no longer present  Left-axis deviation no longer present  ST (T wave) deviation no longer present  Electronically Signed On  7- 19:21:07 PDT by WANG ZAVALA MD     CBC with Differential    Collection Time: 07/22/20  7:15 PM   Result Value Ref Range    WBC 11.2 (H) 4.8 - 10.8 K/uL    RBC 4.93 4.70 - 6.10 M/uL    Hemoglobin 15.4 14.0 - 18.0 g/dL    Hematocrit 45.2 42.0 - 52.0 %    MCV 91.7 81.4 - 97.8 fL    MCH 31.2 27.0 - 33.0 pg    MCHC 34.1 33.7 - 35.3 g/dL    RDW 42.9 35.9 - 50.0 fL    Platelet Count 200 164 - 446 K/uL    MPV 10.8 9.0 - 12.9 fL    Neutrophils-Polys 70.50 44.00 - 72.00 %    Lymphocytes 18.30 (L) 22.00 - 41.00 %    Monocytes 10.00 0.00 - 13.40 %    Eosinophils 0.70 0.00 - 6.90 %    Basophils 0.30 0.00 - 1.80 %    Immature Granulocytes 0.20 0.00 - 0.90 %    Nucleated RBC 0.00 /100 WBC    Neutrophils (Absolute) 7.91 (H) 1.82 - 7.42 K/uL    Lymphs (Absolute) 2.05 1.00 - 4.80 K/uL    Monos (Absolute) 1.12 (H) 0.00 - 0.85 K/uL    Eos (Absolute) 0.08 0.00 - 0.51 K/uL    Baso (Absolute) 0.03 0.00 - 0.12 K/uL    Immature Granulocytes (abs) 0.02 0.00 - 0.11 K/uL    NRBC (Absolute) 0.00 K/uL   Troponin STAT    Collection Time: 07/22/20  7:15 PM   Result Value Ref Range    Troponin T 24 (H) 6 - 19 ng/L   Basic Metabolic Panel (BMP)    Collection Time: 07/22/20  7:15 PM   Result Value Ref Range    Sodium 138 135 - 145 mmol/L    Potassium 4.2 3.6 - 5.5 mmol/L    Chloride 102 96 - 112  mmol/L    Co2 20 20 - 33 mmol/L    Glucose 125 (H) 65 - 99 mg/dL    Bun 26 (H) 8 - 22 mg/dL    Creatinine 1.57 (H) 0.50 - 1.40 mg/dL    Calcium 9.7 8.5 - 10.5 mg/dL    Anion Gap 16.0 7.0 - 16.0   proBrain Natriuretic Peptide, NT    Collection Time: 07/22/20  7:15 PM   Result Value Ref Range    NT-proBNP 1642 (H) 0 - 125 pg/mL   ESTIMATED GFR    Collection Time: 07/22/20  7:15 PM   Result Value Ref Range    GFR If  54 (A) >60 mL/min/1.73 m 2    GFR If Non African American 45 (A) >60 mL/min/1.73 m 2   SICKLE CELL SCREEN    Collection Time: 07/22/20  7:15 PM   Result Value Ref Range    Sickle Cell Solubility Screen Negative Negative   URINALYSIS,CULTURE IF INDICATED    Collection Time: 07/22/20  8:19 PM    Specimen: Urine   Result Value Ref Range    Color Yellow     Character Clear     Specific Gravity 1.025 <1.035    Ph 5.0 5.0 - 8.0    Glucose Negative Negative mg/dL    Ketones Trace (A) Negative mg/dL    Protein 30 (A) Negative mg/dL    Bilirubin Negative Negative    Urobilinogen, Urine 0.2 Negative    Nitrite Negative Negative    Leukocyte Esterase Negative Negative    Occult Blood Large (A) Negative    Micro Urine Req Microscopic    URINE DRUG SCREEN    Collection Time: 07/22/20  8:19 PM   Result Value Ref Range    Amphetamines Urine Negative Negative    Barbiturates Negative Negative    Benzodiazepines Negative Negative    Cocaine Metabolite Negative Negative    Methadone Negative Negative    Opiates Positive (A) Negative    Oxycodone Negative Negative    Phencyclidine -Pcp Negative Negative    Propoxyphene Negative Negative    Cannabinoid Metab Positive (A) Negative   URINE MICROSCOPIC (W/UA)    Collection Time: 07/22/20  8:19 PM   Result Value Ref Range    WBC 0-2 (A) /hpf    -150 (A) /hpf    Bacteria Negative None /hpf    Epithelial Cells Negative /hpf    Hyaline Cast 0-2 /lpf   Troponin in two (2) hours    Collection Time: 07/22/20  9:17 PM   Result Value Ref Range    Troponin T 24 (H)  6 - 19 ng/L   VENOUS BLOOD GAS    Collection Time: 07/22/20 10:00 PM   Result Value Ref Range    Venous Bg Ph 7.08 (LL) 7.31 - 7.45    Venous Bg Pco2 61.1 (H) 41.0 - 51.0 mmHg    Venous Bg Po2 36.4 25.0 - 40.0 mmHg    Venous Bg O2 Saturation 63.9 %    Venous Bg Hco3 18 (L) 24 - 28 mmol/L    Venous Bg Base Excess -13 mmol/L    Body Temp see below Centigrade   COVID/SARS CoV-2 PCR    Collection Time: 07/22/20 10:37 PM    Specimen: Nasopharyngeal; Respirate   Result Value Ref Range    COVID Order Status Received    SARS-CoV-2, PCR (In-House)    Collection Time: 07/22/20 10:37 PM   Result Value Ref Range    SARS-CoV-2 Source NP Swab     SARS-CoV-2 by PCR NotDetected        EKG: Per my read, sinus rhythym    Imaging:   XR chest: cardiomegaly, clear bilateral lungs, no signs of acute cardiopulmonary disease    Assessment and Plan    Priapism refractory to aspiration likely ischemic   -Patient taken by urology for emergent surgery  -NPO  -Holding anticoagulation    Chest pain,most likely demand ischemia in setting of hypertensive urgency  -ACS less likely given negative EKG findings, only mildly elevated troponin  SHERRI score: 2; risk factors; diabetes, hyperlipidemia, hypertension  -Anxiety likely a contributing factor given patient history of priapism   -Day team to consider obtaining cardiac stress test in AM    Hypertensive urgency ; 170/70 noted in ED  -restart home meds  -IV prns  -medication adherence counseling when appropriate  -day team to consider readjustement of home antihypertensive regimen    Elevated troponin of 24  -likely due to demand ischemia in setting of hypertensive urgency  -Trend troponins    CRISELDA: creatinine of 1.57  -likely secondary to priapism  -Lactated ringers infusion    Elevated BNP 1642  -likely due to combination of patient age and kidney injury  -Negative for edema on exam,negative for crackles on exam, negative for edema on xray bilateral lungs clear. Likely no in a fluid overload  state.    CAD s/p drug eluding stent placement in January  -on prasogril and asprin  -Holding home prosgril given emergent surgery;  day team to restart when appropraite, has already reached six month lashell of dual antiplatelet therapy       Hyperglycemia in setting of DM2  -Hold home metoformin  -a1c  -if blood glucose rises above 180, can consider initiating insulin therapy      QUALITY MEASURES  CODE STATUS: FULL  VTE PROPHYLAXIS: Holding due to Urology Procedure  DIET: NPO due to Urology procedure  DISPOSITION: Inpatient    Theron Fitzpatrick M.D. PGY-1: Internal Medicine

## 2020-07-23 NOTE — PROGRESS NOTES
2 RN Skin Check    2 RN skin check complete w/ Fede RN.   Devices in place: None.  Skin assessed under devices: N/A.  Confirmed pressure ulcers found on: None.  New potential pressure ulcers noted on None. Wound consult placed No.  The following interventions in place Pillows.    Skin intact over all bony prominences without redness or breakdown. Sacrum pink/blanching. Penile wound s/p surgical intervention wrapped in coband with edema/bruising present.

## 2020-07-23 NOTE — PROGRESS NOTES
Pt received from PACU to T811-2. Tele monitor in place SR 74 per monitors. VSS. Pt oriented to room. Educated on use of the call light. Pt demonstrated use of the call light. Discussed POC. All questions answered.

## 2020-07-23 NOTE — OR NURSING
Pt awake, alert and oriented without c/o pain. Still awaiting room assignment. Penis incision stable without further bleeding or swelling.

## 2020-07-23 NOTE — ANESTHESIA PROCEDURE NOTES
Airway    Date/Time: 7/22/2020 11:40 PM  Performed by: Ramon Sawyer M.D.  Authorized by: Ramon Sawyer M.D.     Location:  OR  Urgency:  Elective  Difficult Airway: No    Indications for Airway Management:  Anesthesia      Spontaneous Ventilation: absent    Sedation Level:  Deep  Preoxygenated: Yes    Patient Position:  Sniffing  Final Airway Type:  Endotracheal airway  Final Endotracheal Airway:  ETT  Cuffed: Yes    Technique Used for Successful ETT Placement:  Direct laryngoscopy    Insertion Site:  Oral  Blade Type:  Solo  Laryngoscope Blade/Videolaryngoscope Blade Size:  3  ETT Size (mm):  8.0  Measured from:  Lips  ETT to Lips (cm):  24  Placement Verified by: auscultation and capnometry    Cormack-Lehane Classification:  Grade IIb - view of arytenoids or posterior of glottis only  Number of Attempts at Approach:  1  Number of Other Approaches Attempted:  0

## 2020-07-23 NOTE — ANESTHESIA POSTPROCEDURE EVALUATION
Patient: Conner Fox    Procedure Summary     Date:  07/22/20 Room / Location:  [place] [address] / SURGERY [facility]    Anesthesia Start:  2332 Anesthesia Stop:  07/23/20 0122    Procedure:  Aspiration and Irrigation of priapism, pnlpzfzm-uezhfziopf-bzvvvvyc glandular shunt (Penis) Diagnosis:  (low flow ischemic priapism)    Surgeon:  Homer Gomez M.D. Responsible Provider:  Leticia Posada M.D.    Anesthesia Type:  general ASA Status:  3 - Emergent          Final Anesthesia Type: general  Last vitals  BP   Blood Pressure: 142/64    Temp   36 °C (96.8 °F)    Pulse   Pulse: (!) 59   Resp   15    SpO2   100 %      Anesthesia Post Evaluation    Patient location during evaluation: PACU  Patient participation: complete - patient participated  Level of consciousness: awake and alert    Airway patency: patent  Anesthetic complications: no  Cardiovascular status: hemodynamically stable  Respiratory status: acceptable  Hydration status: euvolemic    PONV: none           Nurse Pain Score: 0 (NPRS)

## 2020-07-23 NOTE — PROGRESS NOTES
Coban removed. No active bleeding. Penis very firm and remains erect. Some tenderness but mainly on surface. Faulkner removed. Coban reapplied. For now will keep NPO as Dr. Gomez considers possibility of repeat OR.

## 2020-07-23 NOTE — PROGRESS NOTES
Triage officer note: 59 y/o male presented chest pain.    [facility] IM resident Dr Fair to discuss with ERP Dr Webb for admission to [facility] service

## 2020-07-23 NOTE — SENIOR ADMIT NOTE
"60-year-old male admitted for 36-hour priapism after spontaneous morning erection, denies taking or injecting any medications other than prescribed home meds; also having chest pain described as \"different from before,\" referring to NSTEMI status post stent placement in January 2020.      Afebrile.  Bradycardic in the 50s.  Tachypneic 30s.  Hypertensive max 204/106.  Room air  Communicative, pleasant and friendly male sitting up in bed.  Obvious discomfort.  Nondiaphoretic  Persistent erection of the penis, exquisitely tender, minimal bleeding from drainage.  Scrotum unremarkable  Tachycardic without murmur.  Anterior chest nontender, without rashes.  Good air entry throughout.      Assessment and plan:  1.  Priapism, acute, severe, 36 hours  2.  Chest pain, with mildly elevated troponins  3.  Elevated proBNP  4.  Hypertensive urgency, in the setting of HTN, likely secondary to pain, medication nonadherence, anxiety  5.  CKD 3, baseline CR 1.8  6.  Bradycardic, mild, on BB  7.  Tachypneic, likely 2/2 pain  8.  Cardiomegealy  9.  Hyperglycemia, in the setting of DM 2  10.  CAD status post GARRY stent placement (1/2020), on DAPT  11.  Cannabinoid use  12.?Opioid use versus UDS collected after pain management  13.  History of medication nonadherence  14.  Bilateral nephrolithiasis, stable    - Made urgent referral to urology, Dr. Gomez.  Went to the OR for corporeal aspiration and irrigation.  Tolerated well.  Will need dressing changes.  Pain control. Follow up on post-op CBC  - Troponins low and flat, SHERRI 2, low suspicion for ACS.  Etiology likely secondary to hypertensive urgency, demand ischemia.  Day team to decide if further work-up is preferred  -BP control.  Avoid nephrotoxins.  In the lieu of emergent surgery, held home Prasugrel on admission.  Has received 6 months DAPT.  Day team to restart.       To contact [facility] night team (5:30 PM-6:00 AM)  Primary team color: Hansel  Intern: Amari  Senior: Marv          "

## 2020-07-23 NOTE — OP REPORT
DATE OF SERVICE:  07/22/2020    PREOPERATIVE DIAGNOSIS:  Priapism, ischemic, low flow for 36 hours.    OPERATION AND PROCEDURES PERFORMED:  1.  Repeat corporal aspiration and irrigation of priapism with saline and   phenylephrine under general anesthesia.  2.  Corpora-cavernosal, corpora-glandular shunt with snake procedure.    SURGEON:  Homer Gomez MD    ANESTHESIA:  General endotracheal.    ANESTHESIOLOGISTS:  Ramon Sawyer MD and Leticia Posada MD    POSTOPERATIVE DIAGNOSIS:  Priapism, ischemic, low flow for 36 hours.    COMPLICATIONS:  None.    DRAINS:  16-Kazakh Faulkner to gravity.    ESTIMATED BLOOD LOSS:  400 mL.    INTRAOPERATIVE FINDINGS:  The patient has partial detumescence with aspiration   and irrigation with saline and phenylephrine, but despite multiple boluses, I   never was able to establish bright red blood flow and once the aspiration was   stopped, he began to fill back up with an erection with mild lightening of   anesthesia.  As a consequence, decision was made to move forward with the   shunt procedure and at the time of the shunt, he was noted to have very   fibrotic corporal bodies on both the left and right side.      INDICATIONS:  The patient is a 60-year-old gentleman who presented to the   emergency room with chest pain and a priapism for over 36 hours.  He was   evaluated, felt to have significant hypertension which was managed by the   emergency room physician, felt unlikely to have significant coronary artery   disease despite a prior history of coronary artery disease and on Effient.  In   the emergency room, he was noted to have the priapism.  It was aspirated.  A   blood gas was sent.  He was hypoxic and consistent with a low flow priapism   and despite attempts at irrigation and aspiration by Dr. Webb, the   erection returned.  He was seen in consultation by myself and I recommended   general anesthesia, one further attempt at aspiration and irrigation under    anesthesia and probable corpora-cavernosal to corpora-glandular shunt with the   possible snake procedure given the duration of the erection.  Prior to   surgery, I discussed with the patient the fact that he has a very high risk of   having permanent erectile dysfunction and I would be surprised if he gets   spontaneous erections moving forward given the duration of the priapism.  We   discussed the risk of the procedure including, but not limited to risk of   wound infection, pain, swelling, bleeding, significant bruising of the penile   shaft and swelling.  We discussed the potential risk of injury to the urethra   as well as the development of a urethrocutaneous fistula.  I discussed the   risk of requirement of re-operative surgery if this shunt should fail.  He is   aware of the perioperative risk of deep vein thrombosis, pulmonary embolism,   aspiration pneumonia, heart attack, stroke and death.  An informed consent was   given to me by the patient in the emergency room to proceed.    DESCRIPTION IN DETAIL:  After the informed consent was obtained, patient was   brought to the operating room and placed supine.  Bilateral sequential   compression devices were in place and operational and the general endotracheal   anesthetic was administered in a balanced fashion by Dr. Ramon Sawyer.  The   patient received intravenous Ancef.  The operative area was Betadine prepped   and draped in the usual sterile fashion.  A surgical timeout was called.  All   members of the operative team agreed as to the patient's name, procedure to be   performed without objections.  Attention was directed towards procedure.    I began the procedure by placing a 19-gauge Hanks needle into the right base   of the phallus.  He had previously been aspirated on the left side.  Once this   was inserted, I aspirated approximately 100 mL of old blood.  I used saline   irrigation back and forth and after aspiration, injected 0.5 mL of the    prepared phenylephrine, which would be 500 mcg.  These boluses were   administered approximately every 5 minutes.  Communication with anesthesia   occurred.  There was no significant hypertension.  In fact with anesthesia,   the patient was now hypotensive.  I administered a total of multiple   injections so that 6 mL were injected or 6000 mcg and despite doing this and   irrigating and removing about 400 mL of old clot, I never could establish   bright red blood and at this point in time, I left the Hanks needle in place   and with observation for about 7 minutes, the erection came back and with   lightening of anesthesia, this was clear that he would need a shunt.  At this   point in time, 16-Omani Faulkner was inserted per urethra.  I proceeded to make   a curvilinear incision in the glans at just about 4 mm above the coronal   sulcus.  I was able to palpate the tips of the corporal bodies.  I dissected   sharply down to the corporal bodies and had to use sharp dissection with a   scalpel to get into the fibrotic corporal bodies.  Once I was in the fibrotic   corporal bodies, I excised the tips of the corporal bodies to create the   shunt; however, blood flow was still not established and I used compression of   the phallus and still could not get any blood flow, so I used Hegar dilators   and snaked the corporal bodies on the left and right side carefully breaking   up the intracorporal fibrosis and this reestablished some red blood flow to   the tip of the penis.  I did snake both sides aggressively starting with the   2-3 Hegar, then 4-5 and then ultimately its approximately a 6 Hegar and at   this point, finally with establishing some bright red blood.  With the   establishment of blood, the Hanks needle previously inserted was removed.  I   injected one last time 500 mcg of phenylephrine and proceeded to close the   glans with interrupted 3-0 chromic sutures.  I then applied a Xeroform   dressing and a Coban  dressing and I created compression for the entire shaft   as he is on an antiplatelet medication to decrease the swelling and hematoma.    At the end of the case, he had tolerated the procedure well without   complications.  He was extubated in the operating room, transferred to the   recovery room where he arrived in stable condition.       ____________________________________     MD RO Young / [facility]UTH    DD:  07/23/2020 01:27:46  DT:  07/23/2020 03:02:25    D#:  [id number]  Job#:  [id number]

## 2020-07-23 NOTE — ED NOTES
Patient straight catheterized per order due to priapism for 24 hours. Penis has normal appearance, no cyanosis, no swelling. Patient reported he was able to void 3 hours prior to arrival. Patient denies any new medications or drugs. Patient states he received his TDAP and Shingles injection earlier this week by his primary. No recent trauma.

## 2020-07-23 NOTE — PROGRESS NOTES
"60 year old male with low flow ischemic priapism of over 36 hours failed aspiration and irrigation. Discussed the need for reversal with patient and the fact that he is already at high risk for having permanent damage to the corporal bodies with fibrosis and permanent ED. He is aware of the plan to attempt irrigation and aspiration under anesthesia as well as proceeding to a corporal cavernosal shunt  - corporal glandular shunt with possible \" snake\"  Procedure.  We discussed the risk of the procedure including but not limited to wound infection, bleeding as he is on effient, risk of failure to relieve the priapism possible requiring additional surgery, risk of pain, swelling, fibrosis and the high likelihood of permanent ED. We also discussed the perioperative risks of DVT,PE, aspiration pneumonia, MI,CVA and death. Informed consent was given to me to proceed by MR. Fox.  "

## 2020-07-23 NOTE — ANESTHESIA TIME REPORT
Anesthesia Start and Stop Event Times     Date Time Event    7/22/2020 2332 Anesthesia Start     2334 Ready for Procedure    7/23/2020 0122 Anesthesia Stop        Responsible Staff  07/22/20 to 07/23/20    Name Role Begin End    Ramon Sawyer M.D. Anesth 2332 0032    Leticia Posada M.D. Anesth 0032 0122        Preop Diagnosis (Free Text):  Pre-op Diagnosis     low flow ischemic priapism        Preop Diagnosis (Codes):    Post op Diagnosis  Priapism      Premium Reason  A. 3PM - 7AM    Comments:

## 2020-07-23 NOTE — ED NOTES
Bedside report given to SLOAN Gilbert for the patient going to the OR for priapism. All belongings removed from the room. Patient transported via gurney, on the cardiac monitor.

## 2020-07-24 PROBLEM — N48.30 PRIAPISM, UNSPECIFIED: Status: ACTIVE | Noted: 2020-07-24

## 2020-07-24 LAB — HGB S BLD QL: NEGATIVE

## 2020-07-24 PROCEDURE — 36415 COLL VENOUS BLD VENIPUNCTURE: CPT

## 2020-07-24 PROCEDURE — 700102 HCHG RX REV CODE 250 W/ 637 OVERRIDE(OP): Performed by: STUDENT IN AN ORGANIZED HEALTH CARE EDUCATION/TRAINING PROGRAM

## 2020-07-24 PROCEDURE — 99232 SBSQ HOSP IP/OBS MODERATE 35: CPT | Mod: GC | Performed by: HOSPITALIST

## 2020-07-24 PROCEDURE — A9270 NON-COVERED ITEM OR SERVICE: HCPCS | Performed by: INTERNAL MEDICINE

## 2020-07-24 PROCEDURE — 85660 RBC SICKLE CELL TEST: CPT

## 2020-07-24 PROCEDURE — A9270 NON-COVERED ITEM OR SERVICE: HCPCS | Performed by: STUDENT IN AN ORGANIZED HEALTH CARE EDUCATION/TRAINING PROGRAM

## 2020-07-24 PROCEDURE — 700102 HCHG RX REV CODE 250 W/ 637 OVERRIDE(OP): Performed by: INTERNAL MEDICINE

## 2020-07-24 PROCEDURE — A9270 NON-COVERED ITEM OR SERVICE: HCPCS | Performed by: HOSPITALIST

## 2020-07-24 PROCEDURE — 700111 HCHG RX REV CODE 636 W/ 250 OVERRIDE (IP): Performed by: STUDENT IN AN ORGANIZED HEALTH CARE EDUCATION/TRAINING PROGRAM

## 2020-07-24 PROCEDURE — 700102 HCHG RX REV CODE 250 W/ 637 OVERRIDE(OP): Performed by: HOSPITALIST

## 2020-07-24 PROCEDURE — 770006 HCHG ROOM/CARE - MED/SURG/GYN SEMI*

## 2020-07-24 RX ORDER — HYDROMORPHONE HYDROCHLORIDE 1 MG/ML
1 INJECTION, SOLUTION INTRAMUSCULAR; INTRAVENOUS; SUBCUTANEOUS EVERY 4 HOURS PRN
Status: DISCONTINUED | OUTPATIENT
Start: 2020-07-24 | End: 2020-07-26

## 2020-07-24 RX ORDER — OXYCODONE HYDROCHLORIDE AND ACETAMINOPHEN 5; 325 MG/1; MG/1
1 TABLET ORAL EVERY 4 HOURS PRN
Status: DISCONTINUED | OUTPATIENT
Start: 2020-07-24 | End: 2020-07-24

## 2020-07-24 RX ORDER — OXYCODONE HYDROCHLORIDE 5 MG/1
5 TABLET ORAL EVERY 6 HOURS PRN
Status: DISCONTINUED | OUTPATIENT
Start: 2020-07-24 | End: 2020-07-26

## 2020-07-24 RX ORDER — LORAZEPAM 1 MG/1
0.5 TABLET ORAL
Status: COMPLETED | OUTPATIENT
Start: 2020-07-24 | End: 2020-07-24

## 2020-07-24 RX ORDER — ACETAMINOPHEN 500 MG
1000 TABLET ORAL 3 TIMES DAILY
Status: DISCONTINUED | OUTPATIENT
Start: 2020-07-24 | End: 2020-07-27

## 2020-07-24 RX ORDER — HYDRALAZINE HYDROCHLORIDE 20 MG/ML
20 INJECTION INTRAMUSCULAR; INTRAVENOUS EVERY 6 HOURS PRN
Status: DISCONTINUED | OUTPATIENT
Start: 2020-07-24 | End: 2020-07-28

## 2020-07-24 RX ORDER — LORAZEPAM 1 MG/1
1 TABLET ORAL
Status: DISCONTINUED | OUTPATIENT
Start: 2020-07-24 | End: 2020-07-24

## 2020-07-24 RX ORDER — HYDROMORPHONE HYDROCHLORIDE 2 MG/ML
2 INJECTION, SOLUTION INTRAMUSCULAR; INTRAVENOUS; SUBCUTANEOUS ONCE
Status: COMPLETED | OUTPATIENT
Start: 2020-07-24 | End: 2020-07-24

## 2020-07-24 RX ADMIN — HYDROMORPHONE HYDROCHLORIDE 1 MG: 1 INJECTION, SOLUTION INTRAMUSCULAR; INTRAVENOUS; SUBCUTANEOUS at 22:13

## 2020-07-24 RX ADMIN — ONDANSETRON 4 MG: 2 INJECTION INTRAMUSCULAR; INTRAVENOUS at 08:05

## 2020-07-24 RX ADMIN — ACETAMINOPHEN 1000 MG: 500 TABLET ORAL at 11:07

## 2020-07-24 RX ADMIN — LOSARTAN POTASSIUM 50 MG: 50 TABLET, FILM COATED ORAL at 05:31

## 2020-07-24 RX ADMIN — LORAZEPAM 0.5 MG: 1 TABLET ORAL at 22:13

## 2020-07-24 RX ADMIN — OXYCODONE 5 MG: 5 TABLET ORAL at 19:53

## 2020-07-24 RX ADMIN — HYDROMORPHONE HYDROCHLORIDE 1 MG: 2 TABLET ORAL at 18:46

## 2020-07-24 RX ADMIN — HYDROMORPHONE HYDROCHLORIDE 2 MG: 2 INJECTION, SOLUTION INTRAMUSCULAR; INTRAVENOUS; SUBCUTANEOUS at 03:48

## 2020-07-24 RX ADMIN — OXYCODONE 5 MG: 5 TABLET ORAL at 13:11

## 2020-07-24 RX ADMIN — ATORVASTATIN CALCIUM 80 MG: 80 TABLET, FILM COATED ORAL at 20:36

## 2020-07-24 RX ADMIN — CARVEDILOL 25 MG: 25 TABLET, FILM COATED ORAL at 05:32

## 2020-07-24 RX ADMIN — DOCUSATE SODIUM 50 MG AND SENNOSIDES 8.6 MG 2 TABLET: 8.6; 5 TABLET, FILM COATED ORAL at 17:13

## 2020-07-24 RX ADMIN — CARVEDILOL 25 MG: 25 TABLET, FILM COATED ORAL at 17:13

## 2020-07-24 RX ADMIN — ACETAMINOPHEN 1000 MG: 500 TABLET ORAL at 17:13

## 2020-07-24 RX ADMIN — ASPIRIN 81 MG: 81 TABLET, COATED ORAL at 05:31

## 2020-07-24 ASSESSMENT — FIBROSIS 4 INDEX: FIB4 SCORE: 1.86

## 2020-07-24 ASSESSMENT — ENCOUNTER SYMPTOMS: ABDOMINAL PAIN: 0

## 2020-07-24 NOTE — PROGRESS NOTES
Assumed care of pt @ 1915, bedside report received from SLOAN Quezada.  Pt A&OX4 and denies any pain.  Bed locked and lowered with call light within reach and questions answered during present time.

## 2020-07-24 NOTE — PROGRESS NOTES
Pt complaining of pain. Anxious, angry, and upset about possible outcome of condition. Paged BEATRIZ Hamm for breakthrough pain, received an order for IV Dilaudid 2mg. Pt responded well. Paged Urology to inform them of breakthrough pain. No changes regarding priapism.

## 2020-07-24 NOTE — PROGRESS NOTES
Urology team informed this RN that patient will not be going back for additional surgery this evening. New orders to allow patient to eat.

## 2020-07-24 NOTE — PROGRESS NOTES
While assessing the wound on the penis, there is some new ecchymosis at the top, along with darkening color on the right hemisphere. [facility] notified. Urology was paged. Will continue to monitor.

## 2020-07-24 NOTE — CARE PLAN
Problem: Communication  Goal: The ability to communicate needs accurately and effectively will improve  Outcome: PROGRESSING AS EXPECTED  Intervention: [place] patient and significant other/support system to call light to alert staff of needs  Note: Complete   Intervention: Reorient patient to environment as needed  Note: Complete      Problem: Safety  Goal: Will remain free from falls  Outcome: PROGRESSING AS EXPECTED  Intervention: Assess risk factors for falls  Note: Complete   Intervention: Implement fall precautions  Note: Bed in the lowest position, call light within reach, bed alarm on, pt educated

## 2020-07-24 NOTE — PROGRESS NOTES
Report received from SLOAN Singh. Assumed care of patient. Updated patient on plan of care. Fall precautions in place, call light within reach.

## 2020-07-25 PROCEDURE — 700102 HCHG RX REV CODE 250 W/ 637 OVERRIDE(OP): Performed by: STUDENT IN AN ORGANIZED HEALTH CARE EDUCATION/TRAINING PROGRAM

## 2020-07-25 PROCEDURE — 700101 HCHG RX REV CODE 250: Performed by: STUDENT IN AN ORGANIZED HEALTH CARE EDUCATION/TRAINING PROGRAM

## 2020-07-25 PROCEDURE — 700102 HCHG RX REV CODE 250 W/ 637 OVERRIDE(OP): Performed by: INTERNAL MEDICINE

## 2020-07-25 PROCEDURE — 770006 HCHG ROOM/CARE - MED/SURG/GYN SEMI*

## 2020-07-25 PROCEDURE — A9270 NON-COVERED ITEM OR SERVICE: HCPCS | Performed by: INTERNAL MEDICINE

## 2020-07-25 PROCEDURE — 700111 HCHG RX REV CODE 636 W/ 250 OVERRIDE (IP): Performed by: HOSPITALIST

## 2020-07-25 PROCEDURE — A9270 NON-COVERED ITEM OR SERVICE: HCPCS | Performed by: STUDENT IN AN ORGANIZED HEALTH CARE EDUCATION/TRAINING PROGRAM

## 2020-07-25 PROCEDURE — 700111 HCHG RX REV CODE 636 W/ 250 OVERRIDE (IP): Performed by: STUDENT IN AN ORGANIZED HEALTH CARE EDUCATION/TRAINING PROGRAM

## 2020-07-25 PROCEDURE — 99232 SBSQ HOSP IP/OBS MODERATE 35: CPT | Mod: GC | Performed by: HOSPITALIST

## 2020-07-25 RX ORDER — DIPHENHYDRAMINE HCL 25 MG
25 TABLET ORAL ONCE
Status: DISPENSED | OUTPATIENT
Start: 2020-07-25 | End: 2020-07-26

## 2020-07-25 RX ORDER — LORAZEPAM 2 MG/ML
0.5 INJECTION INTRAMUSCULAR
Status: COMPLETED | OUTPATIENT
Start: 2020-07-25 | End: 2020-07-25

## 2020-07-25 RX ADMIN — ATORVASTATIN CALCIUM 80 MG: 80 TABLET, FILM COATED ORAL at 19:59

## 2020-07-25 RX ADMIN — POLYETHYLENE GLYCOL 3350 1 PACKET: 17 POWDER, FOR SOLUTION ORAL at 16:21

## 2020-07-25 RX ADMIN — ACETAMINOPHEN 1000 MG: 500 TABLET ORAL at 17:33

## 2020-07-25 RX ADMIN — ASPIRIN 81 MG: 81 TABLET, COATED ORAL at 05:29

## 2020-07-25 RX ADMIN — DOCUSATE SODIUM 50 MG AND SENNOSIDES 8.6 MG 2 TABLET: 8.6; 5 TABLET, FILM COATED ORAL at 16:22

## 2020-07-25 RX ADMIN — ACETAMINOPHEN 1000 MG: 500 TABLET ORAL at 13:02

## 2020-07-25 RX ADMIN — OXYCODONE 5 MG: 5 TABLET ORAL at 16:22

## 2020-07-25 RX ADMIN — DOCUSATE SODIUM 50 MG AND SENNOSIDES 8.6 MG 2 TABLET: 8.6; 5 TABLET, FILM COATED ORAL at 05:29

## 2020-07-25 RX ADMIN — CARVEDILOL 25 MG: 25 TABLET, FILM COATED ORAL at 09:09

## 2020-07-25 RX ADMIN — OXYCODONE 5 MG: 5 TABLET ORAL at 09:10

## 2020-07-25 RX ADMIN — ACETAMINOPHEN 1000 MG: 500 TABLET ORAL at 05:29

## 2020-07-25 RX ADMIN — LORAZEPAM 0.5 MG: 2 INJECTION INTRAMUSCULAR; INTRAVENOUS at 23:08

## 2020-07-25 RX ADMIN — LOSARTAN POTASSIUM 50 MG: 50 TABLET, FILM COATED ORAL at 05:29

## 2020-07-25 RX ADMIN — HYDROMORPHONE HYDROCHLORIDE 1 MG: 1 INJECTION, SOLUTION INTRAMUSCULAR; INTRAVENOUS; SUBCUTANEOUS at 20:39

## 2020-07-25 RX ADMIN — ONDANSETRON 4 MG: 2 INJECTION INTRAMUSCULAR; INTRAVENOUS at 00:03

## 2020-07-25 ASSESSMENT — ENCOUNTER SYMPTOMS: ABDOMINAL PAIN: 0

## 2020-07-25 NOTE — PROGRESS NOTES
Note to reader: this note follows the APSO format rather than the historical SOAP format. Assessment and plan located at the top of the note for ease of use.    Chief Complaint  60 y.o. year old male here with priapism    Assessment/Plan  Interval History   Priapism s/p Fyuwppll-kpahmckgbt-tquyryyc glandular shunt with Snake procedure 7/23/20      Unfortunately, not likely to benefit from additional surgical intervention or Phenylephrine injections.   Would encourage pain management with PO medications and discharge when stable.  Plan follow up in office in 1 week.       Case discussed with patient and Urology-Dr. Jason LINO  Patient seen and examined    7/25. POD #2. Pain management improved. Still erect, although seems to be more firm in the am. Voiding without difficulty. BP improved.     7/24. POD #1. Pain through night requiring Dilaudid but now better after dose at 0300. States penis is more soft. Voiding. Has been hypertensive.           Review of Systems  Physical Exam   Review of Systems   Gastrointestinal: Negative for abdominal pain.   Genitourinary: Negative for hematuria.     Vitals:    07/24/20 1618 07/24/20 2000 07/25/20 0411 07/25/20 0835   BP: (!) 173/109 145/94 146/99 155/99   Pulse: 65 66 71 60   Resp: 18 18 17 18   Temp: 36.4 °C (97.6 °F) 36.7 °C (98 °F) 36.5 °C (97.7 °F)    TempSrc: Temporal Temporal Temporal    SpO2: 97% 95% 98% 97%   Weight:  95.3 kg (210 lb 1.6 oz)     Height:         Physical Exam   Constitutional: He is oriented to person, place, and time and well-developed, well-nourished, and in no distress. No distress.   Pulmonary/Chest: Effort normal.   Genitourinary:    Genitourinary Comments: Penis erect and firm. Some tenderness. Coban bandage removed. Dark ecchymosis along incision site on glans as before. No active bleeding. Ecchymosis at base of penile shaft.      Neurological: He is alert and oriented to person, place, and time.   Skin: Skin is warm and dry.   Psychiatric: Affect  and judgment normal.   Nursing note and vitals reviewed.       Hematology Chemistry   Lab Results   Component Value Date/Time    WBC 11.6 (H) 07/23/2020 06:35 AM    HEMOGLOBIN 14.6 07/23/2020 06:35 AM    HEMATOCRIT 44.1 07/23/2020 06:35 AM    PLATELETCT 158 (L) 07/23/2020 06:35 AM     Lab Results   Component Value Date/Time    SODIUM 131 (L) 07/23/2020 06:35 AM    POTASSIUM 4.1 07/23/2020 06:35 AM    CHLORIDE 96 07/23/2020 06:35 AM    CO2 25 07/23/2020 06:35 AM    GLUCOSE 195 (H) 07/23/2020 06:35 AM    BUN 25 (H) 07/23/2020 06:35 AM    CREATININE 1.57 (H) 07/23/2020 06:35 AM         Labs not explicitly included in this progress note were reviewed by the author.   Radiology/imaging not explicitly included in this progress note was reviewed by the author.     Medications reviewed and Labs reviewed

## 2020-07-25 NOTE — ASSESSMENT & PLAN NOTE
Improving. Penis still erected and firm, decreased from yesterday's exam   No repeat surgery per urology   Penile check BID     Pain control-   He is currently on Tylenol 1000mg TID PRN, Dilaudid 1mg q6H PRN, Oxycodone 5mg q4H PRN   Requires 2mg IV Dilaudid overnight.   Will monitor need and adjust medication regimen

## 2020-07-25 NOTE — PROGRESS NOTES
Assumed care at 0700. Bedside report received from \[facility]\"". Patient's chart and MAR reviewed. Pt complains of mild penis pain at this time. Pt is A & O 4. Patient was updated on plan of care for the day. Questions answered and concerns addressed.  Pt denies any additional needs at this time. White board updated. Call light, phone and personal belongings within reach.

## 2020-07-25 NOTE — PROGRESS NOTES
[facility] Cross-Coverage Note    S: Received page from RN: Pt was in 10/10 pain in his penile area and was very distressed. Pt received 1 mg dilaudid PO and tylenol 1000 mg earlier but states pain was not alleviated. Went promptly to pt bedside with my upper level, Dr. Anastacio Fair. RN at bedside. Pt re-iterated his pain issues and requested medication.    O: Pt laying comfortably, in bed, not diaphoretic, anxious, able to converse readily.  Obvious fullness of penis under bedsheet.    Pt VS: HR 65, /109, SPO2 97% on RA, RR 18.    A&P:  -Explained to patient process of baseline pain medications as well as PRN pain medications. Explained to patient that he may experience some pain due to having very recent surgery. Patient expressed understanding and expressed cooperation with new pain regimen to start IV dilaudid and PO ativan for pain relief.  -Ordered PO Ativan 0.5 mg and started IV dilaudid 1 mg q4 PRN.  -Continue to monitor closely    Anisha Riley MD, MPH  [facility], PGY-1

## 2020-07-25 NOTE — ASSESSMENT & PLAN NOTE
Range in upper 140s with episodes in 160s-170s.Acceptable range during acute episode. Goal of <140/90 for outpatient control.  Presumptively spikes due to pain  Currently on Lorsartan 50mg and Carvedilol 25mg BID. Will monitor

## 2020-07-25 NOTE — ASSESSMENT & PLAN NOTE
Cr and GFR better comparing to lab data from previous admission (2/19/2020). Unknown baseline from out patient. Maybe CKD   Encourage liberal hydration.   Monitor with BMP.

## 2020-07-25 NOTE — CARE PLAN
Problem: Safety  Goal: Will remain free from injury  Outcome: PROGRESSING AS EXPECTED  Pt educated on the importance fall prevention methods, such as treaded sock and the bed alarm. Pt stated they will use the call light prior to any attempts of ambulation. Ambulatory ability assessed, treaded socks in place, bed locked and in low position, frequent trips to bathroom offered, and call light and phone within reach.      Problem: Knowledge Deficit  Goal: Knowledge of disease process/condition, treatment plan, diagnostic tests, and medications will improve  Outcome: PROGRESSING AS EXPECTED  Pt educated regarding plan of care and medications. All questions answered.

## 2020-07-25 NOTE — PROGRESS NOTES
Daily Progress Note:     Date of Service: 7/24/2020  Primary Team: [facility] Team   Attending: Stephen Moncada M.D.   Senior Resident: Dr. Mccurdy  Intern: Dr. Saba  Contact:  [phone]    Chief Complaint:   Priaprism    Subjective:   Conner is a 60 y.o. male with a past medical history that includes unstable angina, prior NSTEMI, Htn, history of elevated A1C, and BPH who presented 7/22/2020 with priapism of 36 hrs duration on presentation and associated non-cardiac chest pain. He is s/p repeat corporal aspiration and irrigation of priapism with saline and phenylephrine under general anesthesia and corpora-cavernosal, corpora-glandular shunt with snake procedure. He is POD 2     Patient is lying in bed, does not appear to be in acute distress. Reports decreased pain from priapism. Denies any chest pain. Expresses that his major concern right now is pain control    Overnight, patient had taken Dilaudid 1mg at around 10pm which did not adequately control his pain. He was given Dilaudid 2mg IV, Ativan 0.5mg which controlled his pain and also sedated him significantly     Consultants/Specialty:  Urology     Review of Systems:    Constitutional: Negative for chills and fever.   HENT: Negative for hearing loss and tinnitus.    Eyes: Negative for blurred vision and double vision.   Respiratory: Negative for cough and hemoptysis.    Cardiovascular: Chest pain resolved. Negative for palpitations and orthopnea.   Gastrointestinal: Positive for constipation. Negative for abdominal pain, blood in stool, heartburn, nausea and vomiting.   Genitourinary: Priaprism. Negative for frequency, hematuria and urgency.   Musculoskeletal: Negative for myalgias and neck pain.   Neurological: Positive for headaches. Negative for dizziness and tingling.   Psychiatric/Behavioral: Negative for substance abuse.     Objective Data:     Vitals:   Temp:  [36.3 °C (97.3 °F)-36.9 °C (98.4 °F)] 36.7 °C (98 °F)  Pulse:  [55-70] 66  Resp:   [16-18] 18  BP: (128-173)/() 145/94  SpO2:  [93 %-97 %] 95 %    Physical Exam  Constitutional:       General: He is not in acute distress.     Appearance: Normal appearance.   HENT:      Head: Normocephalic and atraumatic.      Mouth/Throat:      Mouth: Mucous membranes are moist.   Eyes:      Extraocular Movements: Extraocular movements intact.   Neck:      Musculoskeletal: No neck rigidity or muscular tenderness.   Cardiovascular:      Rate and Rhythm: Normal rate and regular rhythm.      Pulses: Normal pulses.      Heart sounds: Normal heart sounds. No murmur.   Pulmonary:      Effort: Pulmonary effort is normal. No respiratory distress.      Breath sounds: Normal breath sounds.   Abdominal:      General: Bowel sounds are normal. There is no distension.      Palpations: Abdomen is soft.      Tenderness: Not TTP  Musculoskeletal: Normal range of motion.         General: No swelling or tenderness.   Skin:     General: Skin is warm and dry.      Coloration: Skin is not jaundiced.   Neurological:      General: No focal deficit present.      Mental Status: He is alert and oriented to person, place, and time.   Psychiatric:         Mood and Affect: Mood normal.         Behavior: Behavior normal.     : Penis erect and firm, lower angle of erection in comparison to last night, less firm. Some tenderness.  Dark ecchymosis along incision site on glans. No active bleeding. Slight ecchymosis at base of penile shaft.      Labs:   Recent Labs     07/22/20 1915 07/23/20  0635   WBC 11.2* 11.6*   RBC 4.93 4.65*   HEMOGLOBIN 15.4 14.6   HEMATOCRIT 45.2 44.1   MCV 91.7 94.8   MCH 31.2 31.4   RDW 42.9 44.7   PLATELETCT 200 158*   MPV 10.8 11.1   NEUTSPOLYS 70.50 90.00*   LYMPHOCYTES 18.30* 5.30*   MONOCYTES 10.00 4.20   EOSINOPHILS 0.70 0.10   BASOPHILS 0.30 0.10     Recent Labs     07/22/20 1915 07/23/20  0635   SODIUM 138 131*   POTASSIUM 4.2 4.1   CHLORIDE 102 96   CO2 20 25   GLUCOSE 125* 195*   BUN 26* 25*        Imaging:     DX-CHEST-PORTABLE (1 VIEW) 7/23/2020   Final Result         1.  No acute cardiopulmonary disease.   2.  Cardiomegaly          Problem Representation:  Conner is a 60 y.o. male with a past medical history that includes unstable angina, prior NSTEMI, Htn, history of elevated A1C, and BPH who presented 7/22/2020 with priapism of 36 hrs duration on presentation and associated non-cardiac chest pain. He is s/p repeat corporal aspiration and irrigation of priapism with saline and phenylephrine under general anesthesia and corpora-cavernosal, corpora-glandular shunt with snake procedure    * Priapism, unspecified- (present on admission)  Overview  s/p repeat corporal aspiration and irrigation of priapism with saline and phenylephrine under general anesthesia and corpora-cavernosal, corpora-glandular shunt with snake procedure.     Assessment & Plan  Improving. Penis still erected and firm, decreased from yesterday's exam   No repeat surgery per urology   Penile check BID     Pain control-   He is currently on Tylenol 1000mg TID PRN, Dilaudid 1mg q6H PRN, Oxycodone 5mg q4H PRN   Requires 2mg IV Dilaudid overnight.   Will monitor need and adjust medication regimen     CRISELDA (acute kidney injury) (HCC)- (present on admission)  Assessment & Plan  Cr and GFR better comparing to lab data from previous admission (2/19/2020). Unknown baseline from out patient. Maybe CKD   Encourage liberal hydration.   Monitor with BMP.     Essential hypertension- (present on admission)  Assessment & Plan  Range in upper 140s with episodes in 160s-170s.Acceptable range during acute episode. Goal of <140/90 for outpatient control.  Presumptively spikes due to pain  Currently on Lorsartan 50mg and Carvedilol 25mg BID. Will monitor

## 2020-07-25 NOTE — PROGRESS NOTES
Report received from day shift Seble LISA. Bed locked and in lowest position, pt appears walking around in the room, no signs of discomfort or distress, RA, alert and oriented, saline locked, complaints of pain, will check MAR for interventions, call light in reach, pt is stable at this moment, will continue to monitor.

## 2020-07-25 NOTE — CARE PLAN
Problem: Communication  Goal: The ability to communicate needs accurately and effectively will improve  Outcome: PROGRESSING AS EXPECTED     Problem: Safety  Goal: Will remain free from injury  Outcome: PROGRESSING AS EXPECTED     Problem: Knowledge Deficit  Goal: Knowledge of disease process/condition, treatment plan, diagnostic tests, and medications will improve  Outcome: PROGRESSING AS EXPECTED     Problem: Mobility  Goal: Risk for activity intolerance will decrease  Outcome: PROGRESSING AS EXPECTED

## 2020-07-25 NOTE — PROGRESS NOTES
Updated MD on recent complaints of itching all over body, pt currently sleeping at this time, no signs of discomfort or distress, will continue to monitor, waiting on order updates.

## 2020-07-26 PROCEDURE — 99232 SBSQ HOSP IP/OBS MODERATE 35: CPT | Mod: GC | Performed by: HOSPITALIST

## 2020-07-26 PROCEDURE — 700102 HCHG RX REV CODE 250 W/ 637 OVERRIDE(OP): Performed by: INTERNAL MEDICINE

## 2020-07-26 PROCEDURE — 700111 HCHG RX REV CODE 636 W/ 250 OVERRIDE (IP): Performed by: INTERNAL MEDICINE

## 2020-07-26 PROCEDURE — A9270 NON-COVERED ITEM OR SERVICE: HCPCS | Performed by: STUDENT IN AN ORGANIZED HEALTH CARE EDUCATION/TRAINING PROGRAM

## 2020-07-26 PROCEDURE — 700111 HCHG RX REV CODE 636 W/ 250 OVERRIDE (IP): Performed by: STUDENT IN AN ORGANIZED HEALTH CARE EDUCATION/TRAINING PROGRAM

## 2020-07-26 PROCEDURE — A9270 NON-COVERED ITEM OR SERVICE: HCPCS | Performed by: INTERNAL MEDICINE

## 2020-07-26 PROCEDURE — 700102 HCHG RX REV CODE 250 W/ 637 OVERRIDE(OP): Performed by: STUDENT IN AN ORGANIZED HEALTH CARE EDUCATION/TRAINING PROGRAM

## 2020-07-26 PROCEDURE — 770006 HCHG ROOM/CARE - MED/SURG/GYN SEMI*

## 2020-07-26 RX ORDER — HYDROMORPHONE HYDROCHLORIDE 1 MG/ML
1 INJECTION, SOLUTION INTRAMUSCULAR; INTRAVENOUS; SUBCUTANEOUS EVERY 6 HOURS PRN
Status: DISCONTINUED | OUTPATIENT
Start: 2020-07-26 | End: 2020-07-27

## 2020-07-26 RX ORDER — OXYCODONE HYDROCHLORIDE 5 MG/1
5 TABLET ORAL EVERY 8 HOURS PRN
Status: DISCONTINUED | OUTPATIENT
Start: 2020-07-26 | End: 2020-07-26

## 2020-07-26 RX ORDER — OXYCODONE HYDROCHLORIDE 5 MG/1
5 TABLET ORAL EVERY 8 HOURS
Status: DISCONTINUED | OUTPATIENT
Start: 2020-07-26 | End: 2020-07-27

## 2020-07-26 RX ADMIN — ONDANSETRON 4 MG: 2 INJECTION INTRAMUSCULAR; INTRAVENOUS at 18:04

## 2020-07-26 RX ADMIN — OXYCODONE 5 MG: 5 TABLET ORAL at 05:43

## 2020-07-26 RX ADMIN — HYDROMORPHONE HYDROCHLORIDE 1 MG: 1 INJECTION, SOLUTION INTRAMUSCULAR; INTRAVENOUS; SUBCUTANEOUS at 00:38

## 2020-07-26 RX ADMIN — OXYCODONE 5 MG: 5 TABLET ORAL at 22:14

## 2020-07-26 RX ADMIN — HYDRALAZINE HYDROCHLORIDE 20 MG: 20 INJECTION INTRAMUSCULAR; INTRAVENOUS at 05:43

## 2020-07-26 RX ADMIN — ACETAMINOPHEN 1000 MG: 500 TABLET ORAL at 05:30

## 2020-07-26 RX ADMIN — ACETAMINOPHEN 1000 MG: 500 TABLET ORAL at 12:03

## 2020-07-26 RX ADMIN — ATORVASTATIN CALCIUM 80 MG: 80 TABLET, FILM COATED ORAL at 20:27

## 2020-07-26 RX ADMIN — OXYCODONE 5 MG: 5 TABLET ORAL at 14:34

## 2020-07-26 RX ADMIN — DOCUSATE SODIUM 50 MG AND SENNOSIDES 8.6 MG 2 TABLET: 8.6; 5 TABLET, FILM COATED ORAL at 05:30

## 2020-07-26 RX ADMIN — LOSARTAN POTASSIUM 50 MG: 50 TABLET, FILM COATED ORAL at 05:30

## 2020-07-26 RX ADMIN — ACETAMINOPHEN 1000 MG: 500 TABLET ORAL at 17:53

## 2020-07-26 RX ADMIN — ASPIRIN 81 MG: 81 TABLET, COATED ORAL at 05:30

## 2020-07-26 RX ADMIN — MAGNESIUM HYDROXIDE 30 ML: 400 SUSPENSION ORAL at 14:36

## 2020-07-26 RX ADMIN — ONDANSETRON 4 MG: 2 INJECTION INTRAMUSCULAR; INTRAVENOUS at 23:53

## 2020-07-26 RX ADMIN — CARVEDILOL 25 MG: 25 TABLET, FILM COATED ORAL at 09:02

## 2020-07-26 RX ADMIN — CARVEDILOL 25 MG: 25 TABLET, FILM COATED ORAL at 17:53

## 2020-07-26 RX ADMIN — HYDROMORPHONE HYDROCHLORIDE 1 MG: 1 INJECTION, SOLUTION INTRAMUSCULAR; INTRAVENOUS; SUBCUTANEOUS at 07:08

## 2020-07-26 RX ADMIN — HYDROMORPHONE HYDROCHLORIDE 1 MG: 1 INJECTION, SOLUTION INTRAMUSCULAR; INTRAVENOUS; SUBCUTANEOUS at 16:11

## 2020-07-26 RX ADMIN — ONDANSETRON 4 MG: 2 INJECTION INTRAMUSCULAR; INTRAVENOUS at 00:40

## 2020-07-26 RX ADMIN — ONDANSETRON 4 MG: 2 INJECTION INTRAMUSCULAR; INTRAVENOUS at 07:08

## 2020-07-26 RX ADMIN — HYDROMORPHONE HYDROCHLORIDE 1 MG: 1 INJECTION, SOLUTION INTRAMUSCULAR; INTRAVENOUS; SUBCUTANEOUS at 23:53

## 2020-07-26 ASSESSMENT — ENCOUNTER SYMPTOMS: ABDOMINAL PAIN: 0

## 2020-07-26 NOTE — PROGRESS NOTES
Assessed patients surgical site. Site was wrapped tightly with coban only, Patient complained of increasing pressure, pain and bruising at site.  Paged [facility].  MD came down to assess. Per MD,  continue to monitor, medicate patient with ordered pain medication.

## 2020-07-26 NOTE — CARE PLAN
Problem: Communication  Goal: The ability to communicate needs accurately and effectively will improve  Outcome: PROGRESSING AS EXPECTED   Patient communicates appropriately, verbalizes needs, calls for assistance.     Problem: Safety  Goal: Will remain free from injury  Outcome: PROGRESSING AS EXPECTED  Goal: Will remain free from falls  Outcome: PROGRESSING AS EXPECTED   Fall precautions in place. Patient verbalizes understanding of safety education.

## 2020-07-26 NOTE — PROGRESS NOTES
Note to reader: this note follows the APSO format rather than the historical SOAP format. Assessment and plan located at the top of the note for ease of use.    Chief Complaint  60 y.o. year old male here with priapism    Assessment/Plan  Interval History   Priapism s/p Fyhnigyo-whuxjnkmgm-lxxvsacz glandular shunt with Snake procedure 7/23/20      We again discussed prognosis and need for discussions regarding IPP in the future. He expressed understanding of this. I have explained that not likely to have return of flaccid penis. Defer pain management to Hospital team and can discharge when controlled on PO pain medications. We will be seeing in office in 1 week.   Nothing further from  standpoint. Urology signing off.       Case discussed with patient and Urology-Dr. Apryl LINO  Patient seen and examined    7/26. POD #3. Intermittent episodes of increased pain, but generally has been doing well. Voiding without difficulty. He feels penis is more soft, especially along distal shaft and glans.     7/25. POD #2. Pain management improved. Still erect, although seems to be more firm in the am. Voiding without difficulty. BP improved.     7/24. POD #1. Pain through night requiring Dilaudid but now better after dose at 0300. States penis is more soft. Voiding. Has been hypertensive.           Review of Systems  Physical Exam   Review of Systems   Gastrointestinal: Negative for abdominal pain.   Genitourinary: Negative for dysuria.     Vitals:    07/26/20 0539 07/26/20 0633 07/26/20 0800 07/26/20 0838   BP: (!) 180/125 143/81 118/68    Pulse: (!) 58  61 (!) 58   Resp:   16    Temp:   36.4 °C (97.6 °F)    TempSrc:   Temporal    SpO2:   96%    Weight:       Height:         Physical Exam   Constitutional: He is oriented to person, place, and time and well-developed, well-nourished, and in no distress. No distress.   Sleeping upon arrival   Pulmonary/Chest: Effort normal.   Genitourinary:    Genitourinary Comments: Penis  erect and firm (slightly less so than yesterday) Some tenderness. Coban bandage removed. Dark ecchymosis along incision site on glans as before. No active bleeding. Light ecchymosis at base of penile shaft and now extending along anterior scrotum.      Neurological: He is alert and oriented to person, place, and time.   Skin: Skin is warm and dry.   Psychiatric: Affect and judgment normal.   Nursing note and vitals reviewed.       Hematology Chemistry   Lab Results   Component Value Date/Time    WBC 11.6 (H) 07/23/2020 06:35 AM    HEMOGLOBIN 14.6 07/23/2020 06:35 AM    HEMATOCRIT 44.1 07/23/2020 06:35 AM    PLATELETCT 158 (L) 07/23/2020 06:35 AM     Lab Results   Component Value Date/Time    SODIUM 131 (L) 07/23/2020 06:35 AM    POTASSIUM 4.1 07/23/2020 06:35 AM    CHLORIDE 96 07/23/2020 06:35 AM    CO2 25 07/23/2020 06:35 AM    GLUCOSE 195 (H) 07/23/2020 06:35 AM    BUN 25 (H) 07/23/2020 06:35 AM    CREATININE 1.57 (H) 07/23/2020 06:35 AM         Labs not explicitly included in this progress note were reviewed by the author.   Radiology/imaging not explicitly included in this progress note was reviewed by the author.     Medications reviewed and Labs reviewed

## 2020-07-26 NOTE — PROGRESS NOTES
Received report from day staff. Assumed pt care. . AOX4. POC discussed. Tele monitor in place. Call light within reach, bed in lowest position, and personal items accessible.

## 2020-07-26 NOTE — PROGRESS NOTES
Report received from SLOAN Man. Patient complained of 8/10 pain and medication was administered and ice applied. POC discussed and white board updated. Bed is locked in lowest position with call light within reach. RN will continue to monitor.

## 2020-07-26 NOTE — PROGRESS NOTES
[facility] Cross Coverage Note    I evaluated the patient at bedside with Dr. Obrien as he was complaining of pain in his penis. The patient was not in acute distress. At bedside, the patient did not have any evidence of ischemia in the penis. There was some bruising at the suture site. The patient denied any sensory deficits including no numbness and no tingling. Medicated with PRN pain medications. Will continue to monitor closely.

## 2020-07-26 NOTE — PROGRESS NOTES
Daily Progress Note:     Date of Service: 7/25/2020  Primary Team: [facility] Team   Attending: Stephen Moncada M.D.   Senior Resident: Dr. Mccurdy  Intern: Dr. Saba  Contact:  [phone]    Chief Complaint:   Priaprism    Subjective:   Conner is a 60 y.o. male with a past medical history that includes unstable angina, prior NSTEMI, Htn, history of elevated A1C, and BPH who presented 7/22/2020 with priapism of 36 hrs duration on presentation and associated non-cardiac chest pain. He is s/p repeat corporal aspiration and irrigation of priapism with saline and phenylephrine under general anesthesia and corpora-cavernosal, corpora-glandular shunt with snake procedure. He is POD 3    Patient is lying in bed, does not appear to be in acute distress. We discussed his goals of care. He expresses that he is not ready to go home as he feel his pain is not adequately controlled.     Overnight, patient again had uncontrolled pain. Requires Oxy 5mg, Dilaudid 1mg PO, Dilaudid 2mg IV and   Ativan 0.5mg to be comfortable.     Consultants/Specialty:  Urology     Review of Systems:    Constitutional: Negative for chills and fever.   HENT: Negative for hearing loss and tinnitus.    Eyes: Negative for blurred vision and double vision.   Respiratory: Negative for cough and hemoptysis.    Cardiovascular: Chest pain resolved. Negative for palpitations and orthopnea.   Gastrointestinal: Positive for constipation. Negative for abdominal pain, blood in stool, heartburn, nausea and vomiting.   Genitourinary: Priaprism. Negative for frequency, hematuria and urgency.   Musculoskeletal: Negative for myalgias and neck pain.   Neurological: Positive for headaches. Negative for dizziness and tingling.   Psychiatric/Behavioral: Negative for substance abuse.     Objective Data:     Vitals:   Temp:  [36.5 °C (97.7 °F)-36.7 °C (98.1 °F)] 36.7 °C (98.1 °F)  Pulse:  [58-71] 58  Resp:  [17-18] 18  BP: (136-155)/(90-99) 136/90  SpO2:  [95 %-98 %] 96  %    Physical Exam  Constitutional:       General: He is not in acute distress.     Appearance: Normal appearance.   HENT:      Head: Normocephalic and atraumatic.      Mouth/Throat:      Mouth: Mucous membranes are moist.   Eyes:      Extraocular Movements: Extraocular movements intact.   Neck:      Musculoskeletal: No neck rigidity or muscular tenderness.   Cardiovascular:      Rate and Rhythm: Normal rate and regular rhythm.      Pulses: Normal pulses.      Heart sounds: Normal heart sounds. No murmur.   Pulmonary:      Effort: Pulmonary effort is normal. No respiratory distress.      Breath sounds: Normal breath sounds.   Abdominal:      General: Bowel sounds are normal. There is no distension.      Palpations: Abdomen is soft.      Tenderness: Not TTP  Musculoskeletal: Normal range of motion.         General: No swelling or tenderness.   Skin:     General: Skin is warm and dry.      Coloration: Skin is not jaundiced.   Neurological:      General: No focal deficit present.      Mental Status: He is alert and oriented to person, place, and time.   Psychiatric:         Mood and Affect: Mood normal.         Behavior: Behavior normal.     : Penis erect and firm, lower angle of erection in comparison to last night, less firm. Some tenderness.  Dark ecchymosis along incision site on glans. No active bleeding. Slight ecchymosis at base of penile shaft.      Labs:   Recent Labs     07/22/20 1915 07/23/20  0635   WBC 11.2* 11.6*   RBC 4.93 4.65*   HEMOGLOBIN 15.4 14.6   HEMATOCRIT 45.2 44.1   MCV 91.7 94.8   MCH 31.2 31.4   RDW 42.9 44.7   PLATELETCT 200 158*   MPV 10.8 11.1   NEUTSPOLYS 70.50 90.00*   LYMPHOCYTES 18.30* 5.30*   MONOCYTES 10.00 4.20   EOSINOPHILS 0.70 0.10   BASOPHILS 0.30 0.10     Recent Labs     07/22/20 1915 07/23/20  0635   SODIUM 138 131*   POTASSIUM 4.2 4.1   CHLORIDE 102 96   CO2 20 25   GLUCOSE 125* 195*   BUN 26* 25*       Imaging:     DX-CHEST-PORTABLE (1 VIEW) 7/23/2020   Final Result          1.  No acute cardiopulmonary disease.   2.  Cardiomegaly          Problem Representation:  Conner is a 60 y.o. male with a past medical history that includes unstable angina, prior NSTEMI, Htn, history of elevated A1C, and BPH who presented 7/22/2020 with priapism of 36 hrs duration on presentation and associated non-cardiac chest pain. He is s/p repeat corporal aspiration and irrigation of priapism with saline and phenylephrine under general anesthesia and corpora-cavernosal, corpora-glandular shunt with snake procedure    * Priapism, unspecified- (present on admission)  Overview  s/p repeat corporal aspiration and irrigation of priapism with saline and phenylephrine under general anesthesia and corpora-cavernosal, corpora-glandular shunt with snake procedure.     Assessment & Plan  Stable. Penis still erected and firm, decreased from yesterday's exam. 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  No repeat surgery for now per urology.   Per their consult, ok to dc with PO pain meds and follow up with their outpatient office in 1 week. Lucho urology PA-C, expressed that he and Dr. Gomez had discussed poor prognosis with patient. Likely will need penile prothesis in the future.  Will discuss with patient urology's recommendations.     Pain control-   He is currently on Tylenol 1000mg TID PRN, Dilaudid 1mg q6H PRN, Oxycodone 5mg q4H PRN   Pain management use is Tylenol 100mg TID and Oxy 5 2x. He does seems to require more at night   Consider discharging with the above and add Oxy 10qHS and ativan 0.5mg qhs     CRISELDA (acute kidney injury) (HCC)- (present on admission)  Assessment & Plan  Cr and GFR better comparing to lab data from previous admission (2/19/2020). Unknown baseline from out patient. Maybe CKD   Encourage liberal hydration.   Monitor with BMP.     Essential hypertension- (present on admission)  Assessment & Plan  Range in upper 140s with episodes  in 160s-170s.Acceptable range during acute episode. Goal of <140/90 for outpatient control.  Presumptively,  spikes due to pain  Currently on Lorsartan 50mg and Carvedilol 25mg BID. Will monitor

## 2020-07-26 NOTE — PROGRESS NOTES
Daily Progress Note:     Date of Service: 7/26/2020  Primary Team: [facility]   Attending: Stephen Moncada M.D.   Senior Resident: Dr. Mccurdy  Intern: Dr. Saba  Contact:  [phone]    Chief Complaint:   Priaprism    Subjective:   Conner is a 60 y.o. male with a past medical history that includes unstable angina, prior NSTEMI, Htn, history of elevated A1C, and BPH who presented 7/22/2020 with priapism of 36 hrs duration on presentation and associated non-cardiac chest pain. He is s/p repeat corporal aspiration and irrigation of priapism with saline and phenylephrine under general anesthesia and corpora-cavernosal, corpora-glandular shunt with snake procedure. He is POD 4.   Okay to d/c from Urology.     Pain control appears to be challenging.   Moving to scheduled Roxicodone + APAP and PRN dilaudid.   HTN: HR parameter for Coreg changed to allow administration.     Urology signed off. Reached out to them for anticipatory guidance on d/c.   Awaiting their input.     Consultants/Specialty:  Urology     Review of Systems:    Constitutional: Negative for chills and fever.   HENT: Negative for hearing loss and tinnitus.    Eyes: Negative for blurred vision and double vision.   Respiratory: Negative for cough and hemoptysis.    Cardiovascular: Chest pain resolved. Negative for palpitations and orthopnea.   Gastrointestinal: Negative for abdominal pain, blood in stool, heartburn, nausea and vomiting.   Genitourinary: Priaprism. Negative for frequency, hematuria and urgency.   Musculoskeletal: Negative for myalgias and neck pain.   Neurological: Negative for dizziness and tingling.   Psychiatric/Behavioral: Negative for substance abuse.     Objective Data:     Vitals:   Temp:  [36 °C (96.8 °F)-36.7 °C (98.1 °F)] 36 °C (96.8 °F)  Pulse:  [55-60] 58  Resp:  [16-18] 16  BP: (136-180)/() 143/81  SpO2:  [96 %-98 %] 98 %    Physical Exam  Constitutional:       General: He is not in acute distress.     Appearance:  Normal appearance.   HENT:      Head: Normocephalic and atraumatic.      Mouth/Throat:      Mouth: Mucous membranes are moist.   Eyes:      Extraocular Movements: Extraocular movements intact.   Neck:      Musculoskeletal: No neck rigidity or muscular tenderness.   Cardiovascular:      Rate and Rhythm: Normal rate and regular rhythm.      Pulses: Normal pulses.      Heart sounds: Normal heart sounds. No murmur.   Pulmonary:      Effort: Pulmonary effort is normal. No respiratory distress.      Breath sounds: Normal breath sounds.   Abdominal:      General: Bowel sounds are normal. There is no distension.      Palpations: Abdomen is soft.      Tenderness: Not TTP  Musculoskeletal: Normal range of motion.         General: No swelling or tenderness.   Skin:     General: Skin is warm and dry.      Coloration: Skin is not jaundiced.   Neurological:      General: No focal deficit present.      Mental Status: He is alert and oriented to person, place, and time.   Psychiatric:         Mood and Affect: Mood normal.         Behavior: Behavior normal.     : Penis erect and firm, lower angle of erection in comparison to last night, less firm. Some tenderness.  Dark ecchymosis along incision site on glans. No active bleeding. Slight ecchymosis at base of penile shaft.      Labs: stable, no new today.     Imaging: no new one today    Problem Representation:  Conner is a 60 y.o. male with a past medical history that includes unstable angina, prior NSTEMI, Htn, history of elevated A1C, and BPH who presented 7/22/2020 with priapism of 36 hrs duration on presentation and associated non-cardiac chest pain. He is s/p repeat corporal aspiration and irrigation of priapism with saline and phenylephrine under general anesthesia and corpora-cavernosal, corpora-glandular shunt with snake procedure    * Priapism, unspecified- (present on admission)  Overview  s/p repeat corporal aspiration and irrigation of priapism with saline and  phenylephrine under general anesthesia and corpora-cavernosal, corpora-glandular shunt with snake procedure.     Assessment & Plan  Stable. Penis still erected and firm, decreased from yesterday's exam. 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  No repeat surgery for now per urology.     Ongoing pain control  Urology to provide info on anticipatory guidance on d/c     CRISELDA (acute kidney injury) (HCC)- (present on admission)  Assessment & Plan  RESOLVED  Essential hypertension- (present on admission)  Assessment & Plan  Acute changes largely pain mediated.   Pain control  Cont home AntiHTNsivs

## 2020-07-27 PROCEDURE — 770006 HCHG ROOM/CARE - MED/SURG/GYN SEMI*

## 2020-07-27 PROCEDURE — 700102 HCHG RX REV CODE 250 W/ 637 OVERRIDE(OP): Performed by: STUDENT IN AN ORGANIZED HEALTH CARE EDUCATION/TRAINING PROGRAM

## 2020-07-27 PROCEDURE — 700102 HCHG RX REV CODE 250 W/ 637 OVERRIDE(OP): Performed by: INTERNAL MEDICINE

## 2020-07-27 PROCEDURE — A9270 NON-COVERED ITEM OR SERVICE: HCPCS | Performed by: PHYSICIAN ASSISTANT

## 2020-07-27 PROCEDURE — A9270 NON-COVERED ITEM OR SERVICE: HCPCS | Performed by: STUDENT IN AN ORGANIZED HEALTH CARE EDUCATION/TRAINING PROGRAM

## 2020-07-27 PROCEDURE — A9270 NON-COVERED ITEM OR SERVICE: HCPCS | Performed by: INTERNAL MEDICINE

## 2020-07-27 PROCEDURE — 700111 HCHG RX REV CODE 636 W/ 250 OVERRIDE (IP): Performed by: INTERNAL MEDICINE

## 2020-07-27 PROCEDURE — 99232 SBSQ HOSP IP/OBS MODERATE 35: CPT | Mod: GC | Performed by: HOSPITALIST

## 2020-07-27 PROCEDURE — 700102 HCHG RX REV CODE 250 W/ 637 OVERRIDE(OP): Performed by: PHYSICIAN ASSISTANT

## 2020-07-27 PROCEDURE — 700111 HCHG RX REV CODE 636 W/ 250 OVERRIDE (IP): Performed by: STUDENT IN AN ORGANIZED HEALTH CARE EDUCATION/TRAINING PROGRAM

## 2020-07-27 RX ORDER — PRASUGREL 10 MG/1
10 TABLET, FILM COATED ORAL DAILY
Status: DISCONTINUED | OUTPATIENT
Start: 2020-07-28 | End: 2020-07-28 | Stop reason: HOSPADM

## 2020-07-27 RX ORDER — TRAMADOL HYDROCHLORIDE 50 MG/1
50 TABLET ORAL 3 TIMES DAILY
Status: DISCONTINUED | OUTPATIENT
Start: 2020-07-27 | End: 2020-07-28

## 2020-07-27 RX ORDER — ACETAMINOPHEN 500 MG
1000 TABLET ORAL 2 TIMES DAILY
Status: DISCONTINUED | OUTPATIENT
Start: 2020-07-28 | End: 2020-07-28 | Stop reason: HOSPADM

## 2020-07-27 RX ORDER — HYDROCODONE BITARTRATE AND ACETAMINOPHEN 5; 325 MG/1; MG/1
1 TABLET ORAL EVERY 12 HOURS PRN
Status: DISCONTINUED | OUTPATIENT
Start: 2020-07-27 | End: 2020-07-28

## 2020-07-27 RX ORDER — HYDROCODONE BITARTRATE AND ACETAMINOPHEN 5; 325 MG/1; MG/1
1-2 TABLET ORAL EVERY 12 HOURS PRN
Status: DISCONTINUED | OUTPATIENT
Start: 2020-07-27 | End: 2020-07-27

## 2020-07-27 RX ORDER — TRAMADOL HYDROCHLORIDE 50 MG/1
50 TABLET ORAL EVERY 6 HOURS PRN
Status: DISCONTINUED | OUTPATIENT
Start: 2020-07-27 | End: 2020-07-27

## 2020-07-27 RX ADMIN — DOCUSATE SODIUM 50 MG AND SENNOSIDES 8.6 MG 2 TABLET: 8.6; 5 TABLET, FILM COATED ORAL at 17:42

## 2020-07-27 RX ADMIN — ACETAMINOPHEN 1000 MG: 500 TABLET ORAL at 12:17

## 2020-07-27 RX ADMIN — TRAMADOL HYDROCHLORIDE 50 MG: 50 TABLET, FILM COATED ORAL at 17:42

## 2020-07-27 RX ADMIN — CARVEDILOL 25 MG: 25 TABLET, FILM COATED ORAL at 17:42

## 2020-07-27 RX ADMIN — HYDROMORPHONE HYDROCHLORIDE 1 MG: 1 INJECTION, SOLUTION INTRAMUSCULAR; INTRAVENOUS; SUBCUTANEOUS at 08:39

## 2020-07-27 RX ADMIN — LOSARTAN POTASSIUM 50 MG: 50 TABLET, FILM COATED ORAL at 05:28

## 2020-07-27 RX ADMIN — ATORVASTATIN CALCIUM 80 MG: 80 TABLET, FILM COATED ORAL at 20:26

## 2020-07-27 RX ADMIN — ACETAMINOPHEN 1000 MG: 500 TABLET ORAL at 05:28

## 2020-07-27 RX ADMIN — CARVEDILOL 25 MG: 25 TABLET, FILM COATED ORAL at 08:39

## 2020-07-27 RX ADMIN — ONDANSETRON 4 MG: 2 INJECTION INTRAMUSCULAR; INTRAVENOUS at 09:27

## 2020-07-27 RX ADMIN — ASPIRIN 81 MG: 81 TABLET, COATED ORAL at 05:28

## 2020-07-27 RX ADMIN — TRAMADOL HYDROCHLORIDE 50 MG: 50 TABLET, FILM COATED ORAL at 14:23

## 2020-07-27 RX ADMIN — OXYCODONE 5 MG: 5 TABLET ORAL at 05:28

## 2020-07-27 ASSESSMENT — ENCOUNTER SYMPTOMS
CHILLS: 0
FEVER: 0
ABDOMINAL PAIN: 0
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
## 2020-07-27 NOTE — PROGRESS NOTES
Note to reader: this note follows the APSO format rather than the historical SOAP format. Assessment and plan located at the top of the note for ease of use.    Chief Complaint  60 y.o. year old male here with priapism    Assessment/Plan  Interval History   Priapism s/p Ryntdlmb-[id number] glandular shunt with Snake procedure 7/23/20      We again discussed prognosis and need for discussions regarding IPP in the future. He expressed understanding of this. I have explained that not likely to have return of flaccid penis. Defer pain management to Hospital team, prefer to switch to PO Tramadol now and wean off Dilaudid.   We will be seen in office this week with Dr. Gomez.       Case discussed with patient and Dr. Gomez.     Patient seen and examined    7/27 POD #4. Bandage removed. Pain controlled on Dilaudid, although Dr. Gomez would like to transition to Tramadol now.     7/26. POD #3. Intermittent episodes of increased pain, but generally has been doing well. Voiding without difficulty. He feels penis is more soft, especially along distal shaft and glans.     7/25. POD #2. Pain management improved. Still erect, although seems to be more firm in the am. Voiding without difficulty. BP improved.     7/24. POD #1. Pain through night requiring Dilaudid but now better after dose at 0300. States penis is more soft. Voiding. Has been hypertensive.           Review of Systems  Physical Exam   Review of Systems   Constitutional: Negative for chills and fever.   Gastrointestinal: Negative for abdominal pain.   Genitourinary: Negative for dysuria.   All other systems reviewed and are negative.    Vitals:    07/26/20 1900 07/27/20 0300 07/27/20 0736 07/27/20 0839   BP: 155/102 (!) 164/97 147/92    Pulse: 60 61 (!) 53 (!) 56   Resp: 16 16 18    Temp: 36.2 °C (97.1 °F) 36.6 °C (97.9 °F) 36.2 °C (97.2 °F)    TempSrc: Temporal Temporal Temporal    SpO2: 96% 98% 96%    Weight:       Height:         Physical Exam    Constitutional: He is oriented to person, place, and time and well-developed, well-nourished, and in no distress. No distress.   Sleeping upon arrival   HENT:   Head: Normocephalic.   Eyes: Pupils are equal, round, and reactive to light.   Neck: Normal range of motion.   Pulmonary/Chest: Effort normal.   Genitourinary:    Genitourinary Comments: Penis erect and firm with some tenderness. Dark ecchymosis along incision site on glans as before, seems to be improving. No active bleeding. Light ecchymosis at base of penile shaft.     Neurological: He is alert and oriented to person, place, and time.   Skin: Skin is warm and dry.   Psychiatric: Affect and judgment normal.   Nursing note and vitals reviewed.       Hematology Chemistry   Lab Results   Component Value Date/Time    WBC 11.6 (H) 07/23/2020 06:35 AM    HEMOGLOBIN 14.6 07/23/2020 06:35 AM    HEMATOCRIT 44.1 07/23/2020 06:35 AM    PLATELETCT 158 (L) 07/23/2020 06:35 AM     Lab Results   Component Value Date/Time    SODIUM 131 (L) 07/23/2020 06:35 AM    POTASSIUM 4.1 07/23/2020 06:35 AM    CHLORIDE 96 07/23/2020 06:35 AM    CO2 25 07/23/2020 06:35 AM    GLUCOSE 195 (H) 07/23/2020 06:35 AM    BUN 25 (H) 07/23/2020 06:35 AM    CREATININE 1.57 (H) 07/23/2020 06:35 AM         Labs not explicitly included in this progress note were reviewed by the author.   Radiology/imaging not explicitly included in this progress note was reviewed by the author.     Core Measures

## 2020-07-27 NOTE — CARE PLAN
Problem: Communication  Goal: The ability to communicate needs accurately and effectively will improve  Outcome: PROGRESSING AS EXPECTED  Note: Will provide therapeutic communication and encourage the pt to express concerns, feelings and ask any questions regarding the current POC.     Problem: Knowledge Deficit  Goal: Knowledge of disease process/condition, treatment plan, diagnostic tests, and medications will improve  Outcome: PROGRESSING AS EXPECTED  Note: Pt educated regarding plan of care and medications. All questions answered.       Problem: Pain Management  Goal: Pain level will decrease to patient's comfort goal  Outcome: PROGRESSING AS EXPECTED  Note: Pt assessed for pain regularly and medicated PRN per MAR as well as provide non pharmacological alternatives for pain management.

## 2020-07-27 NOTE — PROGRESS NOTES
Went to give patient oxycodone and states he doesn't want that at this time and would prefer to try the Tramadol. It appears Mildred MORGAN with urology ordered then discontinued. Paged PA to inquire more information as patient is requesting to try instead of the oxycodone.

## 2020-07-27 NOTE — PROGRESS NOTES
Received call back from Mildred MORGAN informed her of patient requesting Tramadol instead of the Oxycodone and states agreement. Mildred states she will place orders.

## 2020-07-27 NOTE — PROGRESS NOTES
Pt arrived to unit via wheelchair at 1730. Pt oriented to room, unit, and plan of care. Pt reports minimal pain 1/10 pain level in penis area at this time. Pt assessed for pain regularly and medicated PRN per MAR. All questions answered at this time. Call light within reach; fall precautions in place.

## 2020-07-27 NOTE — PROGRESS NOTES
Received bedside report from Dayanara LISA and accepted care of patient. Patient currently resting in bed in no visible or stated signs of distress or discomfort. Pt currently A/O x4 on room air. Pt did express pain on the groin area. Bed in locked and lowest position. Call light and personal possessions within reach. Patient educated about use of call light and verbalized understanding.

## 2020-07-28 VITALS
SYSTOLIC BLOOD PRESSURE: 141 MMHG | DIASTOLIC BLOOD PRESSURE: 90 MMHG | WEIGHT: 210.1 LBS | BODY MASS INDEX: 30.08 KG/M2 | RESPIRATION RATE: 16 BRPM | HEART RATE: 60 BPM | TEMPERATURE: 97.5 F | HEIGHT: 70 IN | OXYGEN SATURATION: 94 %

## 2020-07-28 LAB
ANION GAP SERPL CALC-SCNC: 12 MMOL/L (ref 7–16)
BUN SERPL-MCNC: 25 MG/DL (ref 8–22)
CALCIUM SERPL-MCNC: 9.5 MG/DL (ref 8.5–10.5)
CHLORIDE SERPL-SCNC: 97 MMOL/L (ref 96–112)
CO2 SERPL-SCNC: 26 MMOL/L (ref 20–33)
CREAT SERPL-MCNC: 1.51 MG/DL (ref 0.5–1.4)
GLUCOSE SERPL-MCNC: 193 MG/DL (ref 65–99)
POTASSIUM SERPL-SCNC: 4.6 MMOL/L (ref 3.6–5.5)
SODIUM SERPL-SCNC: 135 MMOL/L (ref 135–145)

## 2020-07-28 PROCEDURE — 80048 BASIC METABOLIC PNL TOTAL CA: CPT

## 2020-07-28 PROCEDURE — A9270 NON-COVERED ITEM OR SERVICE: HCPCS | Performed by: STUDENT IN AN ORGANIZED HEALTH CARE EDUCATION/TRAINING PROGRAM

## 2020-07-28 PROCEDURE — A9270 NON-COVERED ITEM OR SERVICE: HCPCS | Performed by: INTERNAL MEDICINE

## 2020-07-28 PROCEDURE — 700102 HCHG RX REV CODE 250 W/ 637 OVERRIDE(OP): Performed by: INTERNAL MEDICINE

## 2020-07-28 PROCEDURE — 99239 HOSP IP/OBS DSCHRG MGMT >30: CPT | Mod: GC | Performed by: HOSPITALIST

## 2020-07-28 PROCEDURE — 36415 COLL VENOUS BLD VENIPUNCTURE: CPT

## 2020-07-28 PROCEDURE — 700102 HCHG RX REV CODE 250 W/ 637 OVERRIDE(OP): Performed by: STUDENT IN AN ORGANIZED HEALTH CARE EDUCATION/TRAINING PROGRAM

## 2020-07-28 RX ORDER — ATORVASTATIN CALCIUM 40 MG/1
40 TABLET, FILM COATED ORAL
Qty: 90 TAB | Refills: 0 | Status: SHIPPED | OUTPATIENT
Start: 2020-07-28 | End: 2021-04-15 | Stop reason: SDUPTHER

## 2020-07-28 RX ORDER — HYDROCODONE BITARTRATE AND ACETAMINOPHEN 5; 325 MG/1; MG/1
1 TABLET ORAL 2 TIMES DAILY PRN
Qty: 28 TAB | Refills: 0 | Status: SHIPPED | OUTPATIENT
Start: 2020-07-28 | End: 2020-08-11

## 2020-07-28 RX ORDER — TRAMADOL HYDROCHLORIDE 50 MG/1
50 TABLET ORAL EVERY 6 HOURS
Qty: 56 TAB | Refills: 0 | Status: SHIPPED | OUTPATIENT
Start: 2020-07-28 | End: 2020-08-11

## 2020-07-28 RX ORDER — PRASUGREL 10 MG/1
10 TABLET, FILM COATED ORAL DAILY
Qty: 30 TAB | Refills: 2 | Status: SHIPPED | OUTPATIENT
Start: 2020-07-29 | End: 2021-03-10

## 2020-07-28 RX ORDER — CARVEDILOL 25 MG/1
25 TABLET ORAL 2 TIMES DAILY WITH MEALS
Qty: 60 TAB | Refills: 3 | Status: SHIPPED | OUTPATIENT
Start: 2020-07-28 | End: 2021-04-15 | Stop reason: SDUPTHER

## 2020-07-28 RX ORDER — LOSARTAN POTASSIUM 100 MG/1
100 TABLET ORAL DAILY
Qty: 30 TAB | Refills: 2 | Status: SHIPPED | OUTPATIENT
Start: 2020-07-29 | End: 2020-10-09 | Stop reason: SDUPTHER

## 2020-07-28 RX ORDER — ATORVASTATIN CALCIUM 80 MG/1
80 TABLET, FILM COATED ORAL
Qty: 30 TAB | Status: CANCELLED | OUTPATIENT
Start: 2020-07-28

## 2020-07-28 RX ORDER — LOSARTAN POTASSIUM 50 MG/1
100 TABLET ORAL DAILY
Status: DISCONTINUED | OUTPATIENT
Start: 2020-07-29 | End: 2020-07-28 | Stop reason: HOSPADM

## 2020-07-28 RX ORDER — NITROGLYCERIN 0.4 MG/1
0.4 TABLET SUBLINGUAL PRN
Qty: 25 TAB | Refills: 0 | Status: SHIPPED | OUTPATIENT
Start: 2020-07-28 | End: 2022-08-12 | Stop reason: SDUPTHER

## 2020-07-28 RX ADMIN — DOCUSATE SODIUM 50 MG AND SENNOSIDES 8.6 MG 2 TABLET: 8.6; 5 TABLET, FILM COATED ORAL at 04:07

## 2020-07-28 RX ADMIN — TRAMADOL HYDROCHLORIDE 50 MG: 50 TABLET, FILM COATED ORAL at 12:11

## 2020-07-28 RX ADMIN — TRAMADOL HYDROCHLORIDE 50 MG: 50 TABLET, FILM COATED ORAL at 04:01

## 2020-07-28 RX ADMIN — PRASUGREL 10 MG: 10 TABLET, FILM COATED ORAL at 04:07

## 2020-07-28 RX ADMIN — LOSARTAN POTASSIUM 50 MG: 50 TABLET, FILM COATED ORAL at 04:06

## 2020-07-28 RX ADMIN — ACETAMINOPHEN 1000 MG: 500 TABLET ORAL at 09:13

## 2020-07-28 RX ADMIN — ASPIRIN 81 MG: 81 TABLET, COATED ORAL at 04:06

## 2020-07-28 RX ADMIN — CARVEDILOL 25 MG: 25 TABLET, FILM COATED ORAL at 09:13

## 2020-07-28 RX ADMIN — HYDROCODONE BITARTRATE AND ACETAMINOPHEN 1 TABLET: 5; 325 TABLET ORAL at 01:58

## 2020-07-28 NOTE — DISCHARGE SUMMARY
Discharge Summary    Date of Admission: 7/22/2020  Date of Discharge: 07/28/20  Discharging Attending: JOSE JUAN Helms M.D.   Discharging Senior Resident: Dr. Mccurdy  Discharging Intern: Dr. Saab      CHIEF COMPLAINT ON ADMISSION  Chief Complaint   Patient presents with   • Chest Pain     Started 11am this morning, right of sternum, History of NSTEMI in January 2020, two stents placed. HTN in 200's, equal on both arms.       Reason for Admission  Chest pain    Admission Date  7/22/2020    CODE STATUS  Full Code    HPI & HOSPITAL COURSE  This is a 60 y.o. male admitted for further evaluation and management of priapism.       #Priapism  #Chest pain  He woke up 36 hours prior to presenting with an erection which would not resolve. He panicked and started having chest pain for which he called EMS. Etiology unclear. He denied use of vasodilator medications. He has nitroglycerin for CAD, stent placed in January 2020. He reports occasional use of marijuana. He was seen emergently bu Urology and underwent aspiration, irrigation and phenylephrine injection. However his priapism persisted. Pain management was difficult, requiring multiple opioid medications to achieve adequate control.  Per Urology, his priapism could take up to several weeks to resolve. They will follow him in the outpatient. They will consider chemical castration.     #Hypertension  Losartan dose was increased as his BP was difficult to control despite adequate pain control.     #CAD  His Prasugrel was held for surgical intervention. It was resumed later when it was clear that further surgical intervention was not going to be pursued.     #CRISELDA  Resolved with periprocedure IV fluid resuscitation. Complicated by mild hyponatremia, which resolved.     He is discharged in guarded and stable condition to home with close outpatient follow-up.    The patient met 2-midnight criteria for an inpatient stay at the time of discharge.    Discharge  Date  07/28/20      DISCHARGE DIAGNOSES  Principal Problem:    Priapism, unspecified POA: Yes      Overview: s/p repeat corporal aspiration and irrigation of priapism with saline and       phenylephrine under general anesthesia and corpora-cavernosal,       corpora-glandular shunt with snake procedure.   Active Problems:    CRISELDA (acute kidney injury) ([facility]) POA: Yes    Coronary artery disease involving native coronary artery with unstable angina pectoris ([facility]) POA: Yes      Overview: 80% mid, 100% distal LAD      8%mid, 70% ostial first diagonal    Essential hypertension POA: Yes  Resolved Problems:    * No resolved hospital problems. *      FOLLOW UP  Future Appointments   Date Time Provider Department Center   8/4/2020  1:00 PM Wild Don M.D. [address]   8/6/2020  2:15 PM DASH Saba [facility] None   10/2/2020  9:20 AM Wild Don M.D. [address] Gomez M.D.  [address] [facility]  [place] [address]  [phone]    In 1 week      Wild Don M.D.  [address]  [address]  [place] [address]  [phone]    Schedule an appointment as soon as possible for a visit in 1 week        MEDICATIONS ON DISCHARGE     Medication List      START taking these medications      Instructions   HYDROcodone-acetaminophen 5-325 MG Tabs per tablet  Commonly known as:  NORCO   Take 1 Tab by mouth 2 times a day as needed for up to 14 days.  Dose:  1 Tab     tramadol 50 MG Tabs  Commonly known as:  ULTRAM   Take 1 Tab by mouth every 6 hours for 14 days.  Dose:  50 mg        CHANGE how you take these medications      Instructions   atorvastatin 40 MG Tabs  What changed:    · medication strength  · how much to take  · when to take this  Commonly known as:  LIPITOR   Take 1 Tab by mouth every bedtime.  Dose:  40 mg     losartan 100 MG Tabs  Start taking on:  July 29, 2020  What changed:    · medication strength  · how much to take  Commonly known as:  COZAAR   Take 1  Tab by mouth every day.  Dose:  100 mg        CONTINUE taking these medications      Instructions   aspirin EC 81 MG Tbec  Commonly known as:  ECOTRIN   Take 1 Tab by mouth every day.  Dose:  81 mg     carvedilol 25 MG Tabs  Commonly known as:  COREG   Take 1 Tab by mouth 2 times a day, with meals.  Dose:  25 mg     metFORMIN 500 MG Tabs  Commonly known as:  GLUCOPHAGE   Take 1 Tab by mouth 2 times a day, with meals.  Dose:  500 mg     nitroglycerin 0.4 MG Subl  Commonly known as:  NITROSTAT   Place 1 Tab under tongue as needed for Chest Pain (Take 5 minutes apart, if after 2nd dose you still have CP take a 3rd and call 911.).  Dose:  0.4 mg     prasugrel 10 MG Tabs  Start taking on:  2020  Commonly known as:  EFFIENT   Take 1 Tab by mouth every day.  Dose:  10 mg            Allergies  Allergies   Allergen Reactions   • Levofloxacin Unspecified     GI Bleeding         DIET  Orders Placed This Encounter   Procedures   • Diet Order Cardiac     Standing Status:   Standing     Number of Occurrences:   1     Order Specific Question:   Diet:     Answer:   Cardiac [6]   • Discontinue Diet Tray     Standing Status:   Standing     Number of Occurrences:   1       ACTIVITY  As tolerated.  Weight bearing as tolerated    CONSULTATIONS  Dr. Gomez with Urology consulted.  Treatment options were discussed and plan of care agreed upon.    PROCEDURES  : Corporal Aspiration and Irrigation of priapism with saline and phenylephrine       Thfagyil-helkuiyryr-oeuvhmdu glandular shunt with Snake procedure     TOTAL TIME SPENT ON DISCHARGIN mins

## 2020-07-28 NOTE — CARE PLAN
Problem: Pain Management  Goal: Pain level will decrease to patient's comfort goal  Outcome: PROGRESSING AS EXPECTED  Flowsheets (Taken 7/28/2020 0000)  Comfort Goal:   Comfort with Movement   Sleep Comfortably  CNA Pain 0 - 10 Scale: 0  Pain Rating Scale (NPRS): 0  Note: Patient states pain is 0/10.     Problem: Infection  Goal: Will remain free from infection  Outcome: PROGRESSING AS EXPECTED  Note: Patient changing bandages and cleansing as needed. Educated patient on hand hygiene before dressing changes.

## 2020-07-28 NOTE — PROGRESS NOTES
Patient received written/verbal discharge instructions. States understanding of these instructions. IV removed. Hard Rx for pain medications given to this nurse at this time and hand delievered to patient. Patient now has everything required for DC. Awaiting ride. CNA to take patient down to exit when ready.

## 2020-07-28 NOTE — PROGRESS NOTES
norco ineffective for pain control, ice provided until scheduled tramadol available; also staggered tylenol doses in between tramadol. States tramadol effective and finally relief to get some early morning sleep. Would benefit from tramadol increased to qid rather than tid for discharge

## 2020-07-28 NOTE — DISCHARGE INSTRUCTIONS
Discharge Instructions    Discharged to home by car with relative. Discharged via wheelchair, hospital escort: Yes.  Special equipment needed: Not Applicable    Be sure to schedule a follow-up appointment with your primary care doctor or any specialists as instructed.     Discharge Plan:   Diet Plan: Discussed  Activity Level: Discussed  Confirmed Follow up Appointment: Appointment Scheduled  Confirmed Symptoms Management: Discussed  Medication Reconciliation Updated: Yes    I understand that a diet low in cholesterol, fat, and sodium is recommended for good health. Unless I have been given specific instructions below for another diet, I accept this instruction as my diet prescription.     Special Instructions: None    · Is patient discharged on Warfarin / Coumadin?   No     Depression / Suicide Risk    As you are discharged from this [facility] facility, it is important to learn how to keep safe from harming yourself.    Recognize the warning signs:  · Abrupt changes in personality, positive or negative- including increase in energy   · Giving away possessions  · Change in eating patterns- significant weight changes-  positive or negative  · Change in sleeping patterns- unable to sleep or sleeping all the time   · Unwillingness or inability to communicate  · Depression  · Unusual sadness, discouragement and loneliness  · Talk of wanting to die  · Neglect of personal appearance   · Rebelliousness- reckless behavior  · Withdrawal from people/activities they love  · Confusion- inability to concentrate     If you or a loved one observes any of these behaviors or has concerns about self-harm, here's what you can do:  · Talk about it- your feelings and reasons for harming yourself  · Remove any means that you might use to hurt yourself (examples: pills, rope, extension cords, firearm)  · Get professional help from the community (Mental Health, Substance Abuse, psychological counseling)  · Do not be alone:Call your  Safe Contact- someone whom you trust who will be there for you.  · Call your local CRISIS HOTLINE 034-3716 or 889-390-3916  · Call your local Children's Mobile Crisis Response Team [place] (448) 922-1780 or www.[employer]  · Call the toll free National Suicide Prevention Hotlines   · National Suicide Prevention Lifeline 758-097-NIWH (8756)  · National Iddiction Line Network 800-SUICIDE (989-0313)

## 2020-07-28 NOTE — PROGRESS NOTES
Received report from day shift RN. Patient A&Ox4 and on RA, patient states his pain level is a 0 at this time. Bed in locked, lowest position. Call light and belongings are within reach.

## 2020-07-28 NOTE — PROGRESS NOTES
Daily Progress Note:     Date of Service: 7/28/2020  Primary Team: [facility] Team   Attending: Stephen Moncada M.D.   Senior Resident: Dr. Mccurdy  Intern: Dr. Saba  Contact:  [phone]    Chief Complaint:   Priaprism    Subjective:   Conner is a 60 y.o. male with a past medical history that includes unstable angina, prior NSTEMI, Htn, history of elevated A1C, and BPH who presented 7/22/2020 with priapism of 36 hrs duration on presentation and associated non-cardiac chest pain. He is s/p repeat corporal aspiration and irrigation of priapism with saline and phenylephrine under general anesthesia and corpora-cavernosal, corpora-glandular shunt with snake procedure. He is POD 5.   Okay to d/c from Urology. Urology signed off.   Reports BM yesterday and able to urinate without pain  Denies any chest pain, SOB, n/v    Pain mostly controlled. Reports pain of 3/10 this AM. Feels that Tramadol is working but feels effect tapering off towards the later hours. We discussed why he refused Norco. He says that he thought he could only have one or the other, and thought that Tramadol would be better for him. We discussed differences between scheduled and PRN pain control. He expresses understanding of such.     Consultants/Specialty:  Urology     Review of Systems:    Constitutional: Negative for chills and fever.   HENT: Negative for hearing loss and tinnitus.    Eyes: Negative for blurred vision and double vision.   Respiratory: Negative for cough and hemoptysis.    Cardiovascular: Chest pain resolved. Negative for palpitations and orthopnea.   Gastrointestinal: Negative for abdominal pain, blood in stool, heartburn, nausea and vomiting.   Genitourinary: Priaprism. Negative for frequency, hematuria and urgency.   Musculoskeletal: Negative for myalgias and neck pain.   Neurological: Negative for dizziness and tingling.   Psychiatric/Behavioral: Negative for substance abuse.     Objective Data:     Vitals:   Temp:  [36.3  °C (97.4 °F)-36.8 °C (98.3 °F)] 36.4 °C (97.5 °F)  Pulse:  [53-67] 60  Resp:  [16-18] 16  BP: (134-162)/() 141/90  SpO2:  [92 %-96 %] 94 %    Physical Exam  Constitutional:       General: He is not in acute distress.     Appearance: Normal appearance.   HENT:      Head: Normocephalic and atraumatic.      Mouth/Throat:      Mouth: Mucous membranes are moist.   Eyes:      Extraocular Movements: Extraocular movements intact.   Neck:      Musculoskeletal: No neck rigidity or muscular tenderness.   Cardiovascular:      Rate and Rhythm: Normal rate and regular rhythm.      Pulses: Normal pulses.      Heart sounds: Normal heart sounds. No murmur.   Pulmonary:      Effort: Pulmonary effort is normal. No respiratory distress.      Breath sounds: Normal breath sounds.   Abdominal:      General: Bowel sounds are normal. There is no distension.      Palpations: Abdomen is soft.      Tenderness: Not TTP  Musculoskeletal: Normal range of motion.         General: No swelling or tenderness.   Skin:     General: Skin is warm and dry.      Coloration: Skin is not jaundiced.   Neurological:      General: No focal deficit present.      Mental Status: He is alert and oriented to person, place, and time.   Psychiatric:         Mood and Affect: Mood normal.         Behavior: Behavior normal.     : Penis firm, lower angle of erection in comparison to last night. Some tenderness.  Dark ecchymosis along incision site on glans. No active bleeding. Slight ecchymosis at base of penile shaft.      Labs: stable, no new today.     Imaging: no new one today    Problem Representation:  Conner is a 60 y.o. male with a past medical history that includes unstable angina, prior NSTEMI, Htn, history of elevated A1C, and BPH who presented 7/22/2020 with priapism of 36 hrs duration on presentation and associated non-cardiac chest pain. He is s/p repeat corporal aspiration and irrigation of priapism with saline and phenylephrine under general  anesthesia and corpora-cavernosal, corpora-glandular shunt with snake procedure    * Priapism, unspecified- (present on admission)  Overview  s/p repeat corporal aspiration and irrigation of priapism with saline and phenylephrine under general anesthesia and corpora-cavernosal, corpora-glandular shunt with snake procedure.     Assessment & Plan  Stable. Penis still erected and firm, same from yesterday's exam. 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  No repeat surgery for now per urology.     Pain controlled, reports max pain of 3/10 overnight.   Discussed with patient regarding out patient pain regimen. Patient understands scheduled vs PRN medication for pain control. He agrees with Norco as PRN pain control.   Will D/C on Tramadol 50mg QID with Norco 5/325mg q12H PRN  Follow up with urology on 8/4/2020    CRISELDA (acute kidney injury) ([facility])- (present on admission)  Assessment & Plan  RESOLVED    Essential hypertension- (present on admission)  Assessment & Plan   Range 140-150 systolic and 90-100s diastolic while inpatient, most spike would be related to pain  Increase Lorsartan to 100mg for goal <130/90 while outpatient. Follow up with PCP in one week      Dispo: Medically stable for D/C to home.

## 2020-07-28 NOTE — PROGRESS NOTES
Patient AOx4. Pleasant and cooperative with nursing. Pain is well managed at this time with scheduled Tramadol. Penis is most tender on the base of penis towards the scrotum. Patient states that the tissue in the suprapubic region is softer than it had been. This area does feel soft on palpation. Left side of the suprapubic area is tender to palpation. Rounded with [facility] MD and discussed pain mgmt after DC. MD to discuss with team. Drsg changed to penis this AM. Scant serosang drainage. Incision is secured with sutures. Incision is well approximated. Glans of the penis is a purple color but patient states discoloration is much improved. Patient sitting up in bed for breakfast. Able to feed self with setup. Oral intake is adequate.

## 2020-07-29 NOTE — PROGRESS NOTES
Patient has no transportation at this time. Patient continues to make phone calls to try and find a ride. Awaiting patient update. Ready for discharge by staff at anytime.

## 2020-07-29 NOTE — PROGRESS NOTES
Explained to patient that bed is needed for new admit. Cab voucher offered and declined. Patient agreeable to leave room and arrange ride from lobby. Patient actively making phone calls looking for transport.

## 2020-08-25 ENCOUNTER — HOSPITAL ENCOUNTER (OUTPATIENT)
Facility: MEDICAL CENTER | Age: 60
End: 2020-08-25
Attending: UROLOGY
Payer: COMMERCIAL

## 2020-08-25 ENCOUNTER — PRE-ADMISSION TESTING (OUTPATIENT)
Dept: ADMISSIONS | Facility: MEDICAL CENTER | Age: 60
End: 2020-08-25
Payer: COMMERCIAL

## 2020-08-25 LAB
ANION GAP SERPL CALC-SCNC: 13 MMOL/L (ref 7–16)
APTT PPP: 37.4 SEC (ref 24.7–36)
BASOPHILS # BLD AUTO: 0.5 % (ref 0–1.8)
BASOPHILS # BLD: 0.05 K/UL (ref 0–0.12)
BUN SERPL-MCNC: 23 MG/DL (ref 8–22)
CALCIUM SERPL-MCNC: 10 MG/DL (ref 8.5–10.5)
CHLORIDE SERPL-SCNC: 102 MMOL/L (ref 96–112)
CO2 SERPL-SCNC: 25 MMOL/L (ref 20–33)
CREAT SERPL-MCNC: 1.67 MG/DL (ref 0.5–1.4)
EOSINOPHIL # BLD AUTO: 0.07 K/UL (ref 0–0.51)
EOSINOPHIL NFR BLD: 0.7 % (ref 0–6.9)
ERYTHROCYTE [DISTWIDTH] IN BLOOD BY AUTOMATED COUNT: 43.8 FL (ref 35.9–50)
GLUCOSE SERPL-MCNC: 101 MG/DL (ref 65–99)
HCT VFR BLD AUTO: 41.3 % (ref 42–52)
HGB BLD-MCNC: 12.9 G/DL (ref 14–18)
IMM GRANULOCYTES # BLD AUTO: 0.02 K/UL (ref 0–0.11)
IMM GRANULOCYTES NFR BLD AUTO: 0.2 % (ref 0–0.9)
INR PPP: 1.03 (ref 0.87–1.13)
LYMPHOCYTES # BLD AUTO: 2.39 K/UL (ref 1–4.8)
LYMPHOCYTES NFR BLD: 24.9 % (ref 22–41)
MCH RBC QN AUTO: 30.8 PG (ref 27–33)
MCHC RBC AUTO-ENTMCNC: 31.2 G/DL (ref 33.7–35.3)
MCV RBC AUTO: 98.6 FL (ref 81.4–97.8)
MONOCYTES # BLD AUTO: 0.75 K/UL (ref 0–0.85)
MONOCYTES NFR BLD AUTO: 7.8 % (ref 0–13.4)
NEUTROPHILS # BLD AUTO: 6.32 K/UL (ref 1.82–7.42)
NEUTROPHILS NFR BLD: 65.9 % (ref 44–72)
NRBC # BLD AUTO: 0 K/UL
NRBC BLD-RTO: 0 /100 WBC
PLATELET # BLD AUTO: 288 K/UL (ref 164–446)
PMV BLD AUTO: 11.1 FL (ref 9–12.9)
POTASSIUM SERPL-SCNC: 4.8 MMOL/L (ref 3.6–5.5)
PROTHROMBIN TIME: 13.8 SEC (ref 12–14.6)
RBC # BLD AUTO: 4.19 M/UL (ref 4.7–6.1)
SODIUM SERPL-SCNC: 140 MMOL/L (ref 135–145)
WBC # BLD AUTO: 9.6 K/UL (ref 4.8–10.8)

## 2020-08-25 PROCEDURE — 85730 THROMBOPLASTIN TIME PARTIAL: CPT

## 2020-08-25 PROCEDURE — 87086 URINE CULTURE/COLONY COUNT: CPT

## 2020-08-25 PROCEDURE — 80048 BASIC METABOLIC PNL TOTAL CA: CPT

## 2020-08-25 PROCEDURE — 85025 COMPLETE CBC W/AUTO DIFF WBC: CPT

## 2020-08-25 PROCEDURE — 85610 PROTHROMBIN TIME: CPT

## 2020-08-25 NOTE — OR NURSING
Patient was added on for surgery tomorrow.  I called patient and completed RN portion of the preadmit via telephone.  Patient reports that he was at Dr. Gomez's office today and preop testing was done there.  Patient aware per Dr. Gomez's office that he needs to arrive 3 hours prior to surgery 8/26/2020 to have covid test done.  Patient also received instructions from Dr. Gomez's office to hold his 81 mg aspirin tomorrow.  Patient is on Effient 10 mg daily.  Patient did not receive any instructions regarding this.  I called Dr. Gomez's office and left message for them to please call patient back to give further instructions for Effient. Pt understands to follow aspirin/effient instructions per Dr. Gomez.

## 2020-08-26 ENCOUNTER — ANESTHESIA (OUTPATIENT)
Dept: SURGERY | Facility: MEDICAL CENTER | Age: 60
DRG: 988 | End: 2020-08-26
Payer: COMMERCIAL

## 2020-08-26 ENCOUNTER — APPOINTMENT (OUTPATIENT)
Dept: RADIOLOGY | Facility: MEDICAL CENTER | Age: 60
DRG: 988 | End: 2020-08-26
Attending: INTERNAL MEDICINE
Payer: COMMERCIAL

## 2020-08-26 ENCOUNTER — ANESTHESIA EVENT (OUTPATIENT)
Dept: SURGERY | Facility: MEDICAL CENTER | Age: 60
DRG: 988 | End: 2020-08-26
Payer: COMMERCIAL

## 2020-08-26 ENCOUNTER — HOSPITAL ENCOUNTER (INPATIENT)
Facility: MEDICAL CENTER | Age: 60
LOS: 2 days | DRG: 988 | End: 2020-08-28
Attending: UROLOGY | Admitting: UROLOGY
Payer: COMMERCIAL

## 2020-08-26 DIAGNOSIS — G89.18 ACUTE POST-OPERATIVE PAIN: ICD-10-CM

## 2020-08-26 PROBLEM — N18.2 TYPE 2 DIABETES MELLITUS WITH STAGE 2 CHRONIC KIDNEY DISEASE, WITHOUT LONG-TERM CURRENT USE OF INSULIN (HCC): Status: ACTIVE | Noted: 2020-08-26

## 2020-08-26 PROBLEM — E11.22 TYPE 2 DIABETES MELLITUS WITH STAGE 2 CHRONIC KIDNEY DISEASE, WITHOUT LONG-TERM CURRENT USE OF INSULIN (HCC): Status: ACTIVE | Noted: 2020-08-26

## 2020-08-26 LAB
COVID ORDER STATUS COVID19: NORMAL
GLUCOSE BLD-MCNC: 117 MG/DL (ref 65–99)
SARS-COV+SARS-COV-2 AG RESP QL IA.RAPID: NORMAL
SPECIMEN SOURCE: NORMAL

## 2020-08-26 PROCEDURE — 160048 HCHG OR STATISTICAL LEVEL 1-5: Performed by: UROLOGY

## 2020-08-26 PROCEDURE — 700102 HCHG RX REV CODE 250 W/ 637 OVERRIDE(OP): Performed by: ANESTHESIOLOGY

## 2020-08-26 PROCEDURE — 700111 HCHG RX REV CODE 636 W/ 250 OVERRIDE (IP): Performed by: ANESTHESIOLOGY

## 2020-08-26 PROCEDURE — 71045 X-RAY EXAM CHEST 1 VIEW: CPT

## 2020-08-26 PROCEDURE — 700111 HCHG RX REV CODE 636 W/ 250 OVERRIDE (IP)

## 2020-08-26 PROCEDURE — 700101 HCHG RX REV CODE 250: Performed by: ANESTHESIOLOGY

## 2020-08-26 PROCEDURE — 500380 HCHG DRAIN, PENROSE 1/4X12: Performed by: UROLOGY

## 2020-08-26 PROCEDURE — 700105 HCHG RX REV CODE 258: Performed by: UROLOGY

## 2020-08-26 PROCEDURE — 84585 ASSAY OF URINE VMA: CPT

## 2020-08-26 PROCEDURE — 03HY32Z INSERTION OF MONITORING DEVICE INTO UPPER ARTERY, PERCUTANEOUS APPROACH: ICD-10-PCS | Performed by: ANESTHESIOLOGY

## 2020-08-26 PROCEDURE — A9270 NON-COVERED ITEM OR SERVICE: HCPCS | Performed by: UROLOGY

## 2020-08-26 PROCEDURE — 160038 HCHG SURGERY MINUTES - EA ADDL 1 MIN LEVEL 2: Performed by: UROLOGY

## 2020-08-26 PROCEDURE — C1725 CATH, TRANSLUMIN NON-LASER: HCPCS | Performed by: UROLOGY

## 2020-08-26 PROCEDURE — 700102 HCHG RX REV CODE 250 W/ 637 OVERRIDE(OP): Performed by: UROLOGY

## 2020-08-26 PROCEDURE — 700105 HCHG RX REV CODE 258: Performed by: INTERNAL MEDICINE

## 2020-08-26 PROCEDURE — C9803 HOPD COVID-19 SPEC COLLECT: HCPCS | Performed by: UROLOGY

## 2020-08-26 PROCEDURE — 160002 HCHG RECOVERY MINUTES (STAT): Performed by: UROLOGY

## 2020-08-26 PROCEDURE — 82962 GLUCOSE BLOOD TEST: CPT

## 2020-08-26 PROCEDURE — 770022 HCHG ROOM/CARE - ICU (200)

## 2020-08-26 PROCEDURE — A9270 NON-COVERED ITEM OR SERVICE: HCPCS | Performed by: ANESTHESIOLOGY

## 2020-08-26 PROCEDURE — 160036 HCHG PACU - EA ADDL 30 MINS PHASE I: Performed by: UROLOGY

## 2020-08-26 PROCEDURE — 160035 HCHG PACU - 1ST 60 MINS PHASE I: Performed by: UROLOGY

## 2020-08-26 PROCEDURE — 99291 CRITICAL CARE FIRST HOUR: CPT | Performed by: INTERNAL MEDICINE

## 2020-08-26 PROCEDURE — 700111 HCHG RX REV CODE 636 W/ 250 OVERRIDE (IP): Performed by: UROLOGY

## 2020-08-26 PROCEDURE — 0V9S00Z DRAINAGE OF PENIS WITH DRAINAGE DEVICE, OPEN APPROACH: ICD-10-PCS | Performed by: UROLOGY

## 2020-08-26 PROCEDURE — 160027 HCHG SURGERY MINUTES - 1ST 30 MINS LEVEL 2: Performed by: UROLOGY

## 2020-08-26 PROCEDURE — 88305 TISSUE EXAM BY PATHOLOGIST: CPT

## 2020-08-26 PROCEDURE — 0VCS0ZZ EXTIRPATION OF MATTER FROM PENIS, OPEN APPROACH: ICD-10-PCS | Performed by: UROLOGY

## 2020-08-26 PROCEDURE — 501838 HCHG SUTURE GENERAL: Performed by: UROLOGY

## 2020-08-26 PROCEDURE — 160009 HCHG ANES TIME/MIN: Performed by: UROLOGY

## 2020-08-26 PROCEDURE — 87426 SARSCOV CORONAVIRUS AG IA: CPT

## 2020-08-26 PROCEDURE — 0VBT0ZZ EXCISION OF PREPUCE, OPEN APPROACH: ICD-10-PCS | Performed by: UROLOGY

## 2020-08-26 PROCEDURE — 93005 ELECTROCARDIOGRAM TRACING: CPT | Performed by: INTERNAL MEDICINE

## 2020-08-26 PROCEDURE — 700101 HCHG RX REV CODE 250: Performed by: UROLOGY

## 2020-08-26 RX ORDER — NITROGLYCERIN 20 MG/100ML
INJECTION INTRAVENOUS
Status: DISCONTINUED | OUTPATIENT
Start: 2020-08-26 | End: 2020-08-26 | Stop reason: SURG

## 2020-08-26 RX ORDER — ACETAMINOPHEN 500 MG
1000 TABLET ORAL ONCE
Status: DISCONTINUED | OUTPATIENT
Start: 2020-08-26 | End: 2020-08-26 | Stop reason: HOSPADM

## 2020-08-26 RX ORDER — ACETAMINOPHEN 325 MG/1
650 TABLET ORAL EVERY 6 HOURS PRN
Status: DISCONTINUED | OUTPATIENT
Start: 2020-08-26 | End: 2020-08-28 | Stop reason: HOSPADM

## 2020-08-26 RX ORDER — SODIUM CHLORIDE 9 MG/ML
INJECTION, SOLUTION INTRAVENOUS CONTINUOUS
Status: DISCONTINUED | OUTPATIENT
Start: 2020-08-26 | End: 2020-08-27

## 2020-08-26 RX ORDER — LOSARTAN POTASSIUM 50 MG/1
100 TABLET ORAL
Status: COMPLETED | OUTPATIENT
Start: 2020-08-26 | End: 2020-08-26

## 2020-08-26 RX ORDER — CARVEDILOL 25 MG/1
25 TABLET ORAL
Status: COMPLETED | OUTPATIENT
Start: 2020-08-26 | End: 2020-08-26

## 2020-08-26 RX ORDER — OXYCODONE HYDROCHLORIDE 5 MG/1
5 TABLET ORAL
Status: DISCONTINUED | OUTPATIENT
Start: 2020-08-26 | End: 2020-08-28 | Stop reason: HOSPADM

## 2020-08-26 RX ORDER — HYDROMORPHONE HYDROCHLORIDE 1 MG/ML
INJECTION, SOLUTION INTRAMUSCULAR; INTRAVENOUS; SUBCUTANEOUS
Status: COMPLETED
Start: 2020-08-26 | End: 2020-08-26

## 2020-08-26 RX ORDER — PRASUGREL 10 MG/1
10 TABLET, FILM COATED ORAL DAILY
Status: DISCONTINUED | OUTPATIENT
Start: 2020-08-27 | End: 2020-08-28 | Stop reason: HOSPADM

## 2020-08-26 RX ORDER — AMOXICILLIN 250 MG
2 CAPSULE ORAL 2 TIMES DAILY
Status: DISCONTINUED | OUTPATIENT
Start: 2020-08-26 | End: 2020-08-28 | Stop reason: HOSPADM

## 2020-08-26 RX ORDER — SODIUM CHLORIDE, SODIUM LACTATE, POTASSIUM CHLORIDE, CALCIUM CHLORIDE 600; 310; 30; 20 MG/100ML; MG/100ML; MG/100ML; MG/100ML
INJECTION, SOLUTION INTRAVENOUS EVERY 6 HOURS
Status: ACTIVE | OUTPATIENT
Start: 2020-08-26 | End: 2020-08-27

## 2020-08-26 RX ORDER — SODIUM CHLORIDE, SODIUM LACTATE, POTASSIUM CHLORIDE, CALCIUM CHLORIDE 600; 310; 30; 20 MG/100ML; MG/100ML; MG/100ML; MG/100ML
INJECTION, SOLUTION INTRAVENOUS CONTINUOUS
Status: DISCONTINUED | OUTPATIENT
Start: 2020-08-26 | End: 2020-08-26

## 2020-08-26 RX ORDER — LOSARTAN POTASSIUM 50 MG/1
100 TABLET ORAL DAILY
Status: DISCONTINUED | OUTPATIENT
Start: 2020-08-27 | End: 2020-08-28 | Stop reason: HOSPADM

## 2020-08-26 RX ORDER — NITROGLYCERIN 20 MG/100ML
0-200 INJECTION INTRAVENOUS CONTINUOUS
Status: DISCONTINUED | OUTPATIENT
Start: 2020-08-26 | End: 2020-08-27

## 2020-08-26 RX ORDER — BUPIVACAINE HYDROCHLORIDE 5 MG/ML
INJECTION, SOLUTION EPIDURAL; INTRACAUDAL
Status: DISCONTINUED | OUTPATIENT
Start: 2020-08-26 | End: 2020-08-26 | Stop reason: HOSPADM

## 2020-08-26 RX ORDER — ONDANSETRON 2 MG/ML
4 INJECTION INTRAMUSCULAR; INTRAVENOUS EVERY 4 HOURS PRN
Status: DISCONTINUED | OUTPATIENT
Start: 2020-08-26 | End: 2020-08-26

## 2020-08-26 RX ORDER — OXYCODONE HCL 5 MG/5 ML
5 SOLUTION, ORAL ORAL
Status: DISCONTINUED | OUTPATIENT
Start: 2020-08-26 | End: 2020-08-26 | Stop reason: HOSPADM

## 2020-08-26 RX ORDER — HYDROCODONE BITARTRATE AND ACETAMINOPHEN 5; 325 MG/1; MG/1
1-2 TABLET ORAL PRN
Status: DISCONTINUED | OUTPATIENT
Start: 2020-08-26 | End: 2020-08-28

## 2020-08-26 RX ORDER — DIPHENHYDRAMINE HYDROCHLORIDE 50 MG/ML
12.5 INJECTION INTRAMUSCULAR; INTRAVENOUS
Status: DISCONTINUED | OUTPATIENT
Start: 2020-08-26 | End: 2020-08-26 | Stop reason: HOSPADM

## 2020-08-26 RX ORDER — ONDANSETRON 2 MG/ML
4 INJECTION INTRAMUSCULAR; INTRAVENOUS
Status: COMPLETED | OUTPATIENT
Start: 2020-08-26 | End: 2020-08-26

## 2020-08-26 RX ORDER — LABETALOL HYDROCHLORIDE 5 MG/ML
10-20 INJECTION, SOLUTION INTRAVENOUS EVERY 4 HOURS PRN
Status: DISCONTINUED | OUTPATIENT
Start: 2020-08-26 | End: 2020-08-28 | Stop reason: HOSPADM

## 2020-08-26 RX ORDER — KETOROLAC TROMETHAMINE 30 MG/ML
15 INJECTION, SOLUTION INTRAMUSCULAR; INTRAVENOUS EVERY 6 HOURS
Status: DISCONTINUED | OUTPATIENT
Start: 2020-08-27 | End: 2020-08-28 | Stop reason: HOSPADM

## 2020-08-26 RX ORDER — CEFAZOLIN SODIUM 1 G/50ML
1 INJECTION, SOLUTION INTRAVENOUS EVERY 8 HOURS
Status: DISCONTINUED | OUTPATIENT
Start: 2020-08-27 | End: 2020-08-28 | Stop reason: HOSPADM

## 2020-08-26 RX ORDER — LIDOCAINE HYDROCHLORIDE 20 MG/ML
INJECTION, SOLUTION EPIDURAL; INFILTRATION; INTRACAUDAL; PERINEURAL PRN
Status: DISCONTINUED | OUTPATIENT
Start: 2020-08-26 | End: 2020-08-26 | Stop reason: SURG

## 2020-08-26 RX ORDER — ESMOLOL HYDROCHLORIDE 10 MG/ML
INJECTION INTRAVENOUS PRN
Status: DISCONTINUED | OUTPATIENT
Start: 2020-08-26 | End: 2020-08-26 | Stop reason: SURG

## 2020-08-26 RX ORDER — ONDANSETRON 4 MG/1
4 TABLET, ORALLY DISINTEGRATING ORAL EVERY 4 HOURS PRN
Status: DISCONTINUED | OUTPATIENT
Start: 2020-08-26 | End: 2020-08-28 | Stop reason: HOSPADM

## 2020-08-26 RX ORDER — HYDROCODONE BITARTRATE AND ACETAMINOPHEN 5; 325 MG/1; MG/1
1 TABLET ORAL PRN
Status: ON HOLD | COMMUNITY
End: 2020-08-28

## 2020-08-26 RX ORDER — SCOLOPAMINE TRANSDERMAL SYSTEM 1 MG/1
1 PATCH, EXTENDED RELEASE TRANSDERMAL
Status: DISCONTINUED | OUTPATIENT
Start: 2020-08-26 | End: 2020-08-28 | Stop reason: HOSPADM

## 2020-08-26 RX ORDER — NITROGLYCERIN 0.4 MG/1
0.4 TABLET SUBLINGUAL PRN
Status: DISCONTINUED | OUTPATIENT
Start: 2020-08-26 | End: 2020-08-28 | Stop reason: HOSPADM

## 2020-08-26 RX ORDER — OXYCODONE HYDROCHLORIDE 10 MG/1
10 TABLET ORAL
Status: DISCONTINUED | OUTPATIENT
Start: 2020-08-26 | End: 2020-08-28 | Stop reason: HOSPADM

## 2020-08-26 RX ORDER — DEXAMETHASONE SODIUM PHOSPHATE 4 MG/ML
4 INJECTION, SOLUTION INTRA-ARTICULAR; INTRALESIONAL; INTRAMUSCULAR; INTRAVENOUS; SOFT TISSUE
Status: COMPLETED | OUTPATIENT
Start: 2020-08-26 | End: 2020-08-27

## 2020-08-26 RX ORDER — HALOPERIDOL 5 MG/ML
1 INJECTION INTRAMUSCULAR
Status: DISCONTINUED | OUTPATIENT
Start: 2020-08-26 | End: 2020-08-26 | Stop reason: HOSPADM

## 2020-08-26 RX ORDER — HYDROMORPHONE HYDROCHLORIDE 2 MG/ML
1 INJECTION, SOLUTION INTRAMUSCULAR; INTRAVENOUS; SUBCUTANEOUS
Status: DISCONTINUED | OUTPATIENT
Start: 2020-08-26 | End: 2020-08-26 | Stop reason: HOSPADM

## 2020-08-26 RX ORDER — DIPHENHYDRAMINE HYDROCHLORIDE 50 MG/ML
25 INJECTION INTRAMUSCULAR; INTRAVENOUS EVERY 6 HOURS PRN
Status: DISCONTINUED | OUTPATIENT
Start: 2020-08-26 | End: 2020-08-28 | Stop reason: HOSPADM

## 2020-08-26 RX ORDER — LABETALOL HYDROCHLORIDE 5 MG/ML
5 INJECTION, SOLUTION INTRAVENOUS
Status: DISCONTINUED | OUTPATIENT
Start: 2020-08-26 | End: 2020-08-26 | Stop reason: HOSPADM

## 2020-08-26 RX ORDER — LABETALOL HYDROCHLORIDE 5 MG/ML
INJECTION, SOLUTION INTRAVENOUS PRN
Status: DISCONTINUED | OUTPATIENT
Start: 2020-08-26 | End: 2020-08-26 | Stop reason: SURG

## 2020-08-26 RX ORDER — BISACODYL 10 MG
10 SUPPOSITORY, RECTAL RECTAL
Status: DISCONTINUED | OUTPATIENT
Start: 2020-08-26 | End: 2020-08-28 | Stop reason: HOSPADM

## 2020-08-26 RX ORDER — PHENYLEPHRINE HYDROCHLORIDE 10 MG/ML
INJECTION, SOLUTION INTRAMUSCULAR; INTRAVENOUS; SUBCUTANEOUS PRN
Status: DISCONTINUED | OUTPATIENT
Start: 2020-08-26 | End: 2020-08-26 | Stop reason: SURG

## 2020-08-26 RX ORDER — ONDANSETRON 2 MG/ML
4 INJECTION INTRAMUSCULAR; INTRAVENOUS EVERY 4 HOURS PRN
Status: DISCONTINUED | OUTPATIENT
Start: 2020-08-26 | End: 2020-08-28 | Stop reason: HOSPADM

## 2020-08-26 RX ORDER — MORPHINE SULFATE 4 MG/ML
4 INJECTION, SOLUTION INTRAMUSCULAR; INTRAVENOUS
Status: DISCONTINUED | OUTPATIENT
Start: 2020-08-26 | End: 2020-08-28 | Stop reason: HOSPADM

## 2020-08-26 RX ORDER — ONDANSETRON 2 MG/ML
4 INJECTION INTRAMUSCULAR; INTRAVENOUS ONCE
Status: COMPLETED | OUTPATIENT
Start: 2020-08-26 | End: 2020-08-26

## 2020-08-26 RX ORDER — NITROGLYCERIN 20 MG/100ML
0-200 INJECTION INTRAVENOUS
Status: DISCONTINUED | OUTPATIENT
Start: 2020-08-26 | End: 2020-08-26 | Stop reason: HOSPADM

## 2020-08-26 RX ORDER — ENALAPRILAT 1.25 MG/ML
1.25 INJECTION INTRAVENOUS EVERY 6 HOURS PRN
Status: DISCONTINUED | OUTPATIENT
Start: 2020-08-26 | End: 2020-08-28 | Stop reason: HOSPADM

## 2020-08-26 RX ORDER — HALOPERIDOL 5 MG/ML
1 INJECTION INTRAMUSCULAR EVERY 6 HOURS PRN
Status: DISCONTINUED | OUTPATIENT
Start: 2020-08-26 | End: 2020-08-28 | Stop reason: HOSPADM

## 2020-08-26 RX ORDER — CLONIDINE HYDROCHLORIDE 0.1 MG/1
0.1 TABLET ORAL EVERY 6 HOURS PRN
Status: DISCONTINUED | OUTPATIENT
Start: 2020-08-26 | End: 2020-08-27

## 2020-08-26 RX ORDER — PROMETHAZINE HYDROCHLORIDE 25 MG/1
12.5-25 TABLET ORAL EVERY 4 HOURS PRN
Status: DISCONTINUED | OUTPATIENT
Start: 2020-08-26 | End: 2020-08-28 | Stop reason: HOSPADM

## 2020-08-26 RX ORDER — DEXTROSE MONOHYDRATE 25 G/50ML
50 INJECTION, SOLUTION INTRAVENOUS
Status: DISCONTINUED | OUTPATIENT
Start: 2020-08-26 | End: 2020-08-28 | Stop reason: HOSPADM

## 2020-08-26 RX ORDER — CARVEDILOL 6.25 MG/1
25 TABLET ORAL 2 TIMES DAILY WITH MEALS
Status: DISCONTINUED | OUTPATIENT
Start: 2020-08-26 | End: 2020-08-28 | Stop reason: HOSPADM

## 2020-08-26 RX ORDER — OXYCODONE HCL 5 MG/5 ML
10 SOLUTION, ORAL ORAL
Status: DISCONTINUED | OUTPATIENT
Start: 2020-08-26 | End: 2020-08-26 | Stop reason: HOSPADM

## 2020-08-26 RX ORDER — PROCHLORPERAZINE EDISYLATE 5 MG/ML
5-10 INJECTION INTRAMUSCULAR; INTRAVENOUS EVERY 4 HOURS PRN
Status: DISCONTINUED | OUTPATIENT
Start: 2020-08-26 | End: 2020-08-28 | Stop reason: HOSPADM

## 2020-08-26 RX ORDER — ACETAMINOPHEN 650 MG/1
650 SUPPOSITORY RECTAL EVERY 6 HOURS
Status: DISCONTINUED | OUTPATIENT
Start: 2020-08-27 | End: 2020-08-28 | Stop reason: HOSPADM

## 2020-08-26 RX ORDER — POLYETHYLENE GLYCOL 3350 17 G/17G
1 POWDER, FOR SOLUTION ORAL
Status: DISCONTINUED | OUTPATIENT
Start: 2020-08-26 | End: 2020-08-28 | Stop reason: HOSPADM

## 2020-08-26 RX ORDER — PROMETHAZINE HYDROCHLORIDE 25 MG/1
12.5-25 SUPPOSITORY RECTAL EVERY 4 HOURS PRN
Status: DISCONTINUED | OUTPATIENT
Start: 2020-08-26 | End: 2020-08-28 | Stop reason: HOSPADM

## 2020-08-26 RX ORDER — CEFAZOLIN SODIUM 1 G/3ML
INJECTION, POWDER, FOR SOLUTION INTRAMUSCULAR; INTRAVENOUS PRN
Status: DISCONTINUED | OUTPATIENT
Start: 2020-08-26 | End: 2020-08-26 | Stop reason: SURG

## 2020-08-26 RX ORDER — ATORVASTATIN CALCIUM 40 MG/1
40 TABLET, FILM COATED ORAL
Status: DISCONTINUED | OUTPATIENT
Start: 2020-08-26 | End: 2020-08-28 | Stop reason: HOSPADM

## 2020-08-26 RX ADMIN — LOSARTAN POTASSIUM 100 MG: 50 TABLET, FILM COATED ORAL at 17:00

## 2020-08-26 RX ADMIN — ESMOLOL HYDROCHLORIDE 70 MG: 100 INJECTION, SOLUTION INTRAVENOUS at 19:34

## 2020-08-26 RX ADMIN — SODIUM CHLORIDE, POTASSIUM CHLORIDE, SODIUM LACTATE AND CALCIUM CHLORIDE: 600; 310; 30; 20 INJECTION, SOLUTION INTRAVENOUS at 15:30

## 2020-08-26 RX ADMIN — FENTANYL CITRATE 50 MCG: 50 INJECTION INTRAMUSCULAR; INTRAVENOUS at 19:00

## 2020-08-26 RX ADMIN — PROPOFOL 140 MG: 10 INJECTION, EMULSION INTRAVENOUS at 19:34

## 2020-08-26 RX ADMIN — CEFAZOLIN 2 G: 330 INJECTION, POWDER, FOR SOLUTION INTRAMUSCULAR; INTRAVENOUS at 19:00

## 2020-08-26 RX ADMIN — PHENYLEPHRINE HYDROCHLORIDE 100 MCG: 10 INJECTION INTRAVENOUS at 19:48

## 2020-08-26 RX ADMIN — LIDOCAINE HYDROCHLORIDE 100 MG: 20 INJECTION, SOLUTION EPIDURAL; INFILTRATION; INTRACAUDAL at 19:35

## 2020-08-26 RX ADMIN — FENTANYL CITRATE 50 MCG: 50 INJECTION INTRAMUSCULAR; INTRAVENOUS at 20:09

## 2020-08-26 RX ADMIN — HYDROMORPHONE HYDROCHLORIDE 1 MG: 1 INJECTION, SOLUTION INTRAMUSCULAR; INTRAVENOUS; SUBCUTANEOUS at 16:55

## 2020-08-26 RX ADMIN — PROPOFOL 20 MG: 10 INJECTION, EMULSION INTRAVENOUS at 19:01

## 2020-08-26 RX ADMIN — ONDANSETRON 4 MG: 2 INJECTION INTRAMUSCULAR; INTRAVENOUS at 15:55

## 2020-08-26 RX ADMIN — NITROGLYCERIN 40 MCG/MIN: 20 INJECTION INTRAVENOUS at 20:30

## 2020-08-26 RX ADMIN — Medication 100 MG: at 19:34

## 2020-08-26 RX ADMIN — POVIDONE-IODINE 15 ML: 10 SOLUTION TOPICAL at 15:13

## 2020-08-26 RX ADMIN — SODIUM CHLORIDE: 9 INJECTION, SOLUTION INTRAVENOUS at 22:04

## 2020-08-26 RX ADMIN — ONDANSETRON 4 MG: 2 INJECTION INTRAMUSCULAR; INTRAVENOUS at 18:33

## 2020-08-26 RX ADMIN — LABETALOL HYDROCHLORIDE 10 MG: 5 INJECTION, SOLUTION INTRAVENOUS at 19:00

## 2020-08-26 RX ADMIN — NITROGLYCERIN 40 MCG/MIN: 20 INJECTION INTRAVENOUS at 20:03

## 2020-08-26 RX ADMIN — LABETALOL HYDROCHLORIDE 20 MG: 5 INJECTION, SOLUTION INTRAVENOUS at 19:10

## 2020-08-26 RX ADMIN — ONDANSETRON 4 MG: 2 INJECTION INTRAMUSCULAR; INTRAVENOUS at 21:37

## 2020-08-26 RX ADMIN — CARVEDILOL 25 MG: 25 TABLET, FILM COATED ORAL at 17:00

## 2020-08-26 RX ADMIN — MIDAZOLAM 2 MG: 1 INJECTION INTRAMUSCULAR; INTRAVENOUS at 19:00

## 2020-08-26 RX ADMIN — LABETALOL HYDROCHLORIDE 20 MG: 5 INJECTION, SOLUTION INTRAVENOUS at 19:23

## 2020-08-26 ASSESSMENT — ENCOUNTER SYMPTOMS
NECK PAIN: 0
CHILLS: 0
BRUISES/BLEEDS EASILY: 0
PHOTOPHOBIA: 0
DOUBLE VISION: 0
ORTHOPNEA: 0
HEADACHES: 0
NERVOUS/ANXIOUS: 0
SHORTNESS OF BREATH: 0
PALPITATIONS: 0
SENSORY CHANGE: 0
BACK PAIN: 0
WHEEZING: 0
NAUSEA: 1
ABDOMINAL PAIN: 0
FLANK PAIN: 0
FOCAL WEAKNESS: 0
VOMITING: 0
SPUTUM PRODUCTION: 0
SPEECH CHANGE: 0
FEVER: 0
BLURRED VISION: 0
DEPRESSION: 0
COUGH: 0
DIZZINESS: 0

## 2020-08-26 ASSESSMENT — FIBROSIS 4 INDEX
FIB4 SCORE: 1.02
FIB4 SCORE: 1.02

## 2020-08-26 ASSESSMENT — LIFESTYLE VARIABLES: SUBSTANCE_ABUSE: 0

## 2020-08-26 ASSESSMENT — PAIN SCALES - GENERAL: PAIN_LEVEL: 0

## 2020-08-26 NOTE — OR NURSING
Pt hypertensive, Dr Hernandez and Dr Gomez aware. Pt vomiting and diaphoretic, Dr Gomez at bedside, new order for zofran, see MAR. Pt on monitor, ice pack to back of neck, cool cloths to forehead, denies headache. Pt resting comfortably in bed. Call light and belongings in reach. Bed in lowest position.    1630-Dr Hernandez at pt bedside, new orders, see MAR.

## 2020-08-26 NOTE — ANESTHESIA PREPROCEDURE EVALUATION
Relevant Problems   CARDIAC   (+) Ascending aorta dilatation ([facility])   (+) Coronary artery disease involving native coronary artery with unstable angina pectoris ([facility])   (+) Essential hypertension   (+) Essential hypertension, benign   (+) NSTEMI (non-ST elevated myocardial infarction) ([facility])   (+) Premature atrial complex         (+) CRISELDA (acute kidney injury) ([facility])   (+) Kidney stone   (+) Renal cyst   (+) Renal insufficiency syndrome       Physical Exam    Airway   Mallampati: II  TM distance: >3 FB  Neck ROM: full       Cardiovascular - normal exam  Rhythm: regular  Rate: normal  (-) murmur  Comments: Hypertensive crisis   Dental - normal exam           Pulmonary - normal exam  Breath sounds clear to auscultation     Abdominal    Neurological - normal exam               Anesthesia Plan    ASA 4 (hypertensive crisis)   ASA physical status 4 criteria: other (comment)    Plan - general       Airway plan will be ETT  (Arterial line)      Induction: intravenous    Postoperative Plan: Postoperative administration of opioids is intended.    Pertinent diagnostic labs and testing reviewed    Informed Consent:  Emergent - Consent given by clinician  Anesthetic plan and risks discussed with patient.    Use of blood products discussed with: patient whom consented to blood products.

## 2020-08-27 LAB
ALBUMIN SERPL BCP-MCNC: 3.7 G/DL (ref 3.2–4.9)
ALBUMIN SERPL BCP-MCNC: 4 G/DL (ref 3.2–4.9)
ALBUMIN/GLOB SERPL: 1.2 G/DL
ALBUMIN/GLOB SERPL: 1.4 G/DL
ALP SERPL-CCNC: 74 U/L (ref 30–99)
ALP SERPL-CCNC: 78 U/L (ref 30–99)
ALT SERPL-CCNC: 14 U/L (ref 2–50)
ALT SERPL-CCNC: 15 U/L (ref 2–50)
ANION GAP SERPL CALC-SCNC: 14 MMOL/L (ref 7–16)
ANION GAP SERPL CALC-SCNC: 15 MMOL/L (ref 7–16)
AST SERPL-CCNC: 12 U/L (ref 12–45)
AST SERPL-CCNC: 15 U/L (ref 12–45)
BASOPHILS # BLD AUTO: 0.2 % (ref 0–1.8)
BASOPHILS # BLD: 0.02 K/UL (ref 0–0.12)
BILIRUB SERPL-MCNC: 0.6 MG/DL (ref 0.1–1.5)
BILIRUB SERPL-MCNC: 0.7 MG/DL (ref 0.1–1.5)
BUN SERPL-MCNC: 23 MG/DL (ref 8–22)
BUN SERPL-MCNC: 25 MG/DL (ref 8–22)
CALCIUM SERPL-MCNC: 9 MG/DL (ref 8.5–10.5)
CALCIUM SERPL-MCNC: 9.1 MG/DL (ref 8.5–10.5)
CHLORIDE SERPL-SCNC: 98 MMOL/L (ref 96–112)
CHLORIDE SERPL-SCNC: 99 MMOL/L (ref 96–112)
CO2 SERPL-SCNC: 20 MMOL/L (ref 20–33)
CO2 SERPL-SCNC: 20 MMOL/L (ref 20–33)
CREAT SERPL-MCNC: 1.38 MG/DL (ref 0.5–1.4)
CREAT SERPL-MCNC: 1.65 MG/DL (ref 0.5–1.4)
EKG IMPRESSION: NORMAL
EOSINOPHIL # BLD AUTO: 0.02 K/UL (ref 0–0.51)
EOSINOPHIL NFR BLD: 0.2 % (ref 0–6.9)
ERYTHROCYTE [DISTWIDTH] IN BLOOD BY AUTOMATED COUNT: 39.8 FL (ref 35.9–50)
GLOBULIN SER CALC-MCNC: 2.9 G/DL (ref 1.9–3.5)
GLOBULIN SER CALC-MCNC: 3.1 G/DL (ref 1.9–3.5)
GLUCOSE BLD-MCNC: 109 MG/DL (ref 65–99)
GLUCOSE BLD-MCNC: 142 MG/DL (ref 65–99)
GLUCOSE BLD-MCNC: 150 MG/DL (ref 65–99)
GLUCOSE BLD-MCNC: 161 MG/DL (ref 65–99)
GLUCOSE BLD-MCNC: 175 MG/DL (ref 65–99)
GLUCOSE SERPL-MCNC: 122 MG/DL (ref 65–99)
GLUCOSE SERPL-MCNC: 125 MG/DL (ref 65–99)
HCT VFR BLD AUTO: 35.1 % (ref 42–52)
HGB BLD-MCNC: 11.7 G/DL (ref 14–18)
IMM GRANULOCYTES # BLD AUTO: 0.05 K/UL (ref 0–0.11)
IMM GRANULOCYTES NFR BLD AUTO: 0.4 % (ref 0–0.9)
LYMPHOCYTES # BLD AUTO: 1.1 K/UL (ref 1–4.8)
LYMPHOCYTES NFR BLD: 9.6 % (ref 22–41)
MAGNESIUM SERPL-MCNC: 1.6 MG/DL (ref 1.5–2.5)
MCH RBC QN AUTO: 30.5 PG (ref 27–33)
MCHC RBC AUTO-ENTMCNC: 33.3 G/DL (ref 33.7–35.3)
MCV RBC AUTO: 91.6 FL (ref 81.4–97.8)
MONOCYTES # BLD AUTO: 0.57 K/UL (ref 0–0.85)
MONOCYTES NFR BLD AUTO: 5 % (ref 0–13.4)
NEUTROPHILS # BLD AUTO: 9.65 K/UL (ref 1.82–7.42)
NEUTROPHILS NFR BLD: 84.6 % (ref 44–72)
NRBC # BLD AUTO: 0 K/UL
NRBC BLD-RTO: 0 /100 WBC
PATHOLOGY CONSULT NOTE: NORMAL
PLATELET # BLD AUTO: 273 K/UL (ref 164–446)
PMV BLD AUTO: 10.9 FL (ref 9–12.9)
POTASSIUM SERPL-SCNC: 4.1 MMOL/L (ref 3.6–5.5)
POTASSIUM SERPL-SCNC: 4.2 MMOL/L (ref 3.6–5.5)
PROT SERPL-MCNC: 6.8 G/DL (ref 6–8.2)
PROT SERPL-MCNC: 6.9 G/DL (ref 6–8.2)
RBC # BLD AUTO: 3.83 M/UL (ref 4.7–6.1)
SODIUM SERPL-SCNC: 132 MMOL/L (ref 135–145)
SODIUM SERPL-SCNC: 134 MMOL/L (ref 135–145)
TROPONIN T SERPL-MCNC: 19 NG/L (ref 6–19)
WBC # BLD AUTO: 11.4 K/UL (ref 4.8–10.8)

## 2020-08-27 PROCEDURE — 700102 HCHG RX REV CODE 250 W/ 637 OVERRIDE(OP): Performed by: INTERNAL MEDICINE

## 2020-08-27 PROCEDURE — 700101 HCHG RX REV CODE 250: Performed by: INTERNAL MEDICINE

## 2020-08-27 PROCEDURE — A9270 NON-COVERED ITEM OR SERVICE: HCPCS | Performed by: UROLOGY

## 2020-08-27 PROCEDURE — 700111 HCHG RX REV CODE 636 W/ 250 OVERRIDE (IP): Performed by: INTERNAL MEDICINE

## 2020-08-27 PROCEDURE — 99291 CRITICAL CARE FIRST HOUR: CPT | Performed by: INTERNAL MEDICINE

## 2020-08-27 PROCEDURE — A9270 NON-COVERED ITEM OR SERVICE: HCPCS | Performed by: HOSPITALIST

## 2020-08-27 PROCEDURE — 700102 HCHG RX REV CODE 250 W/ 637 OVERRIDE(OP): Performed by: HOSPITALIST

## 2020-08-27 PROCEDURE — 85025 COMPLETE CBC W/AUTO DIFF WBC: CPT

## 2020-08-27 PROCEDURE — 700105 HCHG RX REV CODE 258

## 2020-08-27 PROCEDURE — 82962 GLUCOSE BLOOD TEST: CPT

## 2020-08-27 PROCEDURE — 83735 ASSAY OF MAGNESIUM: CPT

## 2020-08-27 PROCEDURE — 700102 HCHG RX REV CODE 250 W/ 637 OVERRIDE(OP): Performed by: UROLOGY

## 2020-08-27 PROCEDURE — 93010 ELECTROCARDIOGRAM REPORT: CPT | Performed by: INTERNAL MEDICINE

## 2020-08-27 PROCEDURE — 700111 HCHG RX REV CODE 636 W/ 250 OVERRIDE (IP): Performed by: UROLOGY

## 2020-08-27 PROCEDURE — 84484 ASSAY OF TROPONIN QUANT: CPT

## 2020-08-27 PROCEDURE — A9270 NON-COVERED ITEM OR SERVICE: HCPCS | Performed by: INTERNAL MEDICINE

## 2020-08-27 PROCEDURE — 99233 SBSQ HOSP IP/OBS HIGH 50: CPT | Performed by: HOSPITALIST

## 2020-08-27 PROCEDURE — 80053 COMPREHEN METABOLIC PANEL: CPT

## 2020-08-27 PROCEDURE — 770006 HCHG ROOM/CARE - MED/SURG/GYN SEMI*

## 2020-08-27 RX ORDER — AMLODIPINE BESYLATE 10 MG/1
5 TABLET ORAL
Status: DISCONTINUED | OUTPATIENT
Start: 2020-08-27 | End: 2020-08-28 | Stop reason: HOSPADM

## 2020-08-27 RX ORDER — SODIUM CHLORIDE 9 MG/ML
INJECTION, SOLUTION INTRAVENOUS
Status: COMPLETED
Start: 2020-08-27 | End: 2020-08-27

## 2020-08-27 RX ORDER — CLONIDINE HYDROCHLORIDE 0.1 MG/1
0.2 TABLET ORAL EVERY 6 HOURS PRN
Status: DISCONTINUED | OUTPATIENT
Start: 2020-08-27 | End: 2020-08-28 | Stop reason: HOSPADM

## 2020-08-27 RX ADMIN — LABETALOL HYDROCHLORIDE 10 MG: 5 INJECTION INTRAVENOUS at 18:27

## 2020-08-27 RX ADMIN — ONDANSETRON 4 MG: 2 INJECTION INTRAMUSCULAR; INTRAVENOUS at 08:25

## 2020-08-27 RX ADMIN — KETOROLAC TROMETHAMINE 15 MG: 30 INJECTION, SOLUTION INTRAMUSCULAR at 06:14

## 2020-08-27 RX ADMIN — PROCHLORPERAZINE EDISYLATE 10 MG: 5 INJECTION INTRAMUSCULAR; INTRAVENOUS at 18:33

## 2020-08-27 RX ADMIN — CEFAZOLIN SODIUM 1 G: 1 INJECTION, SOLUTION INTRAVENOUS at 21:16

## 2020-08-27 RX ADMIN — CEFAZOLIN SODIUM 1 G: 1 INJECTION, SOLUTION INTRAVENOUS at 09:00

## 2020-08-27 RX ADMIN — INSULIN HUMAN 1 UNITS: 100 INJECTION, SOLUTION PARENTERAL at 08:38

## 2020-08-27 RX ADMIN — KETOROLAC TROMETHAMINE 15 MG: 30 INJECTION, SOLUTION INTRAMUSCULAR at 16:33

## 2020-08-27 RX ADMIN — ENOXAPARIN SODIUM 40 MG: 40 INJECTION SUBCUTANEOUS at 06:13

## 2020-08-27 RX ADMIN — ONDANSETRON 4 MG: 2 INJECTION INTRAMUSCULAR; INTRAVENOUS at 16:35

## 2020-08-27 RX ADMIN — LABETALOL HYDROCHLORIDE 10 MG: 5 INJECTION INTRAVENOUS at 17:13

## 2020-08-27 RX ADMIN — ENALAPRILAT 1.25 MG: 1.25 INJECTION INTRAVENOUS at 17:00

## 2020-08-27 RX ADMIN — DEXAMETHASONE SODIUM PHOSPHATE 4 MG: 4 INJECTION, SOLUTION INTRA-ARTICULAR; INTRALESIONAL; INTRAMUSCULAR; INTRAVENOUS; SOFT TISSUE at 05:07

## 2020-08-27 RX ADMIN — HYDROCODONE BITARTRATE AND ACETAMINOPHEN 1 TABLET: 5; 325 TABLET ORAL at 14:08

## 2020-08-27 RX ADMIN — KETOROLAC TROMETHAMINE 15 MG: 30 INJECTION, SOLUTION INTRAMUSCULAR at 00:25

## 2020-08-27 RX ADMIN — LOSARTAN POTASSIUM 100 MG: 50 TABLET, FILM COATED ORAL at 06:15

## 2020-08-27 RX ADMIN — OXYCODONE 5 MG: 5 TABLET ORAL at 05:00

## 2020-08-27 RX ADMIN — PRASUGREL 10 MG: 10 TABLET, FILM COATED ORAL at 16:35

## 2020-08-27 RX ADMIN — METFORMIN HYDROCHLORIDE 500 MG: 500 TABLET ORAL at 06:15

## 2020-08-27 RX ADMIN — HYDROCODONE BITARTRATE AND ACETAMINOPHEN 1 TABLET: 5; 325 TABLET ORAL at 10:09

## 2020-08-27 RX ADMIN — INSULIN HUMAN 1 UNITS: 100 INJECTION, SOLUTION PARENTERAL at 21:29

## 2020-08-27 RX ADMIN — AMLODIPINE BESYLATE 5 MG: 10 TABLET ORAL at 19:21

## 2020-08-27 RX ADMIN — ACETAMINOPHEN 650 MG: 325 TABLET, FILM COATED ORAL at 16:35

## 2020-08-27 RX ADMIN — SODIUM CHLORIDE 500 ML: 9 INJECTION, SOLUTION INTRAVENOUS at 21:52

## 2020-08-27 RX ADMIN — HYDROCODONE BITARTRATE AND ACETAMINOPHEN 1 TABLET: 5; 325 TABLET ORAL at 16:52

## 2020-08-27 RX ADMIN — CEFAZOLIN SODIUM 1 G: 1 INJECTION, SOLUTION INTRAVENOUS at 15:51

## 2020-08-27 RX ADMIN — ATORVASTATIN CALCIUM 40 MG: 40 TABLET, FILM COATED ORAL at 21:16

## 2020-08-27 RX ADMIN — CARVEDILOL 25 MG: 25 TABLET, FILM COATED ORAL at 06:15

## 2020-08-27 RX ADMIN — CLONIDINE HYDROCHLORIDE 0.2 MG: 0.1 TABLET ORAL at 18:40

## 2020-08-27 RX ADMIN — ASPIRIN 81 MG: 81 TABLET, COATED ORAL at 06:14

## 2020-08-27 RX ADMIN — CEFAZOLIN SODIUM 1 G: 1 INJECTION, SOLUTION INTRAVENOUS at 01:05

## 2020-08-27 RX ADMIN — KETOROLAC TROMETHAMINE 15 MG: 30 INJECTION, SOLUTION INTRAMUSCULAR at 13:00

## 2020-08-27 RX ADMIN — CARVEDILOL 25 MG: 25 TABLET, FILM COATED ORAL at 20:00

## 2020-08-27 ASSESSMENT — ENCOUNTER SYMPTOMS
COUGH: 0
BACK PAIN: 0
PHOTOPHOBIA: 0
CHILLS: 0
SHORTNESS OF BREATH: 0
FEVER: 0
PALPITATIONS: 0
FOCAL WEAKNESS: 0
NERVOUS/ANXIOUS: 0
DIZZINESS: 0
BLURRED VISION: 0
HEADACHES: 0
FLANK PAIN: 0
WHEEZING: 0
DOUBLE VISION: 0
BRUISES/BLEEDS EASILY: 0
NAUSEA: 0
SPEECH CHANGE: 0
SENSORY CHANGE: 0
VOMITING: 0
ORTHOPNEA: 0
NAUSEA: 1
SPUTUM PRODUCTION: 0
DEPRESSION: 0
NECK PAIN: 0
ABDOMINAL PAIN: 0

## 2020-08-27 ASSESSMENT — COGNITIVE AND FUNCTIONAL STATUS - GENERAL
SUGGESTED CMS G CODE MODIFIER MOBILITY: CI
WALKING IN HOSPITAL ROOM: A LITTLE
MOBILITY SCORE: 23
SUGGESTED CMS G CODE MODIFIER DAILY ACTIVITY: CH
DAILY ACTIVITIY SCORE: 24

## 2020-08-27 ASSESSMENT — COPD QUESTIONNAIRES
IN THE PAST 12 MONTHS DO YOU DO LESS THAN YOU USED TO BECAUSE OF YOUR BREATHING PROBLEMS: DISAGREE/UNSURE
COPD SCREENING SCORE: 4
HAVE YOU SMOKED AT LEAST 100 CIGARETTES IN YOUR ENTIRE LIFE: YES
DO YOU EVER COUGH UP ANY MUCUS OR PHLEGM?: NO/ONLY WITH OCCASIONAL COLDS OR INFECTIONS
DURING THE PAST 4 WEEKS HOW MUCH DID YOU FEEL SHORT OF BREATH: NONE/LITTLE OF THE TIME

## 2020-08-27 ASSESSMENT — LIFESTYLE VARIABLES
EVER_SMOKED: YES
TOTAL SCORE: 0
SUBSTANCE_ABUSE: 0
EVER HAD A DRINK FIRST THING IN THE MORNING TO STEADY YOUR NERVES TO GET RID OF A HANGOVER: NO
HOW MANY TIMES IN THE PAST YEAR HAVE YOU HAD 5 OR MORE DRINKS IN A DAY: 10
ALCOHOL_USE: YES
CONSUMPTION TOTAL: POSITIVE
TOTAL SCORE: 0
TOTAL SCORE: 0
HAVE PEOPLE ANNOYED YOU BY CRITICIZING YOUR DRINKING: NO
DOES PATIENT WANT TO STOP DRINKING: NO
HAVE YOU EVER FELT YOU SHOULD CUT DOWN ON YOUR DRINKING: NO
EVER FELT BAD OR GUILTY ABOUT YOUR DRINKING: NO
AVERAGE NUMBER OF DAYS PER WEEK YOU HAVE A DRINK CONTAINING ALCOHOL: 5
ON A TYPICAL DAY WHEN YOU DRINK ALCOHOL HOW MANY DRINKS DO YOU HAVE: 3

## 2020-08-27 ASSESSMENT — PATIENT HEALTH QUESTIONNAIRE - PHQ9
2. FEELING DOWN, DEPRESSED, IRRITABLE, OR HOPELESS: NOT AT ALL
SUM OF ALL RESPONSES TO PHQ9 QUESTIONS 1 AND 2: 0
1. LITTLE INTEREST OR PLEASURE IN DOING THINGS: NOT AT ALL

## 2020-08-27 NOTE — OP REPORT
DATE OF SERVICE:  08/26/2020    PREOPERATIVE DIAGNOSES:  1.  History of prolonged priapism, status post glandular-corporal cavernosal   to corporal spongiosal shunt with snake procedure on 07/22/2020.  2.  Distal glandular eschar and incisional wound eschar with necrotic tissue.  3.  Borderline diabetes mellitus.  4.  Significant hypertension with atherosclerotic coronary artery disease.    OPERATION AND PROCEDURES PERFORMED:  Emergent debridement of glandular   necrotic tissue in the left glans and debridement of glandular incisional   wound with placement of corporal cavernosal drains.    SURGEON:  Homer Gomez MD    ANESTHESIA:  General endotracheal.    ANESTHESIOLOGIST:  Romaine Hernandez MD    POSTOPERATIVE DIAGNOSES:  1.  History of prolonged priapism, status post glandular-corporal cavernosal   to corporal spongiosal shunt with snake procedure on 07/22/2020.  2.  Distal glandular eschar and incisional wound eschar with necrotic tissue.  3.  Borderline diabetes mellitus.  4.  Significant hypertension with atherosclerotic coronary artery disease.    COMPLICATIONS:  None.    DRAINS:  Quarter-inch Penrose in the left and right corporal cavernosal   bodies.    SPECIMENS:  Necrotic tissue to pathology for permanent section.    INDICATIONS:  The patient is a 60-year-old male with a history of significant   hypertension, atherosclerotic coronary artery disease and prolonged priapism,   status post a priapism surgery which resulted in some detumescence, but   essentially a fibrotic corpora cavernosa were noted in both bodies.  The   patient underwent a snake procedure with corporal-cavernosal and   corporal-glandular spongiosal shunt back in 07/2020.  Postoperatively, he did   well initially, but he now has developed necrosis and pain and swelling in the   glans  He was improving, but this started approximately 40 hours prior to   surgery.  There is an area of eschar on the left glans with necrotic tissue   and an area  at the base of the penis with necrotic tissue with new swelling.    Patient was started on antibiotics and I discussed with the patient the   importance of urgent debridement and prior to surgery, he had significant   hypertension, anesthesia was concerned about proceeding with surgery; and   explained the emergent nature of the procedure given the developments over the   last several days and the fact that there is evidence of necrosis on the   glans and the base of the wound.  I recommended surgery in hopes of avoiding   any amputation of the phallus and the patient is aware of the risk medically   and surgically of the procedure including but not limited to risk of worsening   infection requiring reoperation.  He is aware of the potential risk of   urinary tract infection, the risk of postoperative pain, swelling, the   potential need for additional surgery including the possibility of partial   penectomy.  I have also discussed with the patient the fact that his wound   healing will be delayed due to his underlying vascular disease.  We have also   discussed the need for wound care as well as postoperative pain management and   we have discussed the fact previously when I met him that he is very unlikely   to have spontaneous erections moving forward.  In addition, we discussed the   perioperative risk of deep vein thrombosis, pulmonary embolism, aspiration   pneumonia, heart attack, stroke and death and the fact that heart attack and   stroke are at high increased risk due to his current high blood pressure.    DESCRIPTION IN DETAIL:  After informed consent was obtained, the patient was   brought to the operating room, placed supine.  Bilateral sequential   compression devices were in place.  A general anesthetic was administered with   an endotracheal tube in a balanced and methodical fashion by Dr. Romaine Hernandez   for patient's safety.  Medications are administered decreasing anxiety as well   as blood pressure  slowly.  Endotracheal tube was positioned and an arterial   line was placed due to noninvasive blood pressures in the 180-200 range.  Once   the patient was stable, the operative area was Betadine prepped and draped in   usual sterile fashion.  A surgical timeout was called.  All members of the   operative team agreed as to the patient's name, procedure to be performed   without objections, attention was directed towards procedure.    I began the procedure by performing a penile block; 0.25% plain Marcaine was   injected circumferentially at the base of the penis, approximately 10 mL were   placed at the beginning of the case,  I then proceeded to dissect the distal   left glans and eschar was noted.  There was some exudate underneath it.  All   of this was debrided to healthy glans.  There was a small amount of bleeding   noted on the frenulum area and the meatus was patent.  At this point in time,   attention was directed to the surgical scar which had dehisced.  The wound had   opened.  There was a necrotic nonviable tissue both proximally and distally   which was debrided with cutting current with the cautery.  I then was able to   identify where the corporal cavernosal to glandular shunt had been performed.    The corporal bodies were opened.  I placed Metzenbaum scissors into each to   make sure there was not a corporal cavernositis.  There was some gelatinous   old clot on the left side, just a small amount of dark blood on the right   side.  This was irrigated copiously.  The bed of the resection was performed.    At this point in time, to avoid a corporal cavernositis moving forward, I   placed a quarter-inch Penrose in each corporal body.  These were brought out   and secured to the glans on the proximal side where there was some blood   supply noted with 4-0 nylon sutures.  A total of 2 on each side, to keep these   in place.  I then took a single 3-0 chromic in the midline and reapproximated   the tissue  to hopefully facilitate wound healing and at the end of the case,   an additional 10 mL of 0.25% Marcaine was injected at the base of the penis.    A Xeroform dressing was cut around the drain leaving opening for the meatus   for voiding and then 4x4's were positioned dorsally and ventrally and ACE tape   was applied softly to avoid any compression.  At the end of the case, sponge,   instrument and needle counts were correct x2.  The patient had tolerated the   procedure well.  He was awakened in the operating room and transferred to the   recovery room where he arrived in stable condition, however, still quite   hypertensive and the hospitalist and intensivist were consulted for managing   his postoperative medical care.       ____________________________________     MD RO Young / RUTH    DD:  08/27/2020 07:03:34  DT:  08/27/2020 07:46:27    D#:  [id number]  Job#:  [id number]

## 2020-08-27 NOTE — ANESTHESIA TIME REPORT
Anesthesia Start and Stop Event Times     Date Time Event    8/26/2020 1845 Ready for Procedure     1900 Anesthesia Start     2025 Anesthesia Stop        Responsible Staff  08/26/20    Name Role Begin End    Romaine Hernandez M.D. Anesth 1900 2025        Preop Diagnosis (Free Text):  Pre-op Diagnosis     ISCHEMIC PRIAPISM        Preop Diagnosis (Codes):    Post op Diagnosis  Other priapism  ischemic priapism    Premium Reason  A. 3PM - 7AM    Comments:

## 2020-08-27 NOTE — ANESTHESIA PROCEDURE NOTES
Arterial Line  Performed by: Romaine Hernandez M.D.  Authorized by: Romaine Hernandez M.D.     Start Time:  8/26/2020 7:30 PM  End Time:  8/26/2020 7:36 PM  Localization: surface landmarks    Patient Location:  OR  Indication: continuous blood pressure monitoring        Catheter Size:  20 G  Seldinger Technique?: Yes    Laterality:  Left  Site:  Radial artery  Line Secured:  Transparent dressing and antimicrobial disc  Events: patient tolerated procedure well with no complications

## 2020-08-27 NOTE — CONSULTS
Critical Care Consultation    Date of consult: 8/26/2020    Referring Physician  Homer Gomez M.D.    Reason for Consultation  Hypertensive urgency    History of Presenting Illness  60 y.o. male who presented 8/26/2020 with a past medical history significant for poorly controlled hypertension, diabetes and coronary artery disease who had undergone an aspiration and irrigation of priapism, bgknkyfk-ribgczhzkq-[id number] glandular shunt of the penis on 7/22 for persistent low flow ischemic priapism.  He was discharged home and followed up in clinic with Dr. Gomez where he had been experiencing significant pain and changes in his foreskin since the last visit.  He was scheduled for surgery today for debridement of penile glans under general anesthesia.  Patient did not take his antihypertensives this morning preoperatively and was found to be significantly hypertensive with a blood pressure of 220/140 preoperatively.  Anesthesia discussed concerns with urology but the surgery proceeded given its emergent nature to prevent loss of patient's penis.  Patient was given Coreg and Cozaar preoperatively.  Intraoperatively an arterial catheter was placed and patient had adequate blood pressure control using an esmolol drip and labetalol pushes.  Patient underwent an uncomplicated emergent debridement of penile glans with drainage of bilateral corporal bodies.  Postoperatively however patient's blood pressure increased again with a systolic greater than 200 despite adequate pain control.  He had nausea requiring Zofran but was denying any chest pain, shortness of breath, headache, vision changes.  Anesthesiology started the patient on a nitroglycerin drip for blood pressure control and I was consulted for ongoing critical care management and transfer to intensive care unit.  Patient reports having prior surgeries without significant difficulty in his blood pressure and states he normally has a blood pressure of 140/80 when he  measures it at home while taking his scheduled antihypertensives.  There was no report of flushing or diaphoresis during surgery.    8/27 continue home meds, stop nitro gtt, CRISELDA; T2F if SBP < 180 off nitro    Reviewed last 24 hour events:  Awake and alert  Pain controlled  Nasal cannula oxygen  Making good urine          Code Status  Full Code    Review of Systems  Review of Systems   Constitutional: Negative for chills, fever and malaise/fatigue.   HENT: Negative for congestion and nosebleeds.    Eyes: Negative for blurred vision, double vision and photophobia.   Respiratory: Negative for cough, sputum production, shortness of breath and wheezing.    Cardiovascular: Negative for chest pain, palpitations, orthopnea and leg swelling.   Gastrointestinal: Positive for nausea. Negative for abdominal pain, melena and vomiting.   Genitourinary: Negative for flank pain.   Musculoskeletal: Negative for back pain and neck pain.   Skin: Negative for rash.   Neurological: Negative for dizziness, sensory change, speech change, focal weakness and headaches.   Endo/Heme/Allergies: Does not bruise/bleed easily.   Psychiatric/Behavioral: Negative for depression and substance abuse. The patient is not nervous/anxious.    All other systems reviewed and are negative.      Past Medical History   has a past medical history of CAD (coronary artery disease), Cataract, Diabetes (HCC), Hypertension, Sleep apnea, and Snoring.    Surgical History   has a past surgical history that includes other cardiac surgery (01/10/2020); sphincter prosthesis placement (7/22/2020); and other (Bilateral). -Placement of a drug-eluting stent to the mid circumflex 1/10/2020    Family History  family history includes Cancer in his father, mother, paternal grandfather, and sister; Glaucoma in his mother.    Social History   reports that he has never smoked. He has never used smokeless tobacco. He reports previous alcohol use. He reports current drug use. Drug:  Inhaled.    Medications  Home Medications     Reviewed by Juliana Segura R.N. (Registered Nurse) on 08/26/20 at 1744  Med List Status: Not Addressed   Medication Last Dose Status   acetaminophen (TYLENOL) tablet 1,000 mg  Active   aspirin EC (ECOTRIN) 81 MG Tablet Delayed Response 8/25/2020 Active   atorvastatin (LIPITOR) 40 MG Tab 8/25/2020 Active   carvedilol (COREG) 25 MG Tab 8/26/2020 Active   HYDROcodone-acetaminophen (NORCO) 5-325 MG Tab per tablet 8/26/2020 Active   HYDROmorphone (DILAUDID) injection 1 mg  Active   lactated ringers infusion  Active   lidocaine (XYLOCAINE) 1 % injection 0.5 mL  Active   losartan (COZAAR) 100 MG Tab 8/25/2020 Active   metFORMIN (GLUCOPHAGE) 500 MG Tab 8/25/2020 Active   Multiple Vitamins-Minerals (CENTRUM SILVER PO) 8/24/2020 Active   nitroglycerin (NITROSTAT) 0.4 MG SL Tab 7/22/2020 Active   prasugrel (EFFIENT) 10 MG Tab 8/25/2020 Active              Current Facility-Administered Medications   Medication Dose Route Frequency Provider Last Rate Last Dose   • aspirin EC (ECOTRIN) tablet 81 mg  81 mg Oral DAILY Homer Gomez M.D.   81 mg at 08/27/20 0614   • atorvastatin (LIPITOR) tablet 40 mg  40 mg Oral QHS Homer Gomez M.D.   Stopped at 08/26/20 2130   • carvedilol (COREG) tablet 25 mg  25 mg Oral BID WITH MEALS Homer Gomez M.D.   25 mg at 08/27/20 0615   • HYDROcodone-acetaminophen (NORCO) 5-325 MG per tablet 1-2 Tab  1-2 Tab Oral PRN Homer Gomez M.D.       • losartan (COZAAR) tablet 100 mg  100 mg Oral DAILY Homer Gomez M.D.   100 mg at 08/27/20 0615   • nitroglycerin (NITROSTAT) tablet 0.4 mg  0.4 mg Sublingual PRN Homer Gomez M.D.       • prasugrel (EFFIENT) tablet 10 mg  10 mg Oral DAILY Homer Gomez M.D.       • diphenhydrAMINE (BENADRYL) injection 25 mg  25 mg Intravenous Q6HRS PRN Homer Gomez M.D.       • haloperidol lactate (HALDOL) injection 1 mg  1 mg Intravenous Q6HRS PRN Homer Gomez M.D.       • scopolamine (TRANSDERM-SCOP) patch 1 Patch  1  Patch Transdermal Q72HRS PRN Homer Gomez M.D.       • ketorolac (TORADOL) injection 15 mg  15 mg Intravenous Q6HRS Homer Gomez M.D.   15 mg at 08/27/20 0614   • ceFAZolin in dextrose (ANCEF) IVPB premix 1 g  1 g Intravenous Q8HRS Homer Gomez M.D.   Stopped at 08/27/20 0135   • acetaminophen (TYLENOL) suppository 650 mg  650 mg Rectal Q6HRS Homer Gomez M.D.       • senna-docusate (PERICOLACE or SENOKOT S) 8.6-50 MG per tablet 2 Tab  2 Tab Oral BID Jeremy M Gonda, M.D.        And   • polyethylene glycol/lytes (MIRALAX) PACKET 1 Packet  1 Packet Oral QDAY PRN Jeremy M Gonda, M.D.        And   • magnesium hydroxide (MILK OF MAGNESIA) suspension 30 mL  30 mL Oral QDAY PRN Jeremy M Gonda, M.D.        And   • bisacodyl (DULCOLAX) suppository 10 mg  10 mg Rectal QDAY PRN Jeremy M Gonda, M.D.       • NS infusion   Intravenous Continuous Jeremy M Gonda, M.D. 75 mL/hr at 08/26/20 2204     • enoxaparin (LOVENOX) inj 40 mg  40 mg Subcutaneous DAILY Jeremy M Gonda, M.D.   40 mg at 08/27/20 0613   • acetaminophen (TYLENOL) tablet 650 mg  650 mg Oral Q6HRS PRN Jeremy M Gonda, M.D.       • Pharmacy Consult Request ...Pain Management Review 1 Each  1 Each Other PHARMACY TO DOSE Jeremy M Gonda, M.D.        And   • oxyCODONE immediate-release (ROXICODONE) tablet 5 mg  5 mg Oral Q3HRS PRN Jeremy M Gonda, M.D.   5 mg at 08/27/20 0500    And   • oxyCODONE immediate-release (ROXICODONE) tablet 10 mg  10 mg Oral Q3HRS PRN Jeremy M Gonda, M.D.        And   • morphine (pf) 4 MG/ML injection 4 mg  4 mg Intravenous Q3HRS PRN Yg M Gonda, M.D.       • cloNIDine (CATAPRES) tablet 0.1 mg  0.1 mg Oral Q6HRS PRN Jeremy M Gonda, M.D.       • enalaprilat (VASOTEC) injection 1.25 mg  1.25 mg Intravenous Q6HRS PRN Jeremy M Gonda, M.D.       • labetalol (NORMODYNE/TRANDATE) injection 10-20 mg  10-20 mg Intravenous Q4HRS PRN Jeremy M Gonda, M.D.       • ondansetron (ZOFRAN) syringe/vial injection 4 mg  4 mg Intravenous Q4HRS PRN Jeremy M Gonda,  M.D.   4 mg at 08/27/20 0825   • ondansetron (ZOFRAN ODT) dispertab 4 mg  4 mg Oral Q4HRS PRN Jeremy M Gonda, M.D.       • promethazine (PHENERGAN) tablet 12.5-25 mg  12.5-25 mg Oral Q4HRS PRN Jeremy M Gonda, M.D.       • promethazine (PHENERGAN) suppository 12.5-25 mg  12.5-25 mg Rectal Q4HRS PRN Jeremy M Gonda, M.D.       • prochlorperazine (COMPAZINE) injection 5-10 mg  5-10 mg Intravenous Q4HRS PRN Jeremy M Gonda, M.D.       • insulin regular (HumuLIN R,NovoLIN R) injection  1-6 Units Subcutaneous 4X/DAY ACHS Jeremy M Gonda, M.D.   1 Units at 08/27/20 0838    And   • glucose 4 g chewable tablet 16 g  16 g Oral Q15 MIN PRN Jeremy M Gonda, M.D.        And   • dextrose 50% (D50W) injection 50 mL  50 mL Intravenous Q15 MIN PRN Jeremy M Gonda, M.D.       • nitroglycerin 50 mg in D5W 250 ml infusion  0-200 mcg/min Intravenous Continuous Jeremy M Gonda, M.D.   Stopped at 08/27/20 0820       Allergies  Allergies   Allergen Reactions   • Levofloxacin Unspecified     GI Bleeding     • Other Drug      Oral talwin       Vital Signs last 24 hours  Temp:  [36.1 °C (96.9 °F)-37 °C (98.6 °F)] 36.7 °C (98.1 °F)  Pulse:  [56-88] 78  Resp:  [3-46] 28  BP: (110-218)/() 161/73  SpO2:  [89 %-99 %] 91 %    Physical Exam  Physical Exam  Vitals signs and nursing note reviewed.   Constitutional:       Appearance: He is normal weight. He is ill-appearing. He is not toxic-appearing.   HENT:      Head: Normocephalic and atraumatic.      Right Ear: External ear normal.      Left Ear: External ear normal.      Nose: Nose normal. No congestion.      Mouth/Throat:      Mouth: Mucous membranes are moist.      Pharynx: Oropharynx is clear.      Comments: Oxygen facemask in place  Eyes:      General: No scleral icterus.     Extraocular Movements: Extraocular movements intact.      Conjunctiva/sclera: Conjunctivae normal.      Pupils: Pupils are equal, round, and reactive to light.   Neck:      Musculoskeletal: Neck supple. No neck rigidity.    Cardiovascular:      Rate and Rhythm: Regular rhythm. Bradycardia present.  No extrasystoles are present.     Chest Wall: PMI is displaced.      Pulses: Normal pulses.      Heart sounds: Normal heart sounds. No murmur.      Comments: Extremely hypertensive  Pulmonary:      Effort: Pulmonary effort is normal. No respiratory distress.      Breath sounds: Normal breath sounds. No wheezing or rhonchi.   Abdominal:      General: Bowel sounds are normal. There is no distension.      Palpations: Abdomen is soft.      Tenderness: There is no abdominal tenderness. There is no guarding.   Genitourinary:     Comments: Improving penile priapism with bandage overlying tip of penis  Musculoskeletal:         General: No tenderness.      Right lower leg: No edema.      Left lower leg: No edema.   Skin:     General: Skin is warm and dry.      Capillary Refill: Capillary refill takes less than 2 seconds.      Findings: No rash.   Neurological:      General: No focal deficit present.      Mental Status: He is alert and oriented to person, place, and time.      Cranial Nerves: No cranial nerve deficit.      Sensory: No sensory deficit.      Motor: No weakness.   Psychiatric:         Mood and Affect: Mood normal.         Behavior: Behavior normal.         Thought Content: Thought content normal.         Fluids    Intake/Output Summary (Last 24 hours) at 8/27/2020 0958  Last data filed at 8/27/2020 0800  Gross per 24 hour   Intake 1680.12 ml   Output 525 ml   Net 1155.12 ml       Laboratory  Recent Results (from the past 48 hour(s))   CBC WITH DIFFERENTIAL    Collection Time: 08/25/20 12:06 PM   Result Value Ref Range    WBC 9.6 4.8 - 10.8 K/uL    RBC 4.19 (L) 4.70 - 6.10 M/uL    Hemoglobin 12.9 (L) 14.0 - 18.0 g/dL    Hematocrit 41.3 (L) 42.0 - 52.0 %    MCV 98.6 (H) 81.4 - 97.8 fL    MCH 30.8 27.0 - 33.0 pg    MCHC 31.2 (L) 33.7 - 35.3 g/dL    RDW 43.8 35.9 - 50.0 fL    Platelet Count 288 164 - 446 K/uL    MPV 11.1 9.0 - 12.9 fL     Neutrophils-Polys 65.90 44.00 - 72.00 %    Lymphocytes 24.90 22.00 - 41.00 %    Monocytes 7.80 0.00 - 13.40 %    Eosinophils 0.70 0.00 - 6.90 %    Basophils 0.50 0.00 - 1.80 %    Immature Granulocytes 0.20 0.00 - 0.90 %    Nucleated RBC 0.00 /100 WBC    Neutrophils (Absolute) 6.32 1.82 - 7.42 K/uL    Lymphs (Absolute) 2.39 1.00 - 4.80 K/uL    Monos (Absolute) 0.75 0.00 - 0.85 K/uL    Eos (Absolute) 0.07 0.00 - 0.51 K/uL    Baso (Absolute) 0.05 0.00 - 0.12 K/uL    Immature Granulocytes (abs) 0.02 0.00 - 0.11 K/uL    NRBC (Absolute) 0.00 K/uL   Basic Metabolic Panel    Collection Time: 08/25/20 12:06 PM   Result Value Ref Range    Sodium 140 135 - 145 mmol/L    Potassium 4.8 3.6 - 5.5 mmol/L    Chloride 102 96 - 112 mmol/L    Co2 25 20 - 33 mmol/L    Glucose 101 (H) 65 - 99 mg/dL    Bun 23 (H) 8 - 22 mg/dL    Creatinine 1.67 (H) 0.50 - 1.40 mg/dL    Calcium 10.0 8.5 - 10.5 mg/dL    Anion Gap 13.0 7.0 - 16.0   Prothrombin Time    Collection Time: 08/25/20 12:06 PM   Result Value Ref Range    PT 13.8 12.0 - 14.6 sec    INR 1.03 0.87 - 1.13   APTT    Collection Time: 08/25/20 12:06 PM   Result Value Ref Range    APTT 37.4 (H) 24.7 - 36.0 sec   ESTIMATED GFR    Collection Time: 08/25/20 12:06 PM   Result Value Ref Range    GFR If  51 (A) >60 mL/min/1.73 m 2    GFR If Non  42 (A) >60 mL/min/1.73 m 2   COVID/SARS CoV-2 PCR    Collection Time: 08/26/20  2:43 PM    Specimen: Nasopharyngeal; Respirate   Result Value Ref Range    COVID Order Status Received    SARS-COV Antigen INA    Collection Time: 08/26/20  2:43 PM   Result Value Ref Range    SARS-CoV-2 Source Nasal Swab     SARS-COV ANTIGEN INA Not-Detected Not-Detected   ACCU-CHEK GLUCOSE    Collection Time: 08/26/20  3:31 PM   Result Value Ref Range    Glucose - Accu-Ck 117 (H) 65 - 99 mg/dL   EKG    Collection Time: 08/26/20  9:23 PM   Result Value Ref Range    Report       [facility]    Test Date:  2020-08-26  Pt Name:    DEANNA  [facility]               Department: [facility]  MRN:        [id number]                      Room:       S120  Gender:     Male                         Technician: MARYANN  :        1960                   Requested By:JEREMY M GONDA  Order #:    [id number]                    Reading MD: Elkin Hilliard MD    Measurements  Intervals                                Axis  Rate:       66                           P:          -16  ME:         162                          QRS:        -63  QRSD:       103                          T:          93  QT:         485  QTc:        509    Interpretive Statements  SINUS RHYTHM  ATRIAL PREMATURE COMPLEX  lafb  PROLONGED QT INTERVAL  Compared to ECG 2020 19:12:20  Atrial premature complex(es) now present  Electronically Signed On 2020 6:48:00 PDT by Elkin Hilliard MD     Comprehensive Metabolic Panel    Collection Time: 20 12:25 AM   Result Value Ref Range    Sodium 132 (L) 135 - 145 mmol/L    Potassium 4.2 3.6 - 5.5 mmol/L    Chloride 98 96 - 112 mmol/L    Co2 20 20 - 33 mmol/L    Anion Gap 14.0 7.0 - 16.0    Glucose 125 (H) 65 - 99 mg/dL    Bun 23 (H) 8 - 22 mg/dL    Creatinine 1.38 0.50 - 1.40 mg/dL    Calcium 9.1 8.5 - 10.5 mg/dL    AST(SGOT) 15 12 - 45 U/L    ALT(SGPT) 15 2 - 50 U/L    Alkaline Phosphatase 78 30 - 99 U/L    Total Bilirubin 0.6 0.1 - 1.5 mg/dL    Albumin 4.0 3.2 - 4.9 g/dL    Total Protein 6.9 6.0 - 8.2 g/dL    Globulin 2.9 1.9 - 3.5 g/dL    A-G Ratio 1.4 g/dL   TROPONIN    Collection Time: 20 12:25 AM   Result Value Ref Range    Troponin T 19 6 - 19 ng/L   ESTIMATED GFR    Collection Time: 20 12:25 AM   Result Value Ref Range    GFR If African American >60 >60 mL/min/1.73 m 2    GFR If Non African American 52 (A) >60 mL/min/1.73 m 2   CBC with Differential    Collection Time: 20  6:15 AM   Result Value Ref Range    WBC 11.4 (H) 4.8 - 10.8 K/uL    RBC 3.83 (L) 4.70 - 6.10 M/uL    Hemoglobin 11.7 (L) 14.0 - 18.0 g/dL    Hematocrit 35.1 (L)  42.0 - 52.0 %    MCV 91.6 81.4 - 97.8 fL    MCH 30.5 27.0 - 33.0 pg    MCHC 33.3 (L) 33.7 - 35.3 g/dL    RDW 39.8 35.9 - 50.0 fL    Platelet Count 273 164 - 446 K/uL    MPV 10.9 9.0 - 12.9 fL    Neutrophils-Polys 84.60 (H) 44.00 - 72.00 %    Lymphocytes 9.60 (L) 22.00 - 41.00 %    Monocytes 5.00 0.00 - 13.40 %    Eosinophils 0.20 0.00 - 6.90 %    Basophils 0.20 0.00 - 1.80 %    Immature Granulocytes 0.40 0.00 - 0.90 %    Nucleated RBC 0.00 /100 WBC    Neutrophils (Absolute) 9.65 (H) 1.82 - 7.42 K/uL    Lymphs (Absolute) 1.10 1.00 - 4.80 K/uL    Monos (Absolute) 0.57 0.00 - 0.85 K/uL    Eos (Absolute) 0.02 0.00 - 0.51 K/uL    Baso (Absolute) 0.02 0.00 - 0.12 K/uL    Immature Granulocytes (abs) 0.05 0.00 - 0.11 K/uL    NRBC (Absolute) 0.00 K/uL   Comp Metabolic Panel (CMP)    Collection Time: 08/27/20  6:15 AM   Result Value Ref Range    Sodium 134 (L) 135 - 145 mmol/L    Potassium 4.1 3.6 - 5.5 mmol/L    Chloride 99 96 - 112 mmol/L    Co2 20 20 - 33 mmol/L    Anion Gap 15.0 7.0 - 16.0    Glucose 122 (H) 65 - 99 mg/dL    Bun 25 (H) 8 - 22 mg/dL    Creatinine 1.65 (H) 0.50 - 1.40 mg/dL    Calcium 9.0 8.5 - 10.5 mg/dL    AST(SGOT) 12 12 - 45 U/L    ALT(SGPT) 14 2 - 50 U/L    Alkaline Phosphatase 74 30 - 99 U/L    Total Bilirubin 0.7 0.1 - 1.5 mg/dL    Albumin 3.7 3.2 - 4.9 g/dL    Total Protein 6.8 6.0 - 8.2 g/dL    Globulin 3.1 1.9 - 3.5 g/dL    A-G Ratio 1.2 g/dL   Magnesium    Collection Time: 08/27/20  6:15 AM   Result Value Ref Range    Magnesium 1.6 1.5 - 2.5 mg/dL   ESTIMATED GFR    Collection Time: 08/27/20  6:15 AM   Result Value Ref Range    GFR If  51 (A) >60 mL/min/1.73 m 2    GFR If Non  42 (A) >60 mL/min/1.73 m 2   ACCU-CHEK GLUCOSE    Collection Time: 08/27/20  6:25 AM   Result Value Ref Range    Glucose - Accu-Ck 109 (H) 65 - 99 mg/dL   Histology Request    Collection Time: 08/27/20  8:13 AM   Result Value Ref Range    Pathology Request Sent to Histo    ACCU-CHEK GLUCOSE     Collection Time: 08/27/20  8:30 AM   Result Value Ref Range    Glucose - Accu-Ck 161 (H) 65 - 99 mg/dL       Imaging  DX-CHEST-LIMITED (1 VIEW)   Final Result         1.  No acute cardiopulmonary disease.   2.  Cardiomegaly         Assessment/Plan  * Hypertensive urgency  Assessment & Plan  Postoperatively with associated nausea  Endorgan dysfunction as evidenced by CRISELDA   Resume Coreg and losartan  Titrate blood pressure using nitro    Priapism, unspecified- (present on admission)  Assessment & Plan  With necrosis of the penile glans status post debridement and drainage of bilateral corporal bodies 8/26  Monitor surgical site for bleeding  Infection prevention  PRN analgesics  Urology following    Type 2 diabetes mellitus with stage 2 chronic kidney disease, without long-term current use of insulin (HCC)  Assessment & Plan  Resume metformin  Insulin sliding scale  Diabetic diet monitoring glucose    Coronary artery disease involving native coronary artery with unstable angina pectoris (HCC)- (present on admission)  Assessment & Plan  Continue aspirin, Effient, beta-blocker, statin    Ascending aorta dilatation (HCC)- (present on admission)  Assessment & Plan  Without clinical symptoms of worsening  Tight blood pressure control    Ischemic cardiomyopathy- (present on admission)  Assessment & Plan  Without acute decompensation  Continue beta-blocker, ARB      Discussed patient condition and risk of morbidity and/or mortality with RN, Charge nurse / hot rounds, Patient and Anesthesiology.    The patient remains critically ill.  Critical care time = 37 minutes in directly providing and coordinating critical care and extensive data review.  No time overlap and excludes procedures.

## 2020-08-27 NOTE — ANESTHESIA POSTPROCEDURE EVALUATION
Patient: Conner Fox    Procedure Summary     Date: 08/26/20 Room / Location: [place] [id number] / SURGERY [facility]    Anesthesia Start: 1900 Anesthesia Stop: 2025    Procedure: CIRCUMCISION, ADULT - FOR DEBRIDEMENT OF PENILE GLANS (Penis) Diagnosis: (ischemic priaprism)    Surgeon: Homer Gomez M.D. Responsible Provider: Romaine Hernandez M.D.    Anesthesia Type: general ASA Status: 4          Final Anesthesia Type: general  Last vitals  BP   Blood Pressure: (!) 171/108, Arterial BP: (!) 199/114    Temp   36.4 °C (97.5 °F)    Pulse   Pulse: (!) 59   Resp   16    SpO2   97 %      Anesthesia Post Evaluation    Patient location during evaluation: PACU  Patient participation: complete - patient participated  Level of consciousness: awake and alert  Pain score: 0    Airway patency: patent  Anesthetic complications: no  Cardiovascular status: hypertensive (ongoing hypertensive crisis )  Respiratory status: acceptable  Hydration status: euvolemic  Comments: Pt with ongoing hypertensive crisis, managed with iv nitroglycerin, report given to intensivist in pacu , pt to be admitted to ICU for management    PONV: none           Nurse Pain Score: 0 (NPRS)

## 2020-08-27 NOTE — CONSULTS
Critical Care Consultation    Date of consult: 8/26/2020    Referring Physician  Homer Gomez M.D.    Reason for Consultation  Hypertensive urgency    History of Presenting Illness  60 y.o. male who presented 8/26/2020 with a past medical history significant for poorly controlled hypertension, diabetes and coronary artery disease who had undergone an aspiration and irrigation of priapism, iwacikny-mtbdexjgms-tqlbyram glandular shunt of the penis on 7/22 for persistent low flow ischemic priapism.  He was discharged home and followed up in clinic with Dr. Gomez where he had been experiencing significant pain and changes in his foreskin since the last visit.  He was scheduled for surgery today for debridement of penile glans under general anesthesia.  Patient did not take his antihypertensives this morning preoperatively and was found to be significantly hypertensive with a blood pressure of 220/140 preoperatively.  Anesthesia discussed concerns with urology but the surgery proceeded given its emergent nature to prevent loss of patient's penis.  Patient was given Coreg and Cozaar preoperatively.  Intraoperatively an arterial catheter was placed and patient had adequate blood pressure control using an esmolol drip and labetalol pushes.  Patient underwent an uncomplicated emergent debridement of penile glans with drainage of bilateral corporal bodies.  Postoperatively however patient's blood pressure increased again with a systolic greater than 200 despite adequate pain control.  He had nausea requiring Zofran but was denying any chest pain, shortness of breath, headache, vision changes.  Anesthesiology started the patient on a nitroglycerin drip for blood pressure control and I was consulted for ongoing critical care management and transfer to intensive care unit.  Patient reports having prior surgeries without significant difficulty in his blood pressure and states he normally has a blood pressure of 140/80 when he  measures it at home while taking his scheduled antihypertensives.  There was no report of flushing or diaphoresis during surgery.    Code Status  Full Code    Review of Systems  Review of Systems   Constitutional: Negative for chills, fever and malaise/fatigue.   HENT: Negative for congestion and nosebleeds.    Eyes: Negative for blurred vision, double vision and photophobia.   Respiratory: Negative for cough, sputum production, shortness of breath and wheezing.    Cardiovascular: Negative for chest pain, palpitations, orthopnea and leg swelling.   Gastrointestinal: Positive for nausea. Negative for abdominal pain, melena and vomiting.   Genitourinary: Negative for flank pain.   Musculoskeletal: Negative for back pain and neck pain.   Skin: Negative for rash.   Neurological: Negative for dizziness, sensory change, speech change, focal weakness and headaches.   Endo/Heme/Allergies: Does not bruise/bleed easily.   Psychiatric/Behavioral: Negative for depression and substance abuse. The patient is not nervous/anxious.    All other systems reviewed and are negative.      Past Medical History   has a past medical history of CAD (coronary artery disease), Cataract, Diabetes (HCC), Hypertension, Sleep apnea, and Snoring.    Surgical History   has a past surgical history that includes other cardiac surgery (01/10/2020); sphincter prosthesis placement (7/22/2020); and other (Bilateral). -Placement of a drug-eluting stent to the mid circumflex 1/10/2020    Family History  family history includes Cancer in his father, mother, paternal grandfather, and sister; Glaucoma in his mother.    Social History   reports that he has never smoked. He has never used smokeless tobacco. He reports previous alcohol use. He reports current drug use. Drug: Inhaled.    Medications  Home Medications     Reviewed by Juliana Segura R.N. (Registered Nurse) on 08/26/20 at 7563  Med List Status: Not Addressed   Medication Last Dose Status    acetaminophen (TYLENOL) tablet 1,000 mg  Active   aspirin EC (ECOTRIN) 81 MG Tablet Delayed Response 8/25/2020 Active   atorvastatin (LIPITOR) 40 MG Tab 8/25/2020 Active   carvedilol (COREG) 25 MG Tab 8/26/2020 Active   HYDROcodone-acetaminophen (NORCO) 5-325 MG Tab per tablet 8/26/2020 Active   HYDROmorphone (DILAUDID) injection 1 mg  Active   lactated ringers infusion  Active   lidocaine (XYLOCAINE) 1 % injection 0.5 mL  Active   losartan (COZAAR) 100 MG Tab 8/25/2020 Active   metFORMIN (GLUCOPHAGE) 500 MG Tab 8/25/2020 Active   Multiple Vitamins-Minerals (CENTRUM SILVER PO) 8/24/2020 Active   nitroglycerin (NITROSTAT) 0.4 MG SL Tab 7/22/2020 Active   prasugrel (EFFIENT) 10 MG Tab 8/25/2020 Active              Current Facility-Administered Medications   Medication Dose Route Frequency Provider Last Rate Last Dose   • lactated ringers infusion   Intravenous Continuous Homer Gomez M.D. 10 mL/hr at 08/26/20 1530       Facility-Administered Medications Ordered in Other Encounters   Medication Dose Route Frequency Provider Last Rate Last Dose   • propofol (DIPRIVAN) injection    PRN Romaine Hernandez M.D.   10 mg at 08/26/20 1934   • succinylcholine (ANECTINE) injection    PRN Romaine Hernandez M.D.   100 mg at 08/26/20 1934   • midazolam (VERSED) injection    PRN Romaine Hernandez M.D.   2 mg at 08/26/20 1900   • fentaNYL (SUBLIMAZE) injection    PRN Romaine Hernandez M.D.   50 mcg at 08/26/20 1900   • labetalol (NORMODYNE/TRANDATE) injection    PRN Romaine Hernandez M.D.   20 mg at 08/26/20 1923   • phenylephrine (TURNER-SYNEPHRINE) injection    PRN Romaine Hernandez M.D.   100 mcg at 08/26/20 1948       Allergies  Allergies   Allergen Reactions   • Levofloxacin Unspecified     GI Bleeding     • Other Drug      Oral talwin       Vital Signs last 24 hours  Temp:  [37 °C (98.6 °F)] 37 °C (98.6 °F)  Pulse:  [77-88] 83  Resp:  [3-25] 24  BP: (187-218)/(122-137) 190/128  SpO2:  [95 %-99 %] 98 %    Physical Exam  Physical Exam  Vitals signs and  nursing note reviewed.   Constitutional:       Appearance: He is normal weight. He is ill-appearing. He is not toxic-appearing.   HENT:      Head: Normocephalic and atraumatic.      Right Ear: External ear normal.      Left Ear: External ear normal.      Nose: Nose normal. No congestion.      Mouth/Throat:      Mouth: Mucous membranes are moist.      Pharynx: Oropharynx is clear.      Comments: Oxygen facemask in place  Eyes:      General: No scleral icterus.     Extraocular Movements: Extraocular movements intact.      Conjunctiva/sclera: Conjunctivae normal.      Pupils: Pupils are equal, round, and reactive to light.   Neck:      Musculoskeletal: Neck supple. No neck rigidity.   Cardiovascular:      Rate and Rhythm: Regular rhythm. Bradycardia present.  No extrasystoles are present.     Chest Wall: PMI is displaced.      Pulses: Normal pulses.      Heart sounds: Normal heart sounds. No murmur.      Comments: Extremely hypertensive  Pulmonary:      Effort: Pulmonary effort is normal. No respiratory distress.      Breath sounds: Normal breath sounds. No wheezing or rhonchi.   Abdominal:      General: Bowel sounds are normal. There is no distension.      Palpations: Abdomen is soft.      Tenderness: There is no abdominal tenderness. There is no guarding.   Genitourinary:     Comments: Improving penile priapism with bandage overlying tip of penis  Musculoskeletal:         General: No tenderness.      Right lower leg: No edema.      Left lower leg: No edema.   Skin:     General: Skin is warm and dry.      Capillary Refill: Capillary refill takes less than 2 seconds.      Findings: No rash.   Neurological:      General: No focal deficit present.      Mental Status: He is alert and oriented to person, place, and time.      Cranial Nerves: No cranial nerve deficit.      Sensory: No sensory deficit.      Motor: No weakness.   Psychiatric:         Mood and Affect: Mood normal.         Behavior: Behavior normal.          Thought Content: Thought content normal.         Fluids  No intake or output data in the 24 hours ending 08/26/20 2025    Laboratory  Recent Results (from the past 48 hour(s))   CBC WITH DIFFERENTIAL    Collection Time: 08/25/20 12:06 PM   Result Value Ref Range    WBC 9.6 4.8 - 10.8 K/uL    RBC 4.19 (L) 4.70 - 6.10 M/uL    Hemoglobin 12.9 (L) 14.0 - 18.0 g/dL    Hematocrit 41.3 (L) 42.0 - 52.0 %    MCV 98.6 (H) 81.4 - 97.8 fL    MCH 30.8 27.0 - 33.0 pg    MCHC 31.2 (L) 33.7 - 35.3 g/dL    RDW 43.8 35.9 - 50.0 fL    Platelet Count 288 164 - 446 K/uL    MPV 11.1 9.0 - 12.9 fL    Neutrophils-Polys 65.90 44.00 - 72.00 %    Lymphocytes 24.90 22.00 - 41.00 %    Monocytes 7.80 0.00 - 13.40 %    Eosinophils 0.70 0.00 - 6.90 %    Basophils 0.50 0.00 - 1.80 %    Immature Granulocytes 0.20 0.00 - 0.90 %    Nucleated RBC 0.00 /100 WBC    Neutrophils (Absolute) 6.32 1.82 - 7.42 K/uL    Lymphs (Absolute) 2.39 1.00 - 4.80 K/uL    Monos (Absolute) 0.75 0.00 - 0.85 K/uL    Eos (Absolute) 0.07 0.00 - 0.51 K/uL    Baso (Absolute) 0.05 0.00 - 0.12 K/uL    Immature Granulocytes (abs) 0.02 0.00 - 0.11 K/uL    NRBC (Absolute) 0.00 K/uL   Basic Metabolic Panel    Collection Time: 08/25/20 12:06 PM   Result Value Ref Range    Sodium 140 135 - 145 mmol/L    Potassium 4.8 3.6 - 5.5 mmol/L    Chloride 102 96 - 112 mmol/L    Co2 25 20 - 33 mmol/L    Glucose 101 (H) 65 - 99 mg/dL    Bun 23 (H) 8 - 22 mg/dL    Creatinine 1.67 (H) 0.50 - 1.40 mg/dL    Calcium 10.0 8.5 - 10.5 mg/dL    Anion Gap 13.0 7.0 - 16.0   Prothrombin Time    Collection Time: 08/25/20 12:06 PM   Result Value Ref Range    PT 13.8 12.0 - 14.6 sec    INR 1.03 0.87 - 1.13   APTT    Collection Time: 08/25/20 12:06 PM   Result Value Ref Range    APTT 37.4 (H) 24.7 - 36.0 sec   ESTIMATED GFR    Collection Time: 08/25/20 12:06 PM   Result Value Ref Range    GFR If  51 (A) >60 mL/min/1.73 m 2    GFR If Non  42 (A) >60 mL/min/1.73 m 2   COVID/SARS CoV-2  PCR    Collection Time: 20  2:43 PM    Specimen: Nasopharyngeal; Respirate   Result Value Ref Range    COVID Order Status Received    SARS-COV Antigen INA    Collection Time: 20  2:43 PM   Result Value Ref Range    SARS-CoV-2 Source Nasal Swab     SARS-COV ANTIGEN INA Not-Detected Not-Detected   ACCU-CHEK GLUCOSE    Collection Time: 20  3:31 PM   Result Value Ref Range    Glucose - Accu-Ck 117 (H) 65 - 99 mg/dL   EKG    Collection Time: 20  9:23 PM   Result Value Ref Range    Report       [facility]    Test Date:  2020  Pt Name:    DEANNA DUEÑAS               Department: [facility]  MRN:        [id number]                      Room:       [facility]  Gender:     Male                         Technician: MARYANN  :        1960                   Requested By:JEREMY M GONDA  Order #:    [id number]                    Reading MD:    Measurements  Intervals                                Axis  Rate:       66                           P:          -16  AK:         162                          QRS:        -63  QRSD:       103                          T:          93  QT:         485  QTc:        509    Interpretive Statements  SINUS RHYTHM  ATRIAL PREMATURE COMPLEX  LAD, CONSIDER LAFB OR INFERIOR INFARCT  ABNORMAL R-WAVE PROGRESSION, LATE TRANSITION  NONSPECIFIC T ABNORMALITIES, LATERAL LEADS  PROLONGED QT INTERVAL  Compared to ECG 2020 19:12:20  Atrial premature complex(es) now present  Myocardial infarct finding now present  T-wave abnormality now present         Imaging  DX-CHEST-LIMITED (1 VIEW)   Final Result         1.  No acute cardiopulmonary disease.   2.  Cardiomegaly       *Personally reviewed chest x-ray showing enlarged cardiac silhouette without acute cardiopulmonary process   *Personally reviewed EKG showing normal sinus rhythm with a rate of 66 with PAC, left axis deviation, nonspecific T wave abnormalities in the lateral leads, prolonged QTc interval of 509.  No significant  change since last EKG 7/22/20    Assessment/Plan  * Hypertensive urgency  Assessment & Plan  Postoperatively with associated nausea  Assess for endorgan dysfunction with tests including chest x-ray, EKG, troponin, BMP  Hypertension control with goal BP reduction by no more than 25% (goal SBP around 180, DBP around 110)  Continue to titrate nitroglycerin drip to this goal  Resume Coreg and losartan  IV PRN Vasotec, labetalol, hydralazine, oral clonidine  Evaluation for possible pheochromocytoma with 24-hour urinary VMA  Ensure adequate pain control    Priapism, unspecified- (present on admission)  Assessment & Plan  With necrosis of the penile glans status post debridement and drainage of bilateral corporal bodies 8/26  Monitor surgical site for bleeding  Infection prevention  PRN analgesics  Urology consulted    Type 2 diabetes mellitus with stage 2 chronic kidney disease, without long-term current use of insulin (HCC)  Assessment & Plan  Resume metformin  Insulin sliding scale  Diabetic diet monitoring glucose    Coronary artery disease involving native coronary artery with unstable angina pectoris (HCC)- (present on admission)  Assessment & Plan  Check a troponin and EKG for evidence of endorgan dysfunction  Continue aspirin, Effient, beta-blocker, statin    Ascending aorta dilatation (HCC)- (present on admission)  Assessment & Plan  Without clinical symptoms of worsening  Tight blood pressure control    Ischemic cardiomyopathy- (present on admission)  Assessment & Plan  Without acute decompensation  Continue beta-blocker, ARB      Discussed patient condition and risk of morbidity and/or mortality with RN, Charge nurse / hot rounds, Patient and Anesthesiology.    The patient remains critically ill.  Critical care time = 35 minutes in directly providing and coordinating critical care and extensive data review.  No time overlap and excludes procedures.

## 2020-08-27 NOTE — ASSESSMENT & PLAN NOTE
Postoperatively with associated nausea  Endorgan dysfunction as evidenced by CRISELDA   Resume Coreg and losartan  Titrate blood pressure using nitro

## 2020-08-27 NOTE — PROGRESS NOTES
Note to reader: this note follows the APSO format rather than the historical SOAP format. Assessment and plan located at the top of the note for ease of use.    Chief Complaint  60 y.o. male who presented 8/26/2020 with a past medical history significant for poorly controlled hypertension, diabetes and coronary artery disease who had undergone an aspiration and irrigation of priapism, mpjcmnrx-nyisyhsiay-lxgxldgz glandular shunt of the penis on 7/22 for persistent low flow ischemic priapism.  He was discharged home and followed up in clinic with Dr. Gomez where he had been experiencing significant pain and changes in his foreskin since the last visit.  He was scheduled for surgery 8/26 for debridement of penile glans under general anesthesia.     Assessment/Plan  Interval History   Active Hospital Problems    Diagnosis   • Priapism, unspecified [N48.30]     Priority: High     s/p repeat corporal aspiration and irrigation of priapism with saline and phenylephrine under general anesthesia and corpora-cavernosal, corpora-glandular shunt with snake procedure.      • Hypertensive urgency [I16.0]     Priority: High   • Type 2 diabetes mellitus with stage 2 chronic kidney disease, without long-term current use of insulin (HCC) [E11.22, N18.2]     Priority: Medium   • Coronary artery disease involving native coronary artery with unstable angina pectoris ([facility]) [I25.110]     Priority: Medium     80% mid, 100% distal LAD  8%mid, 70% ostial first diagonal     • Ischemic cardiomyopathy [I25.5]   • Ascending aorta dilatation ([facility]) [I77.810]      patient seen and examined.      8/27 POD 1 of debridement for left distal glandular eschar and glandular incisional eschar. Admitted for wound care and pain control. WBC 11.4. On Ancef. Pain well controlled. Changed his own dressings this AM, I asked he wait for me to arrive prior to next change. Plan to move off ICU tomorrow.     Disposition  Stable   PLAN:  Control pain   Continue to  monitor with  exams  Urology will continue to follow. Plan for close outpatient follow up.       Review of Systems  Physical Exam   Review of Systems   Constitutional: Negative for chills and fever.   Gastrointestinal: Negative for abdominal pain, nausea and vomiting.   Genitourinary: Negative for dysuria, frequency, hematuria and urgency.     Vitals:    08/27/20 0530 08/27/20 0600 08/27/20 0630 08/27/20 0700   BP: 131/76 120/72 155/75 (!) 161/73   Pulse: 70 72 79 78   Resp: 15 12 (!) 28 (!) 28   Temp:       TempSrc:       SpO2: 89% 95% 91%    Weight:       Height:         Physical Exam   Constitutional: He is oriented to person, place, and time and well-developed, well-nourished, and in no distress.   HENT:   Head: Normocephalic and atraumatic.   Eyes: Pupils are equal, round, and reactive to light.   Neck: Neck supple.   Pulmonary/Chest: Effort normal.   Abdominal: Soft.   Genitourinary:    Genitourinary Comments: Penis with dressings atop glans/head of penis, swollen. Minimal bloody discharge. TTP.      Neurological: He is alert and oriented to person, place, and time.   Skin: Skin is warm.        Hematology Chemistry   Lab Results   Component Value Date/Time    WBC 11.4 (H) 08/27/2020 06:15 AM    HEMOGLOBIN 11.7 (L) 08/27/2020 06:15 AM    HEMATOCRIT 35.1 (L) 08/27/2020 06:15 AM    PLATELETCT 273 08/27/2020 06:15 AM     Lab Results   Component Value Date/Time    SODIUM 134 (L) 08/27/2020 06:15 AM    POTASSIUM 4.1 08/27/2020 06:15 AM    CHLORIDE 99 08/27/2020 06:15 AM    CO2 20 08/27/2020 06:15 AM    GLUCOSE 122 (H) 08/27/2020 06:15 AM    BUN 25 (H) 08/27/2020 06:15 AM    CREATININE 1.65 (H) 08/27/2020 06:15 AM         Labs not explicitly included in this progress note were reviewed by the author.   Radiology/imaging not explicitly included in this progress note was reviewed by the author.     Core Measures

## 2020-08-27 NOTE — OR NURSING
2023 Pt from OR, arousable, with O2 support of 5 L/min via mask, respirations regular and spontaneous, high blood pressure noted, anesthesia at bedside, otherwise other vital sings within normal limits. Penis wrapped with dressing, CDI. SCDs ON, gurney set to lowest point and secured.    2030 NTG drip started Re: SBP>200, see MAR.    2035 Dr. Gomez at bedside.    2045 Dr. Gonda (ICU intensivist) at bedside.    2120 Chest x-ray done at bedside by Riki.    2125 EKG done at bedside by Galo.    2128 Gave report to Lavelle LISA.    2137 Pt complained some acid reflux, zofran given, see MAR.    2150 Pt comfortable to be transferred, vital signs stable, pt to room with RN, bedside monitor and O2 support of 2 L/min via nasal cannula. Emesis bag at bedside if needed. Pt belongings at the [facility].

## 2020-08-27 NOTE — PROGRESS NOTES
Patient arrived to [facility] at 2153 accompanied by PACU RN. Patient on transport monitor. Patient transferred to ICU bed without incident. ICU monitor placed.      2 RN skin assessment completed.   Post op surgical site to penis wrapped with gauze- CDI  Standard skin precautions in place  No other areas of skin breakdown noted.     Patient belongings include:  Shirt, shorts, sun glasses & cell phone. Pt declines safe keeping for personal belongings & requesting them to be left at the bedside.

## 2020-08-27 NOTE — OR SURGEON
Immediate Post OP Note    PreOp Diagnosis: History of Priapism                                Necrotic left glandular tissue and at base of glandular incision    PostOp Diagnosis: As above    Procedure(s):  EMERGENT DEBRIDEMENT OF PENILE GLANS WITH DRAINAGE OF BILATERAL CORPORAL BODIES - Wound Class: Dirty or Infected    Surgeon(s):  Homer Gomez M.D.    Anesthesiologist/Type of Anesthesia:  Anesthesiologist: Romaine Hernandez M.D./General ETT    Surgical Staff:  Circulator: Richelle Crandall R.N.  Scrub Person: Carlee Mims    Specimens removed if any:  ID Type Source Tests Collected by Time Destination   A : necrotic glandular skin Other Other PATHOLOGY SPECIMEN Homer Gomez M.D. 8/26/2020 1937        Estimated Blood Loss:25ml    Findings: Necrotic tissue on the left distal glans and a the base of the glandular incision.                  Old clot removed from corporal bodies     Complications: None        8/26/2020 7:58 PM Homer Gomez M.D.

## 2020-08-27 NOTE — ANESTHESIA PROCEDURE NOTES
Airway    Date/Time: 8/26/2020 7:34 PM  Performed by: Romaine Hernandez M.D.  Authorized by: Romaine Hernandez M.D.     Location:  OR  Urgency:  Elective  Difficult Airway: No    Indications for Airway Management:  Anesthesia      Spontaneous Ventilation: absent    Sedation Level:  Deep  Preoxygenated: Yes    Patient Position:  Sniffing  Mask Difficulty Assessment:  0 - not attempted  Final Airway Type:  Endotracheal airway  Final Endotracheal Airway:  ETT  Cuffed: Yes    Technique Used for Successful ETT Placement:  Direct laryngoscopy    Insertion Site:  Oral  Blade Type:  Jara  Laryngoscope Blade/Videolaryngoscope Blade Size:  2  ETT Size (mm):  8.0  Measured from:  Teeth  ETT to Teeth (cm):  23  Placement Verified by: auscultation and capnometry    Cormack-Lehane Classification:  Grade I - full view of glottis  Number of Attempts at Approach:  1

## 2020-08-27 NOTE — PROGRESS NOTES
Triage officer:  Dr. Jara, critical care, has evaluated Mr. Fox this morning and considers him stable for transfer out of the ICU. He is status post aspiration and irrigation for priapism by Dr. Gomez and was found to have hypertensive urgency with /140 requiring an IV nitro drip.  Dr. Zuniga with consult from the hospitalist service.

## 2020-08-27 NOTE — CARE PLAN
Problem: Fluid Volume:  Goal: Will maintain balanced intake and output  Outcome: PROGRESSING AS EXPECTED  Note: Patient with low U/O; bladder scan ordered to assess potential retention     Problem: Pain Management  Goal: Pain level will decrease to patient's comfort goal  Outcome: PROGRESSING AS EXPECTED  Note: Patient given PRN pain medication & scheduled medications- see MAR

## 2020-08-28 VITALS
TEMPERATURE: 97.5 F | OXYGEN SATURATION: 97 % | BODY MASS INDEX: 29.48 KG/M2 | WEIGHT: 205.91 LBS | RESPIRATION RATE: 18 BRPM | SYSTOLIC BLOOD PRESSURE: 138 MMHG | HEART RATE: 54 BPM | DIASTOLIC BLOOD PRESSURE: 99 MMHG | HEIGHT: 70 IN

## 2020-08-28 LAB
ALBUMIN SERPL BCP-MCNC: 4 G/DL (ref 3.2–4.9)
ALBUMIN/GLOB SERPL: 1.5 G/DL
ALP SERPL-CCNC: 72 U/L (ref 30–99)
ALT SERPL-CCNC: 11 U/L (ref 2–50)
AMPHET UR QL SCN: NEGATIVE
ANION GAP SERPL CALC-SCNC: 14 MMOL/L (ref 7–16)
AST SERPL-CCNC: 11 U/L (ref 12–45)
BACTERIA UR CULT: NORMAL
BARBITURATES UR QL SCN: NEGATIVE
BENZODIAZ UR QL SCN: POSITIVE
BILIRUB SERPL-MCNC: 0.4 MG/DL (ref 0.1–1.5)
BUN SERPL-MCNC: 36 MG/DL (ref 8–22)
BZE UR QL SCN: NEGATIVE
CALCIUM SERPL-MCNC: 9 MG/DL (ref 8.5–10.5)
CANNABINOIDS UR QL SCN: POSITIVE
CHLORIDE SERPL-SCNC: 98 MMOL/L (ref 96–112)
CO2 SERPL-SCNC: 22 MMOL/L (ref 20–33)
CREAT SERPL-MCNC: 1.72 MG/DL (ref 0.5–1.4)
ERYTHROCYTE [DISTWIDTH] IN BLOOD BY AUTOMATED COUNT: 41 FL (ref 35.9–50)
GLOBULIN SER CALC-MCNC: 2.7 G/DL (ref 1.9–3.5)
GLUCOSE BLD-MCNC: 127 MG/DL (ref 65–99)
GLUCOSE BLD-MCNC: 142 MG/DL (ref 65–99)
GLUCOSE SERPL-MCNC: 132 MG/DL (ref 65–99)
HCT VFR BLD AUTO: 35.8 % (ref 42–52)
HGB BLD-MCNC: 11.9 G/DL (ref 14–18)
MCH RBC QN AUTO: 31.1 PG (ref 27–33)
MCHC RBC AUTO-ENTMCNC: 33.2 G/DL (ref 33.7–35.3)
MCV RBC AUTO: 93.5 FL (ref 81.4–97.8)
METHADONE UR QL SCN: NEGATIVE
OPIATES UR QL SCN: POSITIVE
OXYCODONE UR QL SCN: POSITIVE
PCP UR QL SCN: NEGATIVE
PLATELET # BLD AUTO: 256 K/UL (ref 164–446)
PMV BLD AUTO: 10.9 FL (ref 9–12.9)
POTASSIUM SERPL-SCNC: 4.3 MMOL/L (ref 3.6–5.5)
PROPOXYPH UR QL SCN: NEGATIVE
PROT SERPL-MCNC: 6.7 G/DL (ref 6–8.2)
RBC # BLD AUTO: 3.83 M/UL (ref 4.7–6.1)
SIGNIFICANT IND 70042: NORMAL
SITE SITE: NORMAL
SODIUM SERPL-SCNC: 134 MMOL/L (ref 135–145)
SOURCE SOURCE: NORMAL
WBC # BLD AUTO: 12.5 K/UL (ref 4.8–10.8)

## 2020-08-28 PROCEDURE — 99239 HOSP IP/OBS DSCHRG MGMT >30: CPT | Performed by: HOSPITALIST

## 2020-08-28 PROCEDURE — A9270 NON-COVERED ITEM OR SERVICE: HCPCS | Performed by: HOSPITALIST

## 2020-08-28 PROCEDURE — A9270 NON-COVERED ITEM OR SERVICE: HCPCS | Performed by: NURSE PRACTITIONER

## 2020-08-28 PROCEDURE — 700102 HCHG RX REV CODE 250 W/ 637 OVERRIDE(OP): Performed by: HOSPITALIST

## 2020-08-28 PROCEDURE — 700102 HCHG RX REV CODE 250 W/ 637 OVERRIDE(OP): Performed by: NURSE PRACTITIONER

## 2020-08-28 PROCEDURE — A9270 NON-COVERED ITEM OR SERVICE: HCPCS | Performed by: INTERNAL MEDICINE

## 2020-08-28 PROCEDURE — A9270 NON-COVERED ITEM OR SERVICE: HCPCS | Performed by: UROLOGY

## 2020-08-28 PROCEDURE — 700111 HCHG RX REV CODE 636 W/ 250 OVERRIDE (IP): Performed by: INTERNAL MEDICINE

## 2020-08-28 PROCEDURE — 700111 HCHG RX REV CODE 636 W/ 250 OVERRIDE (IP): Performed by: UROLOGY

## 2020-08-28 PROCEDURE — 80307 DRUG TEST PRSMV CHEM ANLYZR: CPT

## 2020-08-28 PROCEDURE — 700102 HCHG RX REV CODE 250 W/ 637 OVERRIDE(OP): Performed by: INTERNAL MEDICINE

## 2020-08-28 PROCEDURE — 82962 GLUCOSE BLOOD TEST: CPT | Mod: 91

## 2020-08-28 PROCEDURE — 85027 COMPLETE CBC AUTOMATED: CPT

## 2020-08-28 PROCEDURE — 80053 COMPREHEN METABOLIC PANEL: CPT

## 2020-08-28 PROCEDURE — 700102 HCHG RX REV CODE 250 W/ 637 OVERRIDE(OP): Performed by: UROLOGY

## 2020-08-28 PROCEDURE — 36415 COLL VENOUS BLD VENIPUNCTURE: CPT

## 2020-08-28 RX ORDER — AMLODIPINE BESYLATE 5 MG/1
5 TABLET ORAL DAILY
Qty: 30 TAB | Refills: 0 | Status: SHIPPED | OUTPATIENT
Start: 2020-08-29 | End: 2020-10-06

## 2020-08-28 RX ORDER — OXYCODONE HYDROCHLORIDE AND ACETAMINOPHEN 5; 325 MG/1; MG/1
1 TABLET ORAL EVERY 4 HOURS PRN
Qty: 24 TAB | Refills: 0 | Status: SHIPPED | OUTPATIENT
Start: 2020-08-28 | End: 2020-09-09

## 2020-08-28 RX ORDER — HYDROCODONE BITARTRATE AND ACETAMINOPHEN 5; 325 MG/1; MG/1
1-2 TABLET ORAL EVERY 6 HOURS PRN
Status: DISCONTINUED | OUTPATIENT
Start: 2020-08-28 | End: 2020-08-28 | Stop reason: HOSPADM

## 2020-08-28 RX ADMIN — KETOROLAC TROMETHAMINE 15 MG: 30 INJECTION, SOLUTION INTRAMUSCULAR at 00:04

## 2020-08-28 RX ADMIN — CEFAZOLIN SODIUM 1 G: 1 INJECTION, SOLUTION INTRAVENOUS at 14:37

## 2020-08-28 RX ADMIN — ACETAMINOPHEN 650 MG: 325 TABLET, FILM COATED ORAL at 11:24

## 2020-08-28 RX ADMIN — PRASUGREL 10 MG: 10 TABLET, FILM COATED ORAL at 05:57

## 2020-08-28 RX ADMIN — CLONIDINE HYDROCHLORIDE 0.2 MG: 0.1 TABLET ORAL at 16:20

## 2020-08-28 RX ADMIN — KETOROLAC TROMETHAMINE 15 MG: 30 INJECTION, SOLUTION INTRAMUSCULAR at 05:58

## 2020-08-28 RX ADMIN — CARVEDILOL 25 MG: 25 TABLET, FILM COATED ORAL at 16:14

## 2020-08-28 RX ADMIN — HYDROCODONE BITARTRATE AND ACETAMINOPHEN 2 TABLET: 5; 325 TABLET ORAL at 09:39

## 2020-08-28 RX ADMIN — ACETAMINOPHEN 650 MG: 650 SUPPOSITORY RECTAL at 12:00

## 2020-08-28 RX ADMIN — LOSARTAN POTASSIUM 100 MG: 50 TABLET, FILM COATED ORAL at 05:58

## 2020-08-28 RX ADMIN — CARVEDILOL 25 MG: 25 TABLET, FILM COATED ORAL at 06:05

## 2020-08-28 RX ADMIN — ASPIRIN 81 MG: 81 TABLET, COATED ORAL at 05:58

## 2020-08-28 RX ADMIN — HYDROCODONE BITARTRATE AND ACETAMINOPHEN 2 TABLET: 5; 325 TABLET ORAL at 16:14

## 2020-08-28 RX ADMIN — HYDROCODONE BITARTRATE AND ACETAMINOPHEN 1 TABLET: 5; 325 TABLET ORAL at 03:46

## 2020-08-28 RX ADMIN — ENOXAPARIN SODIUM 40 MG: 40 INJECTION SUBCUTANEOUS at 05:57

## 2020-08-28 RX ADMIN — CEFAZOLIN SODIUM 1 G: 1 INJECTION, SOLUTION INTRAVENOUS at 06:16

## 2020-08-28 RX ADMIN — KETOROLAC TROMETHAMINE 15 MG: 30 INJECTION, SOLUTION INTRAMUSCULAR at 11:25

## 2020-08-28 ASSESSMENT — ENCOUNTER SYMPTOMS
VOMITING: 0
FEVER: 0
ABDOMINAL PAIN: 0
NAUSEA: 0
CHILLS: 0

## 2020-08-28 NOTE — PROGRESS NOTES
2 RN skin check with Shadia    Dressing to L wrist where art line previously was; CDI.  Penrose drain x2 to penis, entire penis is wrapped, dressing CDI.  Bruise to LLE  Healing laceration to RLE    All other skin intact. No signs of skin breakdown over bony prominences.    Devices in place: PIV

## 2020-08-28 NOTE — CARE PLAN
Problem: Communication  Goal: The ability to communicate needs accurately and effectively will improve  Outcome: PROGRESSING AS EXPECTED     Problem: Safety  Goal: Will remain free from injury  Outcome: PROGRESSING AS EXPECTED  Goal: Will remain free from falls  Outcome: PROGRESSING AS EXPECTED     Problem: Infection  Goal: Will remain free from infection  Outcome: PROGRESSING AS EXPECTED     Problem: Knowledge Deficit  Goal: Knowledge of the prescribed therapeutic regimen will improve  Outcome: PROGRESSING AS EXPECTED     Problem: Pain Management  Goal: Pain level will decrease to patient's comfort goal  Outcome: PROGRESSING AS EXPECTED

## 2020-08-28 NOTE — ASSESSMENT & PLAN NOTE
Unfortunately patient remains quite hypertensive despite continuation of his home dose medications, requiring a few as needed doses.  Will add amlodipine 8/27, reevaluate in the morning for additional agents as needed.

## 2020-08-28 NOTE — PROGRESS NOTES
Assessment complete.  A&O x 4. Patient calls appropriately.  Patient ambulates with no assistance needed. Bed alarm off.   Patient has 2/10 pain. Pain managed with prescribed medications.  Denies N&V. Tolerating diabetic cardiac diet.  Wound dressings CDI. Penrose drain x2 in place to penis, scant drainage. Pt stated he changes dressing independently PRN.  - void at this time, - flatus, - BM.  Patient denies SOB.  SCD's off.    Review plan with of care with patient. Call light and personal belongings with in reach. Hourly rounding in place. All needs met at this time.

## 2020-08-28 NOTE — PROGRESS NOTES
1915-- Received bedside report from SLOAN Luo. Patient BP has been labile throughout the day. Patient restarted on home medications. Current /108. Called [facility] charge to update on SBP.    1930-- Dr. Escobedo updated on patient status. Patient /90 at this time. Will give scheduled 25mg of Coreg and reassess readiness to transfer in 1 hour. Patient is calm, oriented, and updated on current plan of care.

## 2020-08-28 NOTE — PROGRESS NOTES
Note to reader: this note follows the APSO format rather than the historical SOAP format. Assessment and plan located at the top of the note for ease of use.    Chief Complaint  60 y.o. male who presented 8/26/2020 with a past medical history significant for poorly controlled hypertension, diabetes and coronary artery disease who had undergone an aspiration and irrigation of priapism, pliliyaq-dkwotykvvp-rmafnwxy glandular shunt of the penis on 7/22 for persistent low flow ischemic priapism.  He was discharged home and followed up in clinic with Dr. Gomez where he had been experiencing significant pain and changes in his foreskin since the last visit.  He was scheduled for surgery 8/26 for debridement of penile glans under general anesthesia.     Assessment/Plan  Interval History   Active Hospital Problems    Diagnosis   • Priapism, unspecified [N48.30]     Priority: High     s/p repeat corporal aspiration and irrigation of priapism with saline and phenylephrine under general anesthesia and corpora-cavernosal, corpora-glandular shunt with snake procedure.      • Hypertensive urgency [I16.0]     Priority: High   • Type 2 diabetes mellitus with stage 2 chronic kidney disease, without long-term current use of insulin (HCC) [E11.22, N18.2]     Priority: Medium   • Coronary artery disease involving native coronary artery with unstable angina pectoris ([facility]) [I25.110]     Priority: Medium     80% mid, 100% distal LAD  8%mid, 70% ostial first diagonal     • Ischemic cardiomyopathy [I25.5]   • Ascending aorta dilatation ([facility]) [I77.810]      patient seen and examined.    8/28. POD 2. WBC 12.5(11.4). Pain well controlled. Pt anxious for discharge.       8/27 POD 1 of debridement for left distal glandular eschar and glandular incisional eschar. Admitted for wound care and pain control. WBC 11.4. On Ancef. Pain well controlled. Changed his own dressings this AM, I asked he wait for me to arrive prior to next change. Plan to  move off ICU tomorrow.     Disposition  Stable   PLAN:  Control pain   Continue to monitor with  exams- exam much improved since time of surgery.  Urology signing off, follow up with Dr. Jason Bee- our office will arrange.      Review of Systems  Physical Exam   Review of Systems   Constitutional: Negative for chills and fever.   Gastrointestinal: Negative for abdominal pain, nausea and vomiting.   Genitourinary: Negative for dysuria, frequency, hematuria and urgency.     Vitals:    08/27/20 2135 08/27/20 2318 08/28/20 0325 08/28/20 0755   BP: 129/79 116/61 137/85 130/86   Pulse: 63 (!) 58 64 (!) 59   Resp: 18 18 18 18   Temp: 36.8 °C (98.3 °F) 36.7 °C (98.1 °F) 37 °C (98.6 °F) 37 °C (98.6 °F)   TempSrc: Temporal Temporal Temporal Temporal   SpO2: 93% 94% 99% 93%   Weight:       Height:         Physical Exam   Constitutional: He is oriented to person, place, and time and well-developed, well-nourished, and in no distress.   HENT:   Head: Normocephalic and atraumatic.   Eyes: Pupils are equal, round, and reactive to light.   Neck: Normal range of motion. Neck supple.   Pulmonary/Chest: Effort normal.   Abdominal: Soft.   Genitourinary:    Genitourinary Comments: Distal left glans with dime size area of eschar. Base of glans swollen. Stitches well approximated without excess bloody drainage. Drains in place.     Neurological: He is alert and oriented to person, place, and time.   Skin: Skin is warm.        Hematology Chemistry   Lab Results   Component Value Date/Time    WBC 12.5 (H) 08/28/2020 05:13 AM    HEMOGLOBIN 11.9 (L) 08/28/2020 05:13 AM    HEMATOCRIT 35.8 (L) 08/28/2020 05:13 AM    PLATELETCT 256 08/28/2020 05:13 AM     Lab Results   Component Value Date/Time    SODIUM 134 (L) 08/28/2020 05:13 AM    POTASSIUM 4.3 08/28/2020 05:13 AM    CHLORIDE 98 08/28/2020 05:13 AM    CO2 22 08/28/2020 05:13 AM    GLUCOSE 132 (H) 08/28/2020 05:13 AM    BUN 36 (H) 08/28/2020 05:13 AM    CREATININE 1.72 (H) 08/28/2020  05:13 AM         Labs not explicitly included in this progress note were reviewed by the author.   Radiology/imaging not explicitly included in this progress note was reviewed by the author.     Core Measures

## 2020-08-28 NOTE — PROGRESS NOTES
[facility] Medicine Daily Progress Note    Date of Service  8/27/2020    Chief Complaint  60 y.o. male admitted 8/26/2020 with priapism    Hospital Course    Patient is a 60-year-old gentleman with a history of significant atherosclerotic coronary artery disease, hypertension, and recent urological surgery to treat a prolonged priapism in July of this year.  Unfortunately he did suffer a postoperative complication of some necrosis, pain, and swelling of the glans of his penis.  He was evaluated by urology, where they did note some necrotic tissue and new swelling.  He was taken to the OR for emergent debridement of glandular necrotic tissue in the left glans and debridement of glandular incisional wound with placement of corporal cavernosal drains, on August 26, 2020.      Interval Problem Update  Procedure was well-tolerated, patient reports that he actually has significantly reduced pain from preoperatively.  He is very relieved as his initial surgery in July of this year was associated with some fairly significant postoperative discomfort.  While he was initially managed for hypertensive urgency in the ICU with a drip, this has been successfully weaned off and patient has been deemed stable for transfer out of ICU.    Consultants/Specialty  Urology  Pulmonary/ICU Medicine.     Code Status  Full Code    Disposition  Pending.  Likely to home 8/31 once BP better controlled and cleared by Urology.     Review of Systems  Review of Systems   Genitourinary:        Penile pain at the site of his surgery.  Well-controlled with medications as ordered.   All other systems reviewed and are negative.       Physical Exam  Temp:  [36.1 °C (96.9 °F)-36.9 °C (98.4 °F)] 36.7 °C (98 °F)  Pulse:  [56-88] 87  Resp:  [11-46] 18  BP: (110-196)/() 174/97  SpO2:  [89 %-98 %] 91 %    General: No acute distress  HEENT atraumatic, normocephalic, pupils equal round reactive to light  Chest: Respirations are unlabored  Cardiac:  Physiologic S1 and S2  Abdomen: Soft, nontender, nondistended  Extremities: Without clubbing, cyanosis or edema.  Arterial line noted.  Genitourinary: Bandages are clean dry and intact to the distal penis.  Penrose drains are noted.  Neuro: Cranial nerves II through XII are grossly intact.      Current Facility-Administered Medications:   •  aspirin EC (ECOTRIN) tablet 81 mg, 81 mg, Oral, DAILY, Homer Gomez M.D., 81 mg at 08/27/20 0614  •  atorvastatin (LIPITOR) tablet 40 mg, 40 mg, Oral, QHS, Homer Gomez M.D., Stopped at 08/26/20 2130  •  carvedilol (COREG) tablet 25 mg, 25 mg, Oral, BID WITH MEALS, Homer Gomez M.D., 25 mg at 08/27/20 0615  •  HYDROcodone-acetaminophen (NORCO) 5-325 MG per tablet 1-2 Tab, 1-2 Tab, Oral, PRN, Homer Gomez M.D., 1 Tab at 08/27/20 1652  •  losartan (COZAAR) tablet 100 mg, 100 mg, Oral, DAILY, Homer Gomez M.D., 100 mg at 08/27/20 0615  •  nitroglycerin (NITROSTAT) tablet 0.4 mg, 0.4 mg, Sublingual, PRN, Homer Gomez M.D.  •  prasugrel (EFFIENT) tablet 10 mg, 10 mg, Oral, DAILY, Homer Gomez M.D., 10 mg at 08/27/20 1635  •  diphenhydrAMINE (BENADRYL) injection 25 mg, 25 mg, Intravenous, Q6HRS PRN, Homer Gomez M.D.  •  haloperidol lactate (HALDOL) injection 1 mg, 1 mg, Intravenous, Q6HRS PRN, Homer Gomez M.D.  •  scopolamine (TRANSDERM-SCOP) patch 1 Patch, 1 Patch, Transdermal, Q72HRS PRN, Homer Gomez M.D.  •  ketorolac (TORADOL) injection 15 mg, 15 mg, Intravenous, Q6HRS, Homer Gomez M.D., 15 mg at 08/27/20 1633  •  ceFAZolin in dextrose (ANCEF) IVPB premix 1 g, 1 g, Intravenous, Q8HRS, Homer Gomez M.D., Stopped at 08/27/20 1621  •  acetaminophen (TYLENOL) suppository 650 mg, 650 mg, Rectal, Q6HRS, Homer Gomez M.D., Stopped at 08/27/20 1800  •  senna-docusate (PERICOLACE or SENOKOT S) 8.6-50 MG per tablet 2 Tab, 2 Tab, Oral, BID, Stopped at 08/27/20 1800 **AND** polyethylene glycol/lytes (MIRALAX) PACKET 1 Packet, 1 Packet, Oral, QDAY PRN **AND** magnesium  hydroxide (MILK OF MAGNESIA) suspension 30 mL, 30 mL, Oral, QDAY PRN **AND** bisacodyl (DULCOLAX) suppository 10 mg, 10 mg, Rectal, QDAY PRN, Jeremy M Gonda, M.D.  •  enoxaparin (LOVENOX) inj 40 mg, 40 mg, Subcutaneous, DAILY, Jeremy M Gonda, M.D., 40 mg at 08/27/20 0613  •  acetaminophen (TYLENOL) tablet 650 mg, 650 mg, Oral, Q6HRS PRN, Jeremy M Gonda, M.D., 650 mg at 08/27/20 1635  •  Notify provider if pain remains uncontrolled, , , CONTINUOUS **AND** Use the numeric rating scale (NRS-11) on regular floors and Critical-Care Pain Observation Tool (CPOT) on ICUs/Trauma to assess pain, , , CONTINUOUS **AND** Pulse Ox (Oximetry), , , CONTINUOUS **AND** Pharmacy Consult Request ...Pain Management Review 1 Each, 1 Each, Other, PHARMACY TO DOSE **AND** If patient difficult to arouse and/or has respiratory depression, stop any opiates that are currently infusing and call a Rapid Response., , , CONTINUOUS **AND** oxyCODONE immediate-release (ROXICODONE) tablet 5 mg, 5 mg, Oral, Q3HRS PRN, 5 mg at 08/27/20 0500 **AND** oxyCODONE immediate-release (ROXICODONE) tablet 10 mg, 10 mg, Oral, Q3HRS PRN **AND** morphine (pf) 4 MG/ML injection 4 mg, 4 mg, Intravenous, Q3HRS PRN, Jeremy M Gonda, M.D.  •  cloNIDine (CATAPRES) tablet 0.1 mg, 0.1 mg, Oral, Q6HRS PRN, Jeremy M Gonda, M.D.  •  enalaprilat (VASOTEC) injection 1.25 mg, 1.25 mg, Intravenous, Q6HRS PRN, Jeremy M Gonda, M.D., 1.25 mg at 08/27/20 1700  •  labetalol (NORMODYNE/TRANDATE) injection 10-20 mg, 10-20 mg, Intravenous, Q4HRS PRN, Jeremy M Gonda, M.D., 10 mg at 08/27/20 1827  •  ondansetron (ZOFRAN) syringe/vial injection 4 mg, 4 mg, Intravenous, Q4HRS PRN, Jeremy M Gonda, M.D., 4 mg at 08/27/20 1635  •  ondansetron (ZOFRAN ODT) dispertab 4 mg, 4 mg, Oral, Q4HRS PRN, Jeremy M Gonda, M.D.  •  promethazine (PHENERGAN) tablet 12.5-25 mg, 12.5-25 mg, Oral, Q4HRS PRN, Jeremy M Gonda, M.D.  •  promethazine (PHENERGAN) suppository 12.5-25 mg, 12.5-25 mg, Rectal, Q4HRS PRN,  Jeremy M Gonda, M.D.  •  prochlorperazine (COMPAZINE) injection 5-10 mg, 5-10 mg, Intravenous, Q4HRS PRN, Jeremy M Gonda, M.D.  •  insulin regular (HumuLIN R,NovoLIN R) injection, 1-6 Units, Subcutaneous, 4X/DAY ACHS, Stopped at 08/27/20 1100 **AND** POC Blood Glucose, , , Q AC AND BEDTIME(S) **AND** NOTIFY MD and PharmD, , , Once **AND** glucose 4 g chewable tablet 16 g, 16 g, Oral, Q15 MIN PRN **AND** dextrose 50% (D50W) injection 50 mL, 50 mL, Intravenous, Q15 MIN PRN, Jeremy M Gonda, M.D.      Fluids    Intake/Output Summary (Last 24 hours) at 8/27/2020 1824  Last data filed at 8/27/2020 0800  Gross per 24 hour   Intake 1680.12 ml   Output 525 ml   Net 1155.12 ml       Laboratory  Recent Labs     08/25/20  1206 08/27/20  0615   WBC 9.6 11.4*   RBC 4.19* 3.83*   HEMOGLOBIN 12.9* 11.7*   HEMATOCRIT 41.3* 35.1*   MCV 98.6* 91.6   MCH 30.8 30.5   MCHC 31.2* 33.3*   RDW 43.8 39.8   PLATELETCT 288 273   MPV 11.1 10.9     Recent Labs     08/25/20  1206 08/27/20  0025 08/27/20  0615   SODIUM 140 132* 134*   POTASSIUM 4.8 4.2 4.1   CHLORIDE 102 98 99   CO2 25 20 20   GLUCOSE 101* 125* 122*   BUN 23* 23* 25*   CREATININE 1.67* 1.38 1.65*   CALCIUM 10.0 9.1 9.0     Recent Labs     08/25/20  1206   APTT 37.4*   INR 1.03               Imaging  DX-CHEST-LIMITED (1 VIEW)   Final Result         1.  No acute cardiopulmonary disease.   2.  Cardiomegaly           Assessment/Plan  * Hypertensive urgency  Assessment & Plan  Unfortunately patient remains quite hypertensive despite continuation of his home dose medications, requiring a few as needed doses.  Will add amlodipine 8/27, reevaluate in the morning for additional agents as needed.    Priapism, unspecified- (present on admission)  Assessment & Plan  Postoperative care per the urology service.    Type 2 diabetes mellitus with stage 2 chronic kidney disease, without long-term current use of insulin (HCC)  Assessment & Plan  Blood sugars have remained within the goal range of   without the need for basal insulin, continue correctional as as ordered.    Coronary artery disease involving native coronary artery with unstable angina pectoris (HCC)- (present on admission)  Assessment & Plan  Continue home dose dual antiplatelet therapy.    Ischemic cardiomyopathy- (present on admission)  Assessment & Plan  Continue home dose beta-blockade.       VTE prophylaxis: SCDs

## 2020-08-28 NOTE — CARE PLAN
Problem: Communication  Goal: The ability to communicate needs accurately and effectively will improve  8/27/2020 1954 by Puja Curtis R.N.  Outcome: PROGRESSING AS EXPECTED  8/27/2020 1949 by Puja Curtis R.N.  Outcome: PROGRESSING AS EXPECTED     Problem: Safety  Goal: Will remain free from injury  8/27/2020 1954 by Puja Curtis R.N.  Outcome: PROGRESSING AS EXPECTED  8/27/2020 1949 by Puja Curtis R.N.  Outcome: PROGRESSING AS EXPECTED

## 2020-08-28 NOTE — PROGRESS NOTES
Pt A&O x's 4. VSS, medicated for 4/10 pain . Pt up self with steady gait, BA not indicated. Pt tolerating diet, denies any N/V/D, last BM PTA. Penrose drain x's 2, +serosangiunous output, pt doing self care with dressing changes. Pt is on RA, denies SOB or CP. Discharge orders placed, prescriptions given, all questions and concerns have been addressed, supplies given for dressing changes. Will continue to advance to discharge.

## 2020-08-28 NOTE — DISCHARGE INSTRUCTIONS
Discharge Instructions    Discharged to home by car with friend. Discharged via walking, hospital escort: Yes.  Special equipment needed: Not Applicable    Be sure to schedule a follow-up appointment with your primary care doctor or any specialists as instructed.     Discharge Plan:   Diet Plan: Discussed  Activity Level: Discussed  Confirmed Follow up Appointment: Patient to Call and Schedule Appointment  Confirmed Symptoms Management: Discussed  Medication Reconciliation Updated: Yes    I understand that a diet low in cholesterol, fat, and sodium is recommended for good health. Unless I have been given specific instructions below for another diet, I accept this instruction as my diet prescription.   Other diet: Regular as tolerated     Special Instructions: None    · Is patient discharged on Warfarin / Coumadin?   No     Depression / Suicide Risk    As you are discharged from this [facility] facility, it is important to learn how to keep safe from harming yourself.    Recognize the warning signs:  · Abrupt changes in personality, positive or negative- including increase in energy   · Giving away possessions  · Change in eating patterns- significant weight changes-  positive or negative  · Change in sleeping patterns- unable to sleep or sleeping all the time   · Unwillingness or inability to communicate  · Depression  · Unusual sadness, discouragement and loneliness  · Talk of wanting to die  · Neglect of personal appearance   · Rebelliousness- reckless behavior  · Withdrawal from people/activities they love  · Confusion- inability to concentrate     If you or a loved one observes any of these behaviors or has concerns about self-harm, here's what you can do:  · Talk about it- your feelings and reasons for harming yourself  · Remove any means that you might use to hurt yourself (examples: pills, rope, extension cords, firearm)  · Get professional help from the community (Mental Health, Substance Abuse,  psychological counseling)  · Do not be alone:Call your Safe Contact- someone whom you trust who will be there for you.  · Call your local CRISIS HOTLINE 266-8438 or 059-516-4452  · Call your local Children's Mobile Crisis Response Team [place] (333) 968-9490 or www.[employer]  · Call the toll free National Suicide Prevention Hotlines   · National Suicide Prevention Lifeline 410-655-FITP (9029)  · National Hope Line Network 800-SUICIDE (390-2249)

## 2020-08-28 NOTE — ASSESSMENT & PLAN NOTE
Blood sugars have remained within the goal range of  without the need for basal insulin, continue correctional as as ordered.

## 2020-08-28 NOTE — CARE PLAN
Problem: Communication  Goal: The ability to communicate needs accurately and effectively will improve  Outcome: PROGRESSING AS EXPECTED  Note: Updated on POC, pt able to communicate needs appropriately, and call light is within reach     Problem: Infection  Goal: Will remain free from infection  Outcome: PROGRESSING AS EXPECTED  Note: IV ABX in place

## 2020-08-29 NOTE — DISCHARGE SUMMARY
Discharge Summary    CHIEF COMPLAINT ON ADMISSION  No chief complaint on file.      Reason for Admission  ISCHEMIC PRIAPISM     Admission Date  8/26/2020    CODE STATUS  Full Code    HPI & HOSPITAL COURSE  Patient is a 60-year-old gentleman with a history of significant atherosclerotic coronary artery disease, hypertension, and recent urological surgery to treat a prolonged priapism in July of this year.  Unfortunately he did suffer a postoperative complication of some necrosis, pain, and swelling of the glans of his penis.  He was evaluated by urology, where they did note some necrotic tissue and new swelling.  He was taken to the OR for emergent debridement of glandular necrotic tissue in the left glans and debridement of glandular incisional wound with placement of corporal cavernosal drains, on August 26, 2020. Procedure was well-tolerated, there was no blood loss.  Patient was able to change his own dressings and manage pain with oral pain medications on postoperative day #1.  Urology signed off.  They did feel he was stable for outpatient follow-up with urology this coming Tuesday.    Therefore, he is discharged in good and stable condition to home with close outpatient follow-up.    The patient met 2-midnight criteria for an inpatient stay at the time of discharge.    Discharge Date  08/28/20      FOLLOW UP ITEMS POST DISCHARGE  Urology    DISCHARGE DIAGNOSES  Principal Problem:    Hypertensive urgency POA: Unknown  Active Problems:    Priapism, unspecified POA: Yes      Overview: s/p repeat corporal aspiration and irrigation of priapism with saline and       phenylephrine under general anesthesia and corpora-cavernosal,       corpora-glandular shunt with snake procedure.     Coronary artery disease involving native coronary artery with unstable angina pectoris (HCC) POA: Yes      Overview: 80% mid, 100% distal LAD      8%mid, 70% ostial first diagonal    Type 2 diabetes mellitus with stage 2 chronic kidney  disease, without long-term current use of insulin (HCC) POA: Unknown    Ischemic cardiomyopathy POA: Yes    Ascending aorta dilatation (HCC) POA: Yes  Resolved Problems:    * No resolved hospital problems. *      FOLLOW UP  Future Appointments   Date Time Provider Department Center   10/2/2020  9:20 AM Wild Don M.D. [facility]     Homer Gomez M.D.  [address]  [place] [address]  [phone]    In 4 days        MEDICATIONS ON DISCHARGE     Medication List      START taking these medications      Instructions   amLODIPine 5 MG Tabs  Start taking on: August 29, 2020  Commonly known as: NORVASC   Take 1 Tab by mouth every day.  Dose: 5 mg     oxyCODONE-acetaminophen 5-325 MG Tabs  Commonly known as: PERCOCET   Take 1 Tab by mouth every four hours as needed for up to 12 days.  Dose: 1 Tab        CONTINUE taking these medications      Instructions   aspirin EC 81 MG Tbec  Commonly known as: ECOTRIN   Take 1 Tab by mouth every day.  Dose: 81 mg     atorvastatin 40 MG Tabs  Commonly known as: LIPITOR   Take 1 Tab by mouth every bedtime.  Dose: 40 mg     carvedilol 25 MG Tabs  Commonly known as: COREG   Take 1 Tab by mouth 2 times a day, with meals.  Dose: 25 mg     CENTRUM SILVER PO   Take 1 Tab by mouth every day.  Dose: 1 Tab     losartan 100 MG Tabs  Commonly known as: COZAAR   Take 1 Tab by mouth every day.  Dose: 100 mg     metFORMIN 500 MG Tabs  Commonly known as: GLUCOPHAGE   Take 1 Tab by mouth 2 times a day, with meals.  Dose: 500 mg     nitroglycerin 0.4 MG Subl  Commonly known as: NITROSTAT   Place 1 Tab under tongue as needed for Chest Pain (Take 5 minutes apart, if after 2nd dose you still have CP take a 3rd and call 911.).  Dose: 0.4 mg     prasugrel 10 MG Tabs  Commonly known as: EFFIENT   Take 1 Tab by mouth every day.  Dose: 10 mg        STOP taking these medications    HYDROcodone-acetaminophen 5-325 MG Tabs per tablet  Commonly known as: NORCO            Allergies  Allergies    Allergen Reactions   • Levofloxacin Unspecified     GI Bleeding     • Other Drug      Oral talwin       DIET  Orders Placed This Encounter   Procedures   • Diet Order Cardiac, Diabetic     Standing Status:   Standing     Number of Occurrences:   1     Order Specific Question:   Diet:     Answer:   Cardiac [6]     Order Specific Question:   Diet:     Answer:   Diabetic [3]       ACTIVITY  As tolerated.  Weight bearing as tolerated    CONSULTATIONS  Urology    PROCEDURES  DATE OF SERVICE:  08/26/2020     PREOPERATIVE DIAGNOSES:  1.  History of prolonged priapism, status post glandular-corporal cavernosal   to corporal spongiosal shunt with snake procedure on 07/22/2020.  2.  Distal glandular eschar and incisional wound eschar with necrotic tissue.  3.  Borderline diabetes mellitus.  4.  Significant hypertension with atherosclerotic coronary artery disease.     OPERATION AND PROCEDURES PERFORMED:  Emergent debridement of glandular   necrotic tissue in the left glans and debridement of glandular incisional   wound with placement of corporal cavernosal drains.     SURGEON:  Homer Gomez MD     ANESTHESIA:  General endotracheal.     ANESTHESIOLOGIST:  Romaine Hernandez MD     POSTOPERATIVE DIAGNOSES:  1.  History of prolonged priapism, status post glandular-corporal cavernosal   to corporal spongiosal shunt with snake procedure on 07/22/2020.  2.  Distal glandular eschar and incisional wound eschar with necrotic tissue.  3.  Borderline diabetes mellitus.  4.  Significant hypertension with atherosclerotic coronary artery disease.     COMPLICATIONS:  None.     DRAINS:  Quarter-inch Penrose in the left and right corporal cavernosal   bodies.     SPECIMENS:  Necrotic tissue to pathology for permanent section.     INDICATIONS:  The patient is a 60-year-old male with a history of significant   hypertension, atherosclerotic coronary artery disease and prolonged priapism,   status post a priapism surgery which resulted in some  detumescence, but   essentially a fibrotic corpora cavernosa were noted in both bodies.  The   patient underwent a snake procedure with corporal-cavernosal and   corporal-glandular spongiosal shunt back in 07/2020.  Postoperatively, he did   well initially, but he now has developed necrosis and pain and swelling in the   glans  He was improving, but this started approximately 40 hours prior to   surgery.  There is an area of eschar on the left glans with necrotic tissue   and an area at the base of the penis with necrotic tissue with new swelling.    Patient was started on antibiotics and I discussed with the patient the   importance of urgent debridement and prior to surgery, he had significant   hypertension, anesthesia was concerned about proceeding with surgery; and   explained the emergent nature of the procedure given the developments over the   last several days and the fact that there is evidence of necrosis on the   glans and the base of the wound.  I recommended surgery in hopes of avoiding   any amputation of the phallus and the patient is aware of the risk medically   and surgically of the procedure including but not limited to risk of worsening   infection requiring reoperation.  He is aware of the potential risk of   urinary tract infection, the risk of postoperative pain, swelling, the   potential need for additional surgery including the possibility of partial   penectomy.  I have also discussed with the patient the fact that his wound   healing will be delayed due to his underlying vascular disease.  We have also   discussed the need for wound care as well as postoperative pain management and   we have discussed the fact previously when I met him that he is very unlikely   to have spontaneous erections moving forward.  In addition, we discussed the   perioperative risk of deep vein thrombosis, pulmonary embolism, aspiration   pneumonia, heart attack, stroke and death and the fact that heart attack  and   stroke are at high increased risk due to his current high blood pressure.     DESCRIPTION IN DETAIL:  After informed consent was obtained, the patient was   brought to the operating room, placed supine.  Bilateral sequential   compression devices were in place.  A general anesthetic was administered with   an endotracheal tube in a balanced and methodical fashion by Dr. Romaine Hernandez   for patient's safety.  Medications are administered decreasing anxiety as well   as blood pressure slowly.  Endotracheal tube was positioned and an arterial   line was placed due to noninvasive blood pressures in the 180-200 range.  Once   the patient was stable, the operative area was Betadine prepped and draped in   usual sterile fashion.  A surgical timeout was called.  All members of the   operative team agreed as to the patient's name, procedure to be performed   without objections, attention was directed towards procedure.     I began the procedure by performing a penile block; 0.25% plain Marcaine was   injected circumferentially at the base of the penis, approximately 10 mL were   placed at the beginning of the case,  I then proceeded to dissect the distal   left glans and eschar was noted.  There was some exudate underneath it.  All   of this was debrided to healthy glans.  There was a small amount of bleeding   noted on the frenulum area and the meatus was patent.  At this point in time,   attention was directed to the surgical scar which had dehisced.  The wound had   opened.  There was a necrotic nonviable tissue both proximally and distally   which was debrided with cutting current with the cautery.  I then was able to   identify where the corporal cavernosal to glandular shunt had been performed.    The corporal bodies were opened.  I placed Metzenbaum scissors into each to   make sure there was not a corporal cavernositis.  There was some gelatinous   old clot on the left side, just a small amount of dark blood on  the right   side.  This was irrigated copiously.  The bed of the resection was performed.    At this point in time, to avoid a corporal cavernositis moving forward, I   placed a quarter-inch Penrose in each corporal body.  These were brought out   and secured to the glans on the proximal side where there was some blood   supply noted with 4-0 nylon sutures.  A total of 2 on each side, to keep these   in place.  I then took a single 3-0 chromic in the midline and reapproximated   the tissue to hopefully facilitate wound healing and at the end of the case,   an additional 10 mL of 0.25% Marcaine was injected at the base of the penis.    A Xeroform dressing was cut around the drain leaving opening for the meatus   for voiding and then 4x4's were positioned dorsally and ventrally and ACE tape   was applied softly to avoid any compression.  At the end of the case, sponge,   instrument and needle counts were correct x2.  The patient had tolerated the   procedure well.  He was awakened in the operating room and transferred to the   recovery room where he arrived in stable condition, however, still quite   hypertensive and the hospitalist and intensivist were consulted for managing   his postoperative medical care.    LABORATORY  Lab Results   Component Value Date    SODIUM 134 (L) 08/28/2020    POTASSIUM 4.3 08/28/2020    CHLORIDE 98 08/28/2020    CO2 22 08/28/2020    GLUCOSE 132 (H) 08/28/2020    BUN 36 (H) 08/28/2020    CREATININE 1.72 (H) 08/28/2020        Lab Results   Component Value Date    WBC 12.5 (H) 08/28/2020    HEMOGLOBIN 11.9 (L) 08/28/2020    HEMATOCRIT 35.8 (L) 08/28/2020    PLATELETCT 256 08/28/2020        Total time of the discharge process exceeds 32 minutes.

## 2020-08-31 LAB
COLLECT DURATION TIME SPEC: NORMAL HRS
CREAT 24H UR-MCNC: 121 MG/DL
CREAT 24H UR-MRATE: NORMAL MG/D (ref 800–2100)
SPECIMEN VOL ?TM UR: NORMAL ML
VMA & CREAT 24H UR-IMP: NORMAL
VMA 24H UR-MCNC: 3 MG/L
VMA 24H UR-MRATE: NORMAL MG/D (ref 0–7)
VMA/CREAT 24H UR: 2 MG/GCR (ref 0–6)

## 2020-09-23 ENCOUNTER — HOSPITAL ENCOUNTER (OUTPATIENT)
Dept: LAB | Facility: MEDICAL CENTER | Age: 60
End: 2020-09-23
Attending: INTERNAL MEDICINE
Payer: COMMERCIAL

## 2020-09-23 ENCOUNTER — HOSPITAL ENCOUNTER (OUTPATIENT)
Dept: LAB | Facility: MEDICAL CENTER | Age: 60
End: 2020-09-23
Attending: FAMILY MEDICINE
Payer: COMMERCIAL

## 2020-09-23 DIAGNOSIS — E11.69 DIABETES MELLITUS TYPE 2 IN OBESE: ICD-10-CM

## 2020-09-23 DIAGNOSIS — Z11.59 NEED FOR HEPATITIS C SCREENING TEST: ICD-10-CM

## 2020-09-23 DIAGNOSIS — E66.9 DIABETES MELLITUS TYPE 2 IN OBESE: ICD-10-CM

## 2020-09-23 DIAGNOSIS — N18.30 CKD (CHRONIC KIDNEY DISEASE) STAGE 3, GFR 30-59 ML/MIN: ICD-10-CM

## 2020-09-23 LAB
ANION GAP SERPL CALC-SCNC: 14 MMOL/L (ref 7–16)
BUN SERPL-MCNC: 28 MG/DL (ref 8–22)
CALCIUM SERPL-MCNC: 9.5 MG/DL (ref 8.5–10.5)
CHLORIDE SERPL-SCNC: 98 MMOL/L (ref 96–112)
CO2 SERPL-SCNC: 24 MMOL/L (ref 20–33)
CREAT SERPL-MCNC: 1.81 MG/DL (ref 0.5–1.4)
EST. AVERAGE GLUCOSE BLD GHB EST-MCNC: 137 MG/DL
GLUCOSE SERPL-MCNC: 93 MG/DL (ref 65–99)
HBA1C MFR BLD: 6.4 % (ref 0–5.6)
HCV AB SER QL: NORMAL
POTASSIUM SERPL-SCNC: 4.4 MMOL/L (ref 3.6–5.5)
SODIUM SERPL-SCNC: 136 MMOL/L (ref 135–145)

## 2020-09-23 PROCEDURE — 80048 BASIC METABOLIC PNL TOTAL CA: CPT

## 2020-09-23 PROCEDURE — 86803 HEPATITIS C AB TEST: CPT

## 2020-09-23 PROCEDURE — 36415 COLL VENOUS BLD VENIPUNCTURE: CPT

## 2020-09-23 PROCEDURE — 83036 HEMOGLOBIN GLYCOSYLATED A1C: CPT

## 2020-09-30 ENCOUNTER — PRE-ADMISSION TESTING (OUTPATIENT)
Dept: ADMISSIONS | Facility: MEDICAL CENTER | Age: 60
End: 2020-09-30
Attending: UROLOGY
Payer: COMMERCIAL

## 2020-09-30 DIAGNOSIS — Z01.812 PRE-OPERATIVE LABORATORY EXAMINATION: ICD-10-CM

## 2020-09-30 LAB
COVID ORDER STATUS COVID19: NORMAL
ERYTHROCYTE [DISTWIDTH] IN BLOOD BY AUTOMATED COUNT: 43.5 FL (ref 35.9–50)
HCT VFR BLD AUTO: 37.7 % (ref 42–52)
HGB BLD-MCNC: 12.1 G/DL (ref 14–18)
MCH RBC QN AUTO: 29.7 PG (ref 27–33)
MCHC RBC AUTO-ENTMCNC: 32.1 G/DL (ref 33.7–35.3)
MCV RBC AUTO: 92.6 FL (ref 81.4–97.8)
PLATELET # BLD AUTO: 239 K/UL (ref 164–446)
PMV BLD AUTO: 11.3 FL (ref 9–12.9)
RBC # BLD AUTO: 4.07 M/UL (ref 4.7–6.1)
SARS-COV-2 RNA RESP QL NAA+PROBE: NOTDETECTED
SPECIMEN SOURCE: NORMAL
WBC # BLD AUTO: 8.2 K/UL (ref 4.8–10.8)

## 2020-09-30 PROCEDURE — 85027 COMPLETE CBC AUTOMATED: CPT

## 2020-09-30 PROCEDURE — C9803 HOPD COVID-19 SPEC COLLECT: HCPCS

## 2020-09-30 PROCEDURE — 36415 COLL VENOUS BLD VENIPUNCTURE: CPT

## 2020-09-30 PROCEDURE — U0003 INFECTIOUS AGENT DETECTION BY NUCLEIC ACID (DNA OR RNA); SEVERE ACUTE RESPIRATORY SYNDROME CORONAVIRUS 2 (SARS-COV-2) (CORONAVIRUS DISEASE [COVID-19]), AMPLIFIED PROBE TECHNIQUE, MAKING USE OF HIGH THROUGHPUT TECHNOLOGIES AS DESCRIBED BY CMS-2020-01-R: HCPCS

## 2020-09-30 ASSESSMENT — FIBROSIS 4 INDEX: FIB4 SCORE: 0.8

## 2020-09-30 NOTE — OR NURSING
Spoke to Dr Jason Ascencio's surgery scheduler, requesting that they call patient with instructions for his ASA and Effient for upcoming surgery on 10/2/20

## 2020-10-02 ENCOUNTER — ANESTHESIA (OUTPATIENT)
Dept: SURGERY | Facility: MEDICAL CENTER | Age: 60
End: 2020-10-02
Payer: COMMERCIAL

## 2020-10-02 ENCOUNTER — APPOINTMENT (OUTPATIENT)
Dept: RADIOLOGY | Facility: MEDICAL CENTER | Age: 60
End: 2020-10-02
Attending: UROLOGY
Payer: COMMERCIAL

## 2020-10-02 ENCOUNTER — HOSPITAL ENCOUNTER (OUTPATIENT)
Facility: MEDICAL CENTER | Age: 60
End: 2020-10-02
Attending: UROLOGY | Admitting: UROLOGY
Payer: COMMERCIAL

## 2020-10-02 ENCOUNTER — ANESTHESIA EVENT (OUTPATIENT)
Dept: SURGERY | Facility: MEDICAL CENTER | Age: 60
End: 2020-10-02
Payer: COMMERCIAL

## 2020-10-02 VITALS
TEMPERATURE: 97 F | HEART RATE: 67 BPM | DIASTOLIC BLOOD PRESSURE: 89 MMHG | RESPIRATION RATE: 16 BRPM | HEIGHT: 70 IN | WEIGHT: 204.15 LBS | OXYGEN SATURATION: 95 % | SYSTOLIC BLOOD PRESSURE: 156 MMHG | BODY MASS INDEX: 29.23 KG/M2

## 2020-10-02 LAB — GLUCOSE BLD-MCNC: 97 MG/DL (ref 65–99)

## 2020-10-02 PROCEDURE — 700105 HCHG RX REV CODE 258: Performed by: UROLOGY

## 2020-10-02 PROCEDURE — 160002 HCHG RECOVERY MINUTES (STAT): Performed by: UROLOGY

## 2020-10-02 PROCEDURE — 700111 HCHG RX REV CODE 636 W/ 250 OVERRIDE (IP): Performed by: ANESTHESIOLOGY

## 2020-10-02 PROCEDURE — 160025 RECOVERY II MINUTES (STATS): Performed by: UROLOGY

## 2020-10-02 PROCEDURE — 160036 HCHG PACU - EA ADDL 30 MINS PHASE I: Performed by: UROLOGY

## 2020-10-02 PROCEDURE — 160028 HCHG SURGERY MINUTES - 1ST 30 MINS LEVEL 3: Performed by: UROLOGY

## 2020-10-02 PROCEDURE — 500257: Performed by: UROLOGY

## 2020-10-02 PROCEDURE — 501330 HCHG SET, CYSTO IRRIG TUBING: Performed by: UROLOGY

## 2020-10-02 PROCEDURE — 82962 GLUCOSE BLOOD TEST: CPT

## 2020-10-02 PROCEDURE — 160035 HCHG PACU - 1ST 60 MINS PHASE I: Performed by: UROLOGY

## 2020-10-02 PROCEDURE — 160048 HCHG OR STATISTICAL LEVEL 1-5: Performed by: UROLOGY

## 2020-10-02 PROCEDURE — 700101 HCHG RX REV CODE 250: Performed by: ANESTHESIOLOGY

## 2020-10-02 PROCEDURE — A9270 NON-COVERED ITEM OR SERVICE: HCPCS | Performed by: ANESTHESIOLOGY

## 2020-10-02 PROCEDURE — 700102 HCHG RX REV CODE 250 W/ 637 OVERRIDE(OP): Performed by: ANESTHESIOLOGY

## 2020-10-02 PROCEDURE — 501838 HCHG SUTURE GENERAL: Performed by: UROLOGY

## 2020-10-02 PROCEDURE — 160039 HCHG SURGERY MINUTES - EA ADDL 1 MIN LEVEL 3: Performed by: UROLOGY

## 2020-10-02 PROCEDURE — 700101 HCHG RX REV CODE 250: Performed by: UROLOGY

## 2020-10-02 PROCEDURE — 160009 HCHG ANES TIME/MIN: Performed by: UROLOGY

## 2020-10-02 PROCEDURE — 160046 HCHG PACU - 1ST 60 MINS PHASE II: Performed by: UROLOGY

## 2020-10-02 RX ORDER — OXYCODONE HYDROCHLORIDE AND ACETAMINOPHEN 5; 325 MG/1; MG/1
2 TABLET ORAL
Status: COMPLETED | OUTPATIENT
Start: 2020-10-02 | End: 2020-10-02

## 2020-10-02 RX ORDER — MAGNESIUM SULFATE HEPTAHYDRATE 40 MG/ML
INJECTION, SOLUTION INTRAVENOUS PRN
Status: DISCONTINUED | OUTPATIENT
Start: 2020-10-02 | End: 2020-10-02 | Stop reason: SURG

## 2020-10-02 RX ORDER — HYDROMORPHONE HYDROCHLORIDE 2 MG/ML
INJECTION, SOLUTION INTRAMUSCULAR; INTRAVENOUS; SUBCUTANEOUS PRN
Status: DISCONTINUED | OUTPATIENT
Start: 2020-10-02 | End: 2020-10-02 | Stop reason: SURG

## 2020-10-02 RX ORDER — CEFAZOLIN SODIUM 1 G/3ML
INJECTION, POWDER, FOR SOLUTION INTRAMUSCULAR; INTRAVENOUS PRN
Status: DISCONTINUED | OUTPATIENT
Start: 2020-10-02 | End: 2020-10-02 | Stop reason: SURG

## 2020-10-02 RX ORDER — SODIUM CHLORIDE, SODIUM LACTATE, POTASSIUM CHLORIDE, CALCIUM CHLORIDE 600; 310; 30; 20 MG/100ML; MG/100ML; MG/100ML; MG/100ML
INJECTION, SOLUTION INTRAVENOUS CONTINUOUS
Status: DISCONTINUED | OUTPATIENT
Start: 2020-10-02 | End: 2020-10-02 | Stop reason: HOSPADM

## 2020-10-02 RX ORDER — ONDANSETRON 2 MG/ML
4 INJECTION INTRAMUSCULAR; INTRAVENOUS
Status: COMPLETED | OUTPATIENT
Start: 2020-10-02 | End: 2020-10-02

## 2020-10-02 RX ORDER — ONDANSETRON 2 MG/ML
INJECTION INTRAMUSCULAR; INTRAVENOUS PRN
Status: DISCONTINUED | OUTPATIENT
Start: 2020-10-02 | End: 2020-10-02 | Stop reason: SURG

## 2020-10-02 RX ORDER — DEXMEDETOMIDINE HYDROCHLORIDE 100 UG/ML
INJECTION, SOLUTION INTRAVENOUS PRN
Status: DISCONTINUED | OUTPATIENT
Start: 2020-10-02 | End: 2020-10-02 | Stop reason: SURG

## 2020-10-02 RX ORDER — BUPIVACAINE HYDROCHLORIDE 5 MG/ML
INJECTION, SOLUTION EPIDURAL; INTRACAUDAL
Status: DISCONTINUED | OUTPATIENT
Start: 2020-10-02 | End: 2020-10-02 | Stop reason: HOSPADM

## 2020-10-02 RX ORDER — HALOPERIDOL 5 MG/ML
1 INJECTION INTRAMUSCULAR
Status: DISCONTINUED | OUTPATIENT
Start: 2020-10-02 | End: 2020-10-02 | Stop reason: HOSPADM

## 2020-10-02 RX ORDER — DIPHENHYDRAMINE HYDROCHLORIDE 50 MG/ML
12.5 INJECTION INTRAMUSCULAR; INTRAVENOUS
Status: DISCONTINUED | OUTPATIENT
Start: 2020-10-02 | End: 2020-10-02 | Stop reason: HOSPADM

## 2020-10-02 RX ORDER — OXYCODONE HYDROCHLORIDE AND ACETAMINOPHEN 5; 325 MG/1; MG/1
1 TABLET ORAL
Status: COMPLETED | OUTPATIENT
Start: 2020-10-02 | End: 2020-10-02

## 2020-10-02 RX ADMIN — MAGNESIUM SULFATE IN WATER 1 G: 40 INJECTION, SOLUTION INTRAVENOUS at 17:03

## 2020-10-02 RX ADMIN — ONDANSETRON 4 MG: 2 INJECTION INTRAMUSCULAR; INTRAVENOUS at 17:58

## 2020-10-02 RX ADMIN — SODIUM CHLORIDE, POTASSIUM CHLORIDE, SODIUM LACTATE AND CALCIUM CHLORIDE: 600; 310; 30; 20 INJECTION, SOLUTION INTRAVENOUS at 16:30

## 2020-10-02 RX ADMIN — PROPOFOL 200 MG: 10 INJECTION, EMULSION INTRAVENOUS at 16:38

## 2020-10-02 RX ADMIN — FENTANYL CITRATE 100 MCG: 50 INJECTION, SOLUTION INTRAMUSCULAR; INTRAVENOUS at 16:38

## 2020-10-02 RX ADMIN — DEXMEDETOMIDINE HYDROCHLORIDE 20 MCG: 100 INJECTION, SOLUTION INTRAVENOUS at 17:00

## 2020-10-02 RX ADMIN — ONDANSETRON 4 MG: 2 INJECTION INTRAMUSCULAR; INTRAVENOUS at 17:25

## 2020-10-02 RX ADMIN — LIDOCAINE HYDROCHLORIDE 0.5 ML: 10 INJECTION, SOLUTION EPIDURAL; INFILTRATION; INTRACAUDAL at 16:33

## 2020-10-02 RX ADMIN — CEFAZOLIN 2 G: 330 INJECTION, POWDER, FOR SOLUTION INTRAMUSCULAR; INTRAVENOUS at 16:40

## 2020-10-02 RX ADMIN — DEXMEDETOMIDINE HYDROCHLORIDE 20 MCG: 100 INJECTION, SOLUTION INTRAVENOUS at 16:38

## 2020-10-02 RX ADMIN — MAGNESIUM SULFATE IN WATER 1 G: 40 INJECTION, SOLUTION INTRAVENOUS at 17:14

## 2020-10-02 RX ADMIN — OXYCODONE HYDROCHLORIDE AND ACETAMINOPHEN 2 TABLET: 5; 325 TABLET ORAL at 17:58

## 2020-10-02 RX ADMIN — MIDAZOLAM 2 MG: 1 INJECTION INTRAMUSCULAR; INTRAVENOUS at 16:38

## 2020-10-02 RX ADMIN — HYDROMORPHONE HYDROCHLORIDE 0.5 MG: 2 INJECTION, SOLUTION INTRAMUSCULAR; INTRAVENOUS; SUBCUTANEOUS at 17:00

## 2020-10-02 RX ADMIN — FENTANYL CITRATE 50 MCG: 50 INJECTION INTRAMUSCULAR; INTRAVENOUS at 17:58

## 2020-10-02 ASSESSMENT — FIBROSIS 4 INDEX: FIB4 SCORE: 0.86

## 2020-10-02 ASSESSMENT — PAIN DESCRIPTION - PAIN TYPE
TYPE: SURGICAL PAIN

## 2020-10-02 ASSESSMENT — PAIN SCALES - GENERAL: PAIN_LEVEL: 2

## 2020-10-02 NOTE — ANESTHESIA PREPROCEDURE EVALUATION
Relevant Problems   CARDIAC   (+) Ascending aorta dilatation ([facility])   (+) Coronary artery disease involving native coronary artery with unstable angina pectoris ([facility])   (+) Essential hypertension   (+) Essential hypertension, benign   (+) Hypertensive urgency   (+) NSTEMI (non-ST elevated myocardial infarction) ([facility])   (+) Premature atrial complex         (+) CRISELDA (acute kidney injury) ([facility])   (+) Kidney stone   (+) Renal cyst   (+) Renal insufficiency syndrome      ENDO   (+) Type 2 diabetes mellitus with stage 2 chronic kidney disease, without long-term current use of insulin ([facility])       Physical Exam    Airway   Mallampati: II  TM distance: >3 FB  Neck ROM: full       Cardiovascular - normal exam  Rhythm: regular  Rate: normal  (-) murmur     Dental - normal exam           Pulmonary - normal exam  Breath sounds clear to auscultation     Abdominal    Neurological - normal exam                 Anesthesia Plan    ASA 3   ASA physical status 3 criteria: CAD/stents (> 3 months)    Plan - general       Airway plan will be ETT      Plan Factors:   Patient was previously instructed to abstain from smoking on day of procedure.  Patient did not smoke on day of procedure.      Induction: intravenous    Postoperative Plan: Postoperative administration of opioids is intended.    Pertinent diagnostic labs and testing reviewed    Informed Consent:    Anesthetic plan and risks discussed with patient.    Use of blood products discussed with: patient whom consented to blood products.

## 2020-10-03 NOTE — OR SURGEON
Immediate Post OP Note    PreOp Diagnosis: Complex penile glans wound                                 History of Priapism    PostOp Diagnosis: As above    Procedure(s):  CYSTOSCOPY- FLEXIBLE - Wound Class: Clean  DEBRIDEMENT OF PENILE GLANDS AND CLOSURE OF COMPLEX PENILE WOUND - Wound Class: Clean    Surgeon(s):  Homer Gomez M.D.    Anesthesiologist/Type of Anesthesia:  Anesthesiologist: Ezra Smalls M.D./General LMA    Surgical Staff:  Circulator: Dimple Cole R.N.  Scrub Person: Rahul Mccoy  Sedation/Monitoring Nurse: Amy Carrasquillo R.N.    Specimens removed if any:  None submitted    Estimated Blood Loss: 15ml    Findings: Open glans with no evidence of fistula    Complications: None        10/2/2020 5:49 PM Homer Gomez M.D.

## 2020-10-03 NOTE — ANESTHESIA TIME REPORT
Anesthesia Start and Stop Event Times     Date Time Event    10/2/2020 1606 Ready for Procedure     1633 Anesthesia Start        Responsible Staff  10/02/20    Name Role Begin End    Ezra Smalls M.D. Anesth 1633         Preop Diagnosis (Free Text):  Pre-op Diagnosis     PRIAPISM        Preop Diagnosis (Codes):    Post op Diagnosis  Priapism      Premium Reason  A. 3PM - 7AM    Comments:

## 2020-10-03 NOTE — ANESTHESIA POSTPROCEDURE EVALUATION
Patient: Conner Fox    Procedure Summary     Date: 10/02/20 Room / Location: [place] [address] / SURGERY [facility]    Anesthesia Start: 1633 Anesthesia Stop:     Procedures:       CYSTOSCOPY- FLEXIBLE (N/A Penis)      CLOSURE, WOUND, GENERAL- DEBRIDEMENT OF PENILE GLANDS AND CLOSURE OF COMPLEX PENILE WOUND (N/A Penis) Diagnosis: (PRIAPISM)    Surgeon: Homer Gomez M.D. Responsible Provider: Ezra Smalls M.D.    Anesthesia Type: general ASA Status: 3          Final Anesthesia Type: general  Last vitals  BP   Blood Pressure: (!) 169/110(MD notified, aware)    Temp   36.4 °C (97.6 °F)    Pulse   Pulse: 82   Resp   18    SpO2   100 %      Anesthesia Post Evaluation    Patient location during evaluation: PACU  Patient participation: complete - patient participated  Level of consciousness: awake and alert  Pain score: 2    Airway patency: patent  Anesthetic complications: no  Cardiovascular status: hemodynamically stable  Respiratory status: acceptable  Hydration status: euvolemic    PONV: none           Nurse Pain Score: 4 (NPRS)

## 2020-10-03 NOTE — OR NURSING
Patient states ready to d/c home. Prescriptions given. Pt dc'd in w/c with CNA in stable condition.

## 2020-10-03 NOTE — DISCHARGE INSTRUCTIONS
ACTIVITY: Rest and take it easy for the first 24 hours.  A responsible adult is recommended to remain with you during that time.  It is normal to feel sleepy.  We encourage you to not do anything that requires balance, judgment or coordination.    MILD FLU-LIKE SYMPTOMS ARE NORMAL. YOU MAY EXPERIENCE GENERALIZED MUSCLE ACHES, THROAT IRRITATION, HEADACHE AND/OR SOME NAUSEA.    FOR 24 HOURS DO NOT:  Drive, operate machinery or run household appliances.  Drink beer or alcoholic beverages.   Make important decisions or sign legal documents.    SPECIAL INSTRUCTIONS:   Diet: Clears ADAT to regular.  Follow-up in office on Tuesday with Dr. Gomez for a wound check.  Keep wounds clean and dry. May take down dressing to facilitate voiding if necessary.      DIET: To avoid nausea, slowly advance diet as tolerated, avoiding spicy or greasy foods for the first day.  Add more substantial food to your diet according to your physician's instructions.    INCREASE FLUIDS AND FIBER TO AVOID CONSTIPATION.    SURGICAL DRESSING/BATHING: Follow MD instructions    FOLLOW-UP APPOINTMENT:  A follow-up appointment should be arranged with your doctor in 1-2 weeks ; call to schedule.    You should CALL YOUR PHYSICIAN if you develop:  Fever greater than 101 degrees F.  Pain not relieved by medication, or persistent nausea or vomiting.  Excessive bleeding (blood soaking through dressing) or unexpected drainage from the wound.  Extreme redness or swelling around the incision site, drainage of pus or foul smelling drainage.  Inability to urinate or empty your bladder within 8 hours.  Problems with breathing or chest pain.    You should call 911 if you develop problems with breathing or chest pain.  If you are unable to contact your doctor or surgical center, you should go to the nearest emergency room or urgent care center.  Dr Gomez  Physician's telephone #: [phone]    If any questions arise, call your doctor.  If your doctor is not  available, please feel free to call the Surgical Center at [phone]. The Contact Center is open Monday through Friday 7AM to 5PM and may speak to a nurse at [phone], or toll free at [phone].     A registered nurse may call you a few days after your surgery to see how you are doing after your procedure.    MEDICATIONS: Resume taking daily medication.  Take prescribed pain medication with food.  If no medication is prescribed, you may take non-aspirin pain medication if needed.  PAIN MEDICATION CAN BE VERY CONSTIPATING.  Take a stool softener or laxative such as senokot, pericolace, or milk of magnesia if needed.    Prescription given for Norco   Last pain medication given at 5:58    If your physician has prescribed pain medication that includes Acetaminophen (Tylenol), do not take additional Acetaminophen (Tylenol) while taking the prescribed medication.    Depression / Suicide Risk    As you are discharged from this [facility] facility, it is important to learn how to keep safe from harming yourself.    Recognize the warning signs:  · Abrupt changes in personality, positive or negative- including increase in energy   · Giving away possessions  · Change in eating patterns- significant weight changes-  positive or negative  · Change in sleeping patterns- unable to sleep or sleeping all the time   · Unwillingness or inability to communicate  · Depression  · Unusual sadness, discouragement and loneliness  · Talk of wanting to die  · Neglect of personal appearance   · Rebelliousness- reckless behavior  · Withdrawal from people/activities they love  · Confusion- inability to concentrate     If you or a loved one observes any of these behaviors or has concerns about self-harm, here's what you can do:  · Talk about it- your feelings and reasons for harming yourself  · Remove any means that you might use to hurt yourself (examples: pills, rope, extension cords, firearm)  · Get professional help from the  community (Mental Health, Substance Abuse, psychological counseling)  · Do not be alone:Call your Safe Contact- someone whom you trust who will be there for you.  · Call your local CRISIS HOTLINE 084-4635 or 472-076-0444  · Call your local Children's Mobile Crisis Response Team [place] (768) 403-0690 or www.NuORDER  · Call the toll free National Suicide Prevention Hotlines   · National Suicide Prevention Lifeline 580-768-YOEK (6973)  · National Hope Line Network 800-SUICIDE (689-7219)

## 2020-10-03 NOTE — OR NURSING
Nausea improved pt tolerating ice chips. Family contacted re discharge.  Discharge instructions completed.

## 2020-10-03 NOTE — OR NURSING
Pt's VSS; denies N/V; states pain is at tolerable level. Dressing CDI to  Penis. Small emesis on arrival from PACU,

## 2020-10-06 ENCOUNTER — OFFICE VISIT (OUTPATIENT)
Dept: MEDICAL GROUP | Facility: MEDICAL CENTER | Age: 60
End: 2020-10-06
Payer: COMMERCIAL

## 2020-10-06 VITALS
DIASTOLIC BLOOD PRESSURE: 90 MMHG | OXYGEN SATURATION: 99 % | WEIGHT: 212 LBS | BODY MASS INDEX: 30.35 KG/M2 | RESPIRATION RATE: 16 BRPM | HEIGHT: 70 IN | HEART RATE: 61 BPM | TEMPERATURE: 97 F | SYSTOLIC BLOOD PRESSURE: 170 MMHG

## 2020-10-06 DIAGNOSIS — I10 ESSENTIAL HYPERTENSION: ICD-10-CM

## 2020-10-06 DIAGNOSIS — Z87.438 HISTORY OF PRIAPISM: ICD-10-CM

## 2020-10-06 DIAGNOSIS — E11.22 TYPE 2 DIABETES MELLITUS WITH STAGE 2 CHRONIC KIDNEY DISEASE, WITHOUT LONG-TERM CURRENT USE OF INSULIN (HCC): ICD-10-CM

## 2020-10-06 DIAGNOSIS — Z95.5 HISTORY OF CORONARY ARTERY STENT PLACEMENT: ICD-10-CM

## 2020-10-06 DIAGNOSIS — N18.2 TYPE 2 DIABETES MELLITUS WITH STAGE 2 CHRONIC KIDNEY DISEASE, WITHOUT LONG-TERM CURRENT USE OF INSULIN (HCC): ICD-10-CM

## 2020-10-06 PROCEDURE — 99214 OFFICE O/P EST MOD 30 MIN: CPT | Performed by: FAMILY MEDICINE

## 2020-10-06 RX ORDER — HYDROCODONE BITARTRATE AND ACETAMINOPHEN 10; 325 MG/1; MG/1
1-2 TABLET ORAL EVERY 8 HOURS PRN
COMMUNITY
End: 2021-06-25

## 2020-10-06 RX ORDER — AMLODIPINE BESYLATE 10 MG/1
10 TABLET ORAL DAILY
Qty: 90 TAB | Refills: 1 | Status: SHIPPED | OUTPATIENT
Start: 2020-10-06 | End: 2021-03-26

## 2020-10-06 RX ORDER — MELOXICAM 15 MG/1
15 TABLET ORAL PRN
COMMUNITY

## 2020-10-06 ASSESSMENT — FIBROSIS 4 INDEX: FIB4 SCORE: 0.86

## 2020-10-06 NOTE — PROGRESS NOTES
CC: Hypertension, diabetes, CAD, history of priapism    HPI:   Conner presents today discuss the following medical issues:    Essential hypertension  Blood pressure today is high.  However patient has been asymptomatic.  Denies any headache, chest pain, or shortness of breath.  He is currently on amlodipine 5 mg,  losartan 100 mg daily, and carvedilol 25 mg twice a day.    Type 2 diabetes mellitus with stage 2 chronic kidney disease, without long-term current use of insulin (formerly [facility])  Blood glucose has been adequately controlled.  Most recent A1c was 6.4.  Denies polyuria and polydipsia.  He is currently on metformin 500 mg twice a day.  No side effects    History of coronary artery stent placement  Patient has been asymptomatic.Patient has history of non-ST elevation MI in January 2020, underwent stent placement x2.    Denies any chest pain, shortness of breath.   Last echocardiogram showed normal ejection fraction.  Last ejection fraction showed:  Left ventricular ejection fraction is visually estimated to be 65%.  Posterolateral wall hypokinesis.  Patient has been on atorvastatin 80 mg, carvedilol 25 mg twice a day, Effient 10 mg daily, aspirin 81 mg daily, nitroglycerin sublingual as needed.  Patient has been following up with cardiology regular basis.    History of priapism  Patient developed a persistent erection, went to the ER, underwent an emergency surgical debridement of pineal glands.  Currently asymptomatic.  Has been following up with urology.          Patient Active Problem List    Diagnosis Date Noted   • Priapism, unspecified 07/24/2020     Priority: High   • NSTEMI (non-ST elevated myocardial infarction) (formerly [facility]) 01/10/2020     Priority: High   • Hypertensive urgency 01/10/2020     Priority: High   • Type 2 diabetes mellitus with stage 2 chronic kidney disease, without long-term current use of insulin (formerly [facility]) 08/26/2020     Priority: Medium   • Coronary artery disease involving native coronary artery with  unstable angina pectoris ([facility]) 01/13/2020     Priority: Medium   • CRISELDA (acute kidney injury) ([facility]) 01/10/2020     Priority: Medium   • Elevated hemoglobin A1c 02/21/2020   • Benign prostatic hyperplasia with urinary obstruction 02/18/2020   • Renal cyst 02/18/2020   • Kidney stone 02/18/2020   • Renal insufficiency syndrome 02/18/2020   • Essential hypertension, benign 02/06/2020   • Ischemic cardiomyopathy 01/23/2020   • Ascending aorta dilatation ([facility]) 01/23/2020   • Essential hypertension 06/26/2017   • Premature atrial complex 06/26/2017       Current Outpatient Medications   Medication Sig Dispense Refill   • HYDROcodone/acetaminophen (NORCO)  MG Tab hydrocodone 10 mg-acetaminophen 325 mg tablet     • meloxicam (MOBIC) 15 MG tablet meloxicam 15 mg tablet   TAKE 1 TABLET BY MOUTH EVERY DAY     • amLODIPine (NORVASC) 10 MG Tab Take 1 Tab by mouth every day. 90 Tab 1   • Multiple Vitamins-Minerals (CENTRUM SILVER PO) Take 1 Tab by mouth every day.     • metFORMIN (GLUCOPHAGE) 500 MG Tab Take 1 Tab by mouth 2 times a day, with meals. 60 Tab 2   • carvedilol (COREG) 25 MG Tab Take 1 Tab by mouth 2 times a day, with meals. 60 Tab 3   • losartan (COZAAR) 100 MG Tab Take 1 Tab by mouth every day. 30 Tab 2   • prasugrel (EFFIENT) 10 MG Tab Take 1 Tab by mouth every day. 30 Tab 2   • atorvastatin (LIPITOR) 40 MG Tab Take 1 Tab by mouth every bedtime. 90 Tab 0   • aspirin EC (ECOTRIN) 81 MG Tablet Delayed Response Take 1 Tab by mouth every day. 30 Tab 2   • nitroglycerin (NITROSTAT) 0.4 MG SL Tab Place 1 Tab under tongue as needed for Chest Pain (Take 5 minutes apart, if after 2nd dose you still have CP take a 3rd and call 911.). (Patient not taking: Reported on 8/25/2020) 25 Tab 0     No current facility-administered medications for this visit.          Allergies as of 10/06/2020 - Reviewed 10/06/2020   Allergen Reaction Noted   • Levofloxacin Unspecified 01/10/2020   • Other drug  08/26/2020   • Morphine Nausea  "09/30/2020        ROS: Denies any chest pain, Shortness of breath, Changes bowel or bladder, Lower extremity edema.    Physical Exam:  BP (!) 170/90 (BP Location: Right arm, Patient Position: Sitting, BP Cuff Size: Adult)   Pulse 61   Temp 36.1 °C (97 °F) (Temporal)   Resp 16   Ht 1.778 m (5' 10\")   Wt 96.2 kg (212 lb)   SpO2 99%   BMI 30.42 kg/m²   Gen.: Well-developed, well-nourished, no apparent distress,pleasant and cooperative with the examination  Skin:  Warm and dry with good turgor. No rashes or suspicious lesions in visible areas  HEENT:Sinuses nontender with palpation, TMs clear, nares patent with pink mucosa and clear rhinorrhea,no septal deviation ,polyps or lesions. lips without lesions, oropharynx clear.  Neck: Trachea midline,no masses or adenopathy. No JVD.  Cor: Regular rate and rhythm without murmur, gallop or rub.  Lungs: Respirations unlabored.Clear to auscultation with equal breath sounds bilaterally. No wheezes, rhonchi.  Extremities: No cyanosis, clubbing or edema.      Assessment and Plan.   60 y.o. male     1. Essential hypertension  Elevated.  Will increase amlodipine to 10 mg, continue losartan 100 mg daily, and carvedilol 25 mg twice a day.    - amLODIPine (NORVASC) 10 MG Tab; Take 1 Tab by mouth every day.  Dispense: 90 Tab; Refill: 1    2. Type 2 diabetes mellitus with stage 2 chronic kidney disease, without long-term current use of insulin ([facility])  Controlled.  Last A1c was 6.4.  Continue on metformin 500 mg twice a day    3. History of coronary artery stent placement  Stable.  Asymptomatic.  Continue on atorvastatin 40 mg daily, aspirin 81 mg, and Effient 10 mg daily  Continue follow-up with cardiology.    4. History of priapism  Status post surgical debridement of pineal glands.  Continue follow-up with urology.      "

## 2020-10-08 NOTE — OP REPORT
DATE OF SERVICE:  10/02/2020    PREOPERATIVE DIAGNOSES:  1.  Complex penile glandular wound.  2.  History of prolonged priapism, status post Al-Ghorab shunt.  3.  History of glandular necrosis with prior debridement.    OPERATION/PROCEDURE PERFORMED:  Debridement and closure of complex penile   glandular wound.    SURGEON:  Homer Gomez MD    ANESTHESIA:  General laryngeal mask.    ANESTHESIOLOGIST:  Ezra Smalls MD    POSTOPERATIVE DIAGNOSES:  1.  Complex penile glandular wound.  2.  History of prolonged priapism, status post Al-Ghorab shunt.  3.  History of glandular necrosis with prior debridement.    DRAINS:  None.    COMPLICATIONS:  None.    INDICATIONS:  The patient is a pleasant 60-year-old gentleman with a history   of prolonged priapism.  He underwent an aspiration, which failed.  He   underwent an Al-Ghorab shunt, which resulted in some improvement, but he had   had priapism for almost 48 hours, in retrospect possibly even longer according   to the patient.  As such, he had woody fibrosis of the penile shaft and after   the Al-Ghorab shunt, he went on to develop some distal ischemia of the left   glans as well as the wound with a breakdown and infection requiring a take   back for surgery with debridement of necrotic tissue on the left glans, the   wound, and then bilateral corporotomies were performed and drains were   positioned.  He was treated with antibiotic therapy, wound care, and has done   well, and now has evidence of a well-vascularized glans.  The woody induration   of the phallus is still present, but much improved, and he is now pain free.    In the office, I discussed with the patient the need for final closure, as he   has 1.5 cm separation of the mid glans from the proximal glans and he really   needs debridement of the base of the wound, some debridement of the lateral   edges and a multilayer closure.  Prior to surgery, I discussed with the   patient the risks of the procedure  including wound infection, postoperative   pain, swelling, potential loss of the distal glans as still a potential   complication of this complex wound.  We discussed the risk of urethrocutaneous   fistula and I recommended that we perform cystoscopy at the time of   anesthesia, as the wound goes right down close to the urethra in the proximal   glans.  We discussed the risk of cystoscopy including bladder infection,   postoperative urgency, frequency and stricture, as well as the potential need   for additional surgery.  The patient is also well aware of the perioperative   risk of deep vein thrombosis, pulmonary embolism, aspiration pneumonia, heart   attack, stroke, and death.  Informed consent was given to me by the patient to   proceed.    DESCRIPTION IN DETAIL:  After informed consent was obtained, the patient was   brought to the operating room and placed supine.  Bilateral sequential   compression devices were in place and operational.  General laryngeal mask   anesthetic administered in balanced fashion by Dr. Smalls and the patient   received intravenous antibiotics.  The operative area was Betadine prepped and   draped in usual sterile fashion.  A surgical timeout was called.  All members   of the operative team agree as the patient's name, procedure to be performed   without objections, attention was directed towards procedure.    I began the procedure by passing a 14-Angolan flexible cystoscope per urethra.    The urethra was normal.  His prostatic fossa measured 4 cm in length.  His   bladder was normal.  There was no evidence of stone, tumor, or diverticula.    Prostate had bilobar occlusive hypertrophy, but the urethra shows no evidence   of scar tissue or fistula.  After removing the cystoscope, I passed a   16-Angolan Faulkner, but I did not blow up the balloon.  I just left this in the   urethra for the dissection to avoid entry into the urethra, as the depth of   the wound was closed to the anterior  urethra.  At this point in time, I   debrided the base of the wound sharply with curved tenotomy scissors.  All   fibrinous exudate was removed.  I then resected a small amount of lateral   gland on the left and right side, so that it would come together   symmetrically.  At this point in time, the initial deep layer was closed with   4-0 Vicryl in interrupted fashion.  I then did a second layer of 3-0 Vicryl in   an interrupted fashion and after this was performed, the glans was   reapproximated with 3-0 Prolene permanent suture with the middle stitch being   a vertical mattress suture.  At the end of the case, hemostasis was excellent.    Xeroform dressing was applied on the glans and then a 2x2 was positioned and   a light Coban dressing applied.  The Faulkner, which was in the urethra during   the repair, was removed.  There was no evidence of injury in the urethra.  He   tolerated the procedure well, which went without complication.  Sponge,   instrument, and needle counts were correct x2.  Awakened in the operating room   and transferred to recovery room where he arrived in stable condition.       ____________________________________     MD RO Young / RUTH    DD:  10/08/2020 12:41:08  DT:  10/08/2020 13:05:45    D#:  [id number]  Job#:  [id number]

## 2020-10-09 RX ORDER — LOSARTAN POTASSIUM 100 MG/1
100 TABLET ORAL DAILY
Qty: 30 TAB | Refills: 2 | Status: SHIPPED | OUTPATIENT
Start: 2020-10-09 | End: 2021-01-04

## 2020-10-16 NOTE — DOCUMENTATION QUERY
[facility]                                                                       Query Response Note      PATIENT:               DEANNA DUEÑAS  ACCT #:                  [id number]  MRN:                     [id number]  :                      1960  ADMIT DATE:       10/2/2020 3:32 PM  DISCH DATE:          RESPONDING  PROVIDER #:        [id number]           QUERY TEXT:    Debridement is documented in the Medical Record. Please specify the type.      NOTE:  If an appropriate response is not listed below, please respond with a new note.        The patient's Clinical Indicators include:  OP Report:  In the office, I discussed with the patient the need for final closure, as he   has 1.5 cm separation of the mid glans from the proximal glans and he really  needs debridement of the base of the wound, some debridement of the lateral  edges and a multilayer closure.    At this point in time, I debrided the base of the wound sharply with curved tenotomy scissors  Options provided:   -- Non-excisional debridement   -- Excisional debridement, Please specify the following details- Deepest level of debridement treated, nature of tissue, size of wound, and length of closure/repair   -- Unable to determine      Query created by: Yaneth Hou on 10/13/2020 6:13 AM    RESPONSE TEXT:    excisional debridement- 1.5cm in depth and level of separaton. Wound was 3.5 cm in length which is the length of the closure          Electronically signed by:  JOSE TORRE MD 10/15/2020 6:07 PM

## 2021-01-04 RX ORDER — LOSARTAN POTASSIUM 100 MG/1
TABLET ORAL
Qty: 90 TAB | Refills: 0 | Status: SHIPPED | OUTPATIENT
Start: 2021-01-04 | End: 2021-03-29

## 2021-03-26 DIAGNOSIS — I10 ESSENTIAL HYPERTENSION: ICD-10-CM

## 2021-03-26 RX ORDER — AMLODIPINE BESYLATE 10 MG/1
TABLET ORAL
Qty: 90 TABLET | Refills: 3 | Status: SHIPPED | OUTPATIENT
Start: 2021-03-26 | End: 2022-03-28

## 2021-03-29 RX ORDER — LOSARTAN POTASSIUM 100 MG/1
TABLET ORAL
Qty: 90 TABLET | Refills: 1 | Status: SHIPPED | OUTPATIENT
Start: 2021-03-29 | End: 2021-04-15 | Stop reason: SDUPTHER

## 2021-04-08 NOTE — PROGRESS NOTES
"Chief Complaint   Patient presents with   • MI (Non ST Segment Elevation MI)   • Coronary Artery Disease     F/V Dx: Coronary artery disease involving native coronary artery with unstable angina pectoris (HCC)   • Premature Atrial Contractions (PACs)       Subjective:   Conner Fox is a 59 y.o. male who presents today for his coronary artery disease.     Patient was last seen by myself on 4/2/2020. A repeat echocardiogram was ordered to reassess his LVEF. He was overall dong well and no significant changes were made to his cardiovascular regimen.     Past medical history significant for hypertension, renal insufficiency, non-obstructive kidney stones, BPH and renal cyst.    Today patient states he has been overall feeling well. He has had two episodes of chest pain when he was stressed at work in the last 3 weeks, his chest pain completely resolved with the use on one dose of SL NTG. Patient has not had any recent follow up with his nephrologist or PCP because he has \"been feeling well\". Primary reason for his visit today is for annual med refill. He is not sure how his BP has been running at home as he has not checked it recently.     Denies SOB, palpitations, edema, dizziness or syncope.     Past Medical History:   Diagnosis Date   • Bowel habit changes     constipation   • CAD (coronary artery disease)    • Cataract     bilateral IOL   • Diabetes (HCC)     oral med   • Hemorrhagic disorder (HCC)     on Effient   • High cholesterol    • Hypertension    • Myocardial infarct (HCC) 01/10/2020    stents placed   • Renal disorder     decreased function after cardiac stent procedure   • Sleep apnea     does not use cpap   • Snoring     sleep study done     Past Surgical History:   Procedure Laterality Date   • CYSTOSCOPY N/A 10/2/2020    Procedure: CYSTOSCOPY- FLEXIBLE;  Surgeon: Homer Gomez M.D.;  Location: SURGERY [facility];  Service: Urology   • WOUND CLOSURE GENERAL N/A 10/2/2020    Procedure: CLOSURE, " WOUND, GENERAL- DEBRIDEMENT OF PENILE GLANDS AND CLOSURE OF COMPLEX PENILE WOUND;  Surgeon: Homer Gomez M.D.;  Location: SURGERY [facility];  Service: Urology   • PB CIRCUMCISION,OTHER,28+ D/O  8/26/2020    Procedure: CIRCUMCISION, ADULT - FOR DEBRIDEMENT OF PENILE GLANS;  Surgeon: Homer Gomez M.D.;  Location: SURGERY [facility];  Service: Urology   • SPHINCTER PROSTHESIS PLACEMENT  7/22/2020    Procedure: Aspiration and Irrigation of priapism, pluatyzq-[id number]-mbvoxrex glandular shunt;  Surgeon: Homer Gomez M.D.;  Location: SURGERY [facility];  Service: Urology   • OTHER CARDIAC SURGERY  01/10/2020    stent placement x2    • OTHER Bilateral     cataract extractions with IOL     Family History   Problem Relation Age of Onset   • Cancer Mother    • Glaucoma Mother    • Cancer Father    • Cancer Sister    • Cancer Paternal Grandfather      Social History     Socioeconomic History   • Marital status: Single     Spouse name: Not on file   • Number of children: Not on file   • Years of education: Not on file   • Highest education level: Not on file   Occupational History   • Not on file   Tobacco Use   • Smoking status: Never Smoker   • Smokeless tobacco: Never Used   Substance and Sexual Activity   • Alcohol use: Not Currently   • Drug use: Yes     Types: Inhaled     Comment: marijuana  monthly   • Sexual activity: Yes     Partners: Female   Other Topics Concern   • Not on file   Social History Narrative   • Not on file     Social Determinants of Health     Financial Resource Strain:    • Difficulty of Paying Living Expenses:    Food Insecurity:    • Worried About Running Out of Food in the Last Year:    • Ran Out of Food in the Last Year:    Transportation Needs:    • Lack of Transportation (Medical):    • Lack of Transportation (Non-Medical):    Physical Activity:    • Days of Exercise per Week:    • Minutes of Exercise per Session:    Stress:    • Feeling of Stress :    Social Connections:    •  Frequency of Communication with Friends and Family:    • Frequency of Social Gatherings with Friends and Family:    • Attends Mu-ism Services:    • Active Member of Clubs or Organizations:    • Attends Club or Organization Meetings:    • Marital Status:    Intimate Partner Violence:    • Fear of Current or Ex-Partner:    • Emotionally Abused:    • Physically Abused:    • Sexually Abused:      Allergies   Allergen Reactions   • Levofloxacin Unspecified     GI Bleeding     • Other Drug      Oral talwin   • Morphine Nausea     Nausea and disorientation     Outpatient Encounter Medications as of 4/15/2021   Medication Sig Dispense Refill   • ondansetron (ZOFRAN ODT) 4 MG TABLET DISPERSIBLE Take 4 mg by mouth every 6 hours as needed.     • hydrALAZINE (APRESOLINE) 10 MG Tab Take 1 tablet by mouth 2 times a day. 60 tablet 1   • prasugrel (EFFIENT) 10 MG Tab Take 1 tablet by mouth every day. 90 tablet 3   • losartan (COZAAR) 100 MG Tab Take 1 tablet by mouth every day. 90 tablet 3   • atorvastatin (LIPITOR) 80 MG tablet Take 1 tablet by mouth at bedtime. 90 tablet 3   • carvedilol (COREG) 25 MG Tab Take 1 tablet by mouth 2 times a day with meals. 180 tablet 3   • amLODIPine (NORVASC) 10 MG Tab TAKE 1 TABLET BY MOUTH EVERY DAY 90 tablet 3   • aspirin (ASPIRIN LOW DOSE) 81 MG EC tablet Take 1 tablet by mouth every day. Please call [phone] and schedule a follow up appointment for further refills. Thank you! 90 tablet 0   • HYDROcodone/acetaminophen (NORCO)  MG Tab Take 1-2 Tablets by mouth every 8 hours as needed.     • meloxicam (MOBIC) 15 MG tablet Take 15 mg by mouth as needed.     • Multiple Vitamins-Minerals (CENTRUM SILVER PO) Take 1 Tab by mouth every day.     • metFORMIN (GLUCOPHAGE) 500 MG Tab Take 1 Tab by mouth 2 times a day, with meals. 60 Tab 2   • nitroglycerin (NITROSTAT) 0.4 MG SL Tab Place 1 Tab under tongue as needed for Chest Pain (Take 5 minutes apart, if after 2nd dose you still have CP take  "a 3rd and call 911.). 25 Tab 0   • [DISCONTINUED] HYDROcodone-acetaminophen (NORCO) 5-325 MG Tab per tablet hydrocodone 5 mg-acetaminophen 325 mg tablet     • [DISCONTINUED] losartan (COZAAR) 100 MG Tab TAKE 1 TABLET BY MOUTH EVERY DAY 90 tablet 1   • [DISCONTINUED] prasugrel (EFFIENT) 10 MG Tab Take 1 tablet by mouth every day. Please call [phone] and schedule a follow up appointment for further refills. Thank you! 90 tablet 0   • [DISCONTINUED] carvedilol (COREG) 25 MG Tab Take 1 Tab by mouth 2 times a day, with meals. 60 Tab 3   • [DISCONTINUED] atorvastatin (LIPITOR) 40 MG Tab Take 1 Tab by mouth every bedtime. 90 Tab 0     No facility-administered encounter medications on file as of 4/15/2021.     Review of Systems   Constitutional: Negative for malaise/fatigue and weight loss.   Respiratory: Negative for shortness of breath.    Cardiovascular: Positive for chest pain (relieved with sl ntg). Negative for palpitations, orthopnea, claudication, leg swelling and PND.   Neurological: Negative for dizziness and weakness.   All other systems reviewed and are negative.       Objective:   /80 (BP Location: Left arm, Patient Position: Sitting, BP Cuff Size: Adult)   Pulse (!) 58   Resp 16   Ht 1.778 m (5' 10\")   Wt 102 kg (225 lb 9.6 oz)   SpO2 98%   BMI 32.37 kg/m²     Physical Exam   Constitutional: He is oriented to person, place, and time. He appears well-developed and well-nourished. No distress.   HENT:   Head: Normocephalic.   Eyes: EOM are normal.   Neck: No JVD present.   Cardiovascular: Normal rate and regular rhythm.   Pulmonary/Chest: Effort normal and breath sounds normal.   Abdominal: Soft. There is no abdominal tenderness.   Musculoskeletal:         General: No edema.   Neurological: He is alert and oriented to person, place, and time.   Skin: Skin is warm and dry.   Psychiatric: He has a normal mood and affect. His behavior is normal.      Unable to complete PE due to appointment " conducted via telephone.     Coronary Angiogram 1/10/2020:  Conclusions  1. 100% thrombotically occluded LCx culprit.  2. 80% focal mid ramus stenosis.  3. 80% focal mid LAD stenosis.  4. No ascending arch or thoracic descending aortic dissection.  5. LVEF 50% prior to intervention.  6. Successful PCI culprit LCx (3.0 x 30 mm Abdullahi GARRY), good result withresidual distal stent thrombus but SHERRI-3 flow.     Recommendations  * Continue medical management and risk factor modification.  * Effient for at least one year and Aspirin indefinitely.  * Staged PCI LAD and ramus and reevaluation of distal LCx within 6 to 12 weeks.     Coronary Angiogram 1/11/2020:  Pre-operative Diagnosis:  Status post stenting of the mid left circumflex artery for non-STEMI with recurrent angina     Post-operative Diagnosis:   1. Patent previously placed stent in the mid left circumflex artery with 95% stenosis distal to the stent 2.  Status post stenting of the distal left circumflex artery with 2.5 x 18 mm Abdullahi drug eluting stent  3.  80% mid left anterior descending artery (LAD) stenosis with total occlusion of distal LAD.  4.  70% proximal stenosis and 80% mid stenosis of the first diagonal branch of LAD     Renal US 1/11/2020:  1.  Bilateral nephrolithiasis, no hydronephrosis or obstructive changes are appreciated.  2.  Left upper pole renal cyst.     TTE 1/10/2020:  CONCLUSIONS  Moderately reduced left ventricular systolic function.  Left ventricular ejection fraction is visually estimated to be 40%.  Moderate concentric left ventricular hypertrophy.  Akinesis lateral wall.  Hypokinesis anterior wall.  Mild mitral regurgitation.  Aortic sclerosis without stenosis.  Mild aortic insufficiency.  Right heart pressures are consistent with moderate pulmonary   hypertension.  Ascending aorta is dilated with a diameter of 4.4 cm.  No prior study is available for comparison.     TTE 5/4/2020:  Moderate concentric left ventricular  hypertrophy.  Normal left ventricular systolic function.  Left ventricular ejection fraction is visually estimated to be 65%.  Posterolateral wall hypokinesis.  Normal left ventricular chamber size.  Mild aortic insufficiency.  Structurally normal mitral valve without significant stenosis or   regurgitation.  Structurally normal tricuspid valve without significant stenosis or   regurgitation.  Normal inferior vena cava size and inspiratory collapse.  Normal right ventricular size and systolic function.  Ascending aorta is dilated with a diameter of  4.1 cm.  Compared to the images of the study done 1/10/20 - there has been a   signficant improvement in LV systolic function..     CT Chest, Abdomen and Pelvis 1/17/2020:   IMPRESSION:  1. A 4.5 cm ascending aortic aneurysm. No evidence of aneurysm rupture on this noncontrast study. No intramural hematoma. Evaluation for aortic dissection is limited without IV contrast.  2. Nonobstructing left nephrolithiasis.  3. Indeterminate 1.5 cm right lower pole renal lesion. It may represent a solid mass. Outpatient evaluation with contrast-enhanced renal protocol MRI is advised.  4. Fat stranding in the region of the right femoral vessels, likely related to prior vascular access. If there is clinical concern for DVT, consider ultrasound DVT study.    NM-Cardiac PET Scan 3/4/20:  ELECTROCARDIOGRAPHIC FINDINGS:  Resting EKG showed sinus bradycardia with left axis deviation, nonspecific ST changes in the inferior leads and significant T-wave inversions in the lateral leads suggestive of ischemia.  There were occasional PVCs during stress agent administration with an equivocal stress EKG due to resting EKG changes.     SCINTOGRAPHIC FINDINGS:  The QC data for the scan was reviewed and was within   acceptable limits.  There was a large severe severity defect in the septal lateral wall in the entire mid to basal inferolateral and anterolateral walls, which was mostly  nonreversible.  The total summed stress score was 16, summed rest score was 13 indicating a summed difference score of 3 indicating mild ischemia and a large resting scar versus hibernating myocardium.     GATED WALL MOTION FINDINGS:  Resting ejection fraction 38%, stress ejection   fraction 42%.  There is associated wall motion abnormalities in the inferolateral and anterolateral walls in the same distribution as the myocardial scar.     CONCLUSIONS AND IMPRESSIONS:  Large scar in the inferolateral and anterolateral wall with minimal reversible ischemia with a summed difference score of 3.    Assessment:     1. Coronary artery disease involving native coronary artery of native heart without angina pectoris  EKG    EKG    prasugrel (EFFIENT) 10 MG Tab   2. Stage 3 chronic kidney disease, unspecified whether stage 3a or 3b CKD  Comp Metabolic Panel   3. Mixed hyperlipidemia  LIPID PANEL   4. Antiplatelet or antithrombotic long-term use  CBC WITHOUT DIFFERENTIAL   5. Elevated hemoglobin A1c  HEMOGLOBIN A1C (Glycohemoglobin GHB Total/A1C with MBG Estimate)   6. NSTEMI (non-ST elevated myocardial infarction) (HCC)  prasugrel (EFFIENT) 10 MG Tab   7. Essential hypertension         Medical Decision Making:  Today's Assessment / Status / Plan:     CAD S/P NSTEMI:  -LHC on 1/11/2020 showed patent previously placed LCx stent (placed on 1/10/2020), underwent subsequent PCI on 1/11/20 to LCx distal to previous stent.  -Previous plans to intervene on the LAD or ramus were put on hold as cardiac PET scan showed area to be mostly scar and that stenting would make little difference and increase his risk of progressive renal failure. Echocardiogram in May showed improved LVEF.   -Patient is now having some stable angina. Offered to start IMDUR however he did not tolerate it well due to low BP/lightheadedness in the past and would like to not try again.   -No recent lab work has been completed, will re-check his renal function and if improved will  discuss with Dr. Beck if PCI of the LAD should be pursued.   -LDL on 1/11/2020 was 66, at goal of <70.  -DAPT (ASA 81 mg daily and Effient 10 mg daily) will continue as patient has significant residual disease and is tolerated the medications well.   -Continue Amlodipine 10 mg daily, Atorvastatin 80 mg daily, losartan 100 mg daily and Coreg 25 mg BID.    Ischemic CM (40%):  -Recovered LVEF (65%).   -Continue BB/ARB as above.    Hypertension:  -Above goal, initially 154/88, 144/82 on recheck.   -Optimized on the amlodipine, BB/ARB.  -Current renal function is unknown. Will start low dose hydralazine 10 mg BID for now, advised patient to take a 3rd dose prn SBP >160.   -Will have patient continues to monitor at home and call if BP remains > 140/90.     Ascending Aortic Dilation:   -4.5 cm per CT on 1/17/2020.  -4.1 per TTE on 5/4/2020.     CKD:  -Followed by nephrologist Dr. Najjar, has not had an appointment in over one year.     Per Dr. Fadi Najjar nephrology note on 2/11/2020:  I appreciate cardiology team decision to postpone any angiogram at the moment considering the patient has high risk of contrast-induced nephropathy  However if angiogram/angioplasty deemed to be absolutely needed I would recommend to hydrate the patient well luda procedure.  Use the smallest amount of IV contrast possible  Follow-up kidney tests    Elevated A1C (6.7):  -On metformin.  -Ordered repeat A1C.  -Encouraged patient to follow up with his PCP for an annual apt.     Future Appointments   Date Time Provider Department Center   5/4/2020  1:15 PM [facility] EXAM 9 ECHO [facility]   7/6/2020 12:40 PM Lindsay Weathers M.D. [facility] None     Collaborating Provider: Dr. Brandon Cano    Please note that this dictation was created using voice recognition software. I have made every reasonable attempt to correct obvious errors, but I expect that there are errors of grammar and possibly content I did not discover before finalizing the  note.

## 2021-04-15 ENCOUNTER — OFFICE VISIT (OUTPATIENT)
Dept: CARDIOLOGY | Facility: MEDICAL CENTER | Age: 61
End: 2021-04-15
Payer: COMMERCIAL

## 2021-04-15 VITALS
DIASTOLIC BLOOD PRESSURE: 80 MMHG | RESPIRATION RATE: 16 BRPM | WEIGHT: 225.6 LBS | HEART RATE: 58 BPM | SYSTOLIC BLOOD PRESSURE: 144 MMHG | OXYGEN SATURATION: 98 % | BODY MASS INDEX: 32.3 KG/M2 | HEIGHT: 70 IN

## 2021-04-15 DIAGNOSIS — I25.10 CORONARY ARTERY DISEASE INVOLVING NATIVE CORONARY ARTERY OF NATIVE HEART WITHOUT ANGINA PECTORIS: ICD-10-CM

## 2021-04-15 DIAGNOSIS — E78.2 MIXED HYPERLIPIDEMIA: ICD-10-CM

## 2021-04-15 DIAGNOSIS — N18.30 STAGE 3 CHRONIC KIDNEY DISEASE, UNSPECIFIED WHETHER STAGE 3A OR 3B CKD: ICD-10-CM

## 2021-04-15 DIAGNOSIS — I21.4 NSTEMI (NON-ST ELEVATED MYOCARDIAL INFARCTION) (HCC): ICD-10-CM

## 2021-04-15 DIAGNOSIS — Z79.02 ANTIPLATELET OR ANTITHROMBOTIC LONG-TERM USE: ICD-10-CM

## 2021-04-15 DIAGNOSIS — R73.09 ELEVATED HEMOGLOBIN A1C: ICD-10-CM

## 2021-04-15 DIAGNOSIS — I10 ESSENTIAL HYPERTENSION: ICD-10-CM

## 2021-04-15 LAB — EKG IMPRESSION: NORMAL

## 2021-04-15 PROCEDURE — 93000 ELECTROCARDIOGRAM COMPLETE: CPT | Performed by: INTERNAL MEDICINE

## 2021-04-15 PROCEDURE — 99214 OFFICE O/P EST MOD 30 MIN: CPT | Performed by: NURSE PRACTITIONER

## 2021-04-15 RX ORDER — CARVEDILOL 25 MG/1
25 TABLET ORAL 2 TIMES DAILY WITH MEALS
Qty: 180 TABLET | Refills: 3 | Status: SHIPPED | OUTPATIENT
Start: 2021-04-15 | End: 2022-04-27

## 2021-04-15 RX ORDER — ATORVASTATIN CALCIUM 80 MG/1
80 TABLET, FILM COATED ORAL
Qty: 90 TABLET | Refills: 3 | Status: SHIPPED | OUTPATIENT
Start: 2021-04-15 | End: 2021-07-16

## 2021-04-15 RX ORDER — HYDRALAZINE HYDROCHLORIDE 10 MG/1
10 TABLET, FILM COATED ORAL 2 TIMES DAILY
Qty: 60 TABLET | Refills: 1 | Status: SHIPPED | OUTPATIENT
Start: 2021-04-15 | End: 2021-07-16

## 2021-04-15 RX ORDER — ONDANSETRON 4 MG/1
4 TABLET, ORALLY DISINTEGRATING ORAL EVERY 6 HOURS PRN
COMMUNITY
End: 2021-06-25

## 2021-04-15 RX ORDER — HYDROCODONE BITARTRATE AND ACETAMINOPHEN 5; 325 MG/1; MG/1
TABLET ORAL
COMMUNITY
End: 2021-04-15

## 2021-04-15 RX ORDER — PRASUGREL 10 MG/1
10 TABLET, FILM COATED ORAL DAILY
Qty: 90 TABLET | Refills: 3 | Status: SHIPPED | OUTPATIENT
Start: 2021-04-15 | End: 2022-03-14

## 2021-04-15 RX ORDER — LOSARTAN POTASSIUM 100 MG/1
100 TABLET ORAL
Qty: 90 TABLET | Refills: 3 | Status: SHIPPED | OUTPATIENT
Start: 2021-04-15 | End: 2022-05-23

## 2021-04-15 ASSESSMENT — ENCOUNTER SYMPTOMS
CLAUDICATION: 0
PND: 0
PALPITATIONS: 0
WEIGHT LOSS: 0
DIZZINESS: 0
ORTHOPNEA: 0
SHORTNESS OF BREATH: 0
WEAKNESS: 0

## 2021-04-15 ASSESSMENT — FIBROSIS 4 INDEX: FIB4 SCORE: 0.87

## 2021-06-03 DIAGNOSIS — I21.4 NSTEMI (NON-ST ELEVATED MYOCARDIAL INFARCTION) (HCC): ICD-10-CM

## 2021-06-03 DIAGNOSIS — I25.10 CORONARY ARTERY DISEASE INVOLVING NATIVE CORONARY ARTERY OF NATIVE HEART WITHOUT ANGINA PECTORIS: ICD-10-CM

## 2021-06-07 RX ORDER — ASPIRIN 81 MG/1
81 TABLET ORAL
Qty: 100 TABLET | Refills: 3 | Status: SHIPPED | OUTPATIENT
Start: 2021-06-07 | End: 2022-05-23

## 2021-06-09 ENCOUNTER — HOSPITAL ENCOUNTER (OUTPATIENT)
Dept: LAB | Facility: MEDICAL CENTER | Age: 61
End: 2021-06-09
Attending: NURSE PRACTITIONER
Payer: COMMERCIAL

## 2021-06-09 DIAGNOSIS — Z79.02 ANTIPLATELET OR ANTITHROMBOTIC LONG-TERM USE: ICD-10-CM

## 2021-06-09 DIAGNOSIS — R73.09 ELEVATED HEMOGLOBIN A1C: ICD-10-CM

## 2021-06-09 DIAGNOSIS — N18.30 STAGE 3 CHRONIC KIDNEY DISEASE, UNSPECIFIED WHETHER STAGE 3A OR 3B CKD: ICD-10-CM

## 2021-06-09 LAB
ALBUMIN SERPL BCP-MCNC: 4.6 G/DL (ref 3.2–4.9)
ALBUMIN/GLOB SERPL: 1.5 G/DL
ALP SERPL-CCNC: 60 U/L (ref 30–99)
ALT SERPL-CCNC: 26 U/L (ref 2–50)
ANION GAP SERPL CALC-SCNC: 11 MMOL/L (ref 7–16)
AST SERPL-CCNC: 33 U/L (ref 12–45)
BILIRUB SERPL-MCNC: 0.8 MG/DL (ref 0.1–1.5)
BUN SERPL-MCNC: 29 MG/DL (ref 8–22)
CALCIUM SERPL-MCNC: 9.2 MG/DL (ref 8.5–10.5)
CHLORIDE SERPL-SCNC: 104 MMOL/L (ref 96–112)
CHOLEST SERPL-MCNC: 161 MG/DL (ref 100–199)
CO2 SERPL-SCNC: 24 MMOL/L (ref 20–33)
CREAT SERPL-MCNC: 1.67 MG/DL (ref 0.5–1.4)
ERYTHROCYTE [DISTWIDTH] IN BLOOD BY AUTOMATED COUNT: 43.5 FL (ref 35.9–50)
EST. AVERAGE GLUCOSE BLD GHB EST-MCNC: 114 MG/DL
FASTING STATUS PATIENT QL REPORTED: NORMAL
GLOBULIN SER CALC-MCNC: 3 G/DL (ref 1.9–3.5)
GLUCOSE SERPL-MCNC: 100 MG/DL (ref 65–99)
HBA1C MFR BLD: 5.6 % (ref 4–5.6)
HCT VFR BLD AUTO: 43.1 % (ref 42–52)
HDLC SERPL-MCNC: 49 MG/DL
HGB BLD-MCNC: 15 G/DL (ref 14–18)
LDLC SERPL CALC-MCNC: 65 MG/DL
MCH RBC QN AUTO: 33.1 PG (ref 27–33)
MCHC RBC AUTO-ENTMCNC: 34.8 G/DL (ref 33.7–35.3)
MCV RBC AUTO: 95.1 FL (ref 81.4–97.8)
PLATELET # BLD AUTO: 200 K/UL (ref 164–446)
PMV BLD AUTO: 11.8 FL (ref 9–12.9)
POTASSIUM SERPL-SCNC: 4.6 MMOL/L (ref 3.6–5.5)
PROT SERPL-MCNC: 7.6 G/DL (ref 6–8.2)
RBC # BLD AUTO: 4.53 M/UL (ref 4.7–6.1)
SODIUM SERPL-SCNC: 139 MMOL/L (ref 135–145)
TRIGL SERPL-MCNC: 236 MG/DL (ref 0–149)
WBC # BLD AUTO: 7.3 K/UL (ref 4.8–10.8)

## 2021-06-09 PROCEDURE — 80053 COMPREHEN METABOLIC PANEL: CPT

## 2021-06-09 PROCEDURE — 36415 COLL VENOUS BLD VENIPUNCTURE: CPT

## 2021-06-09 PROCEDURE — 83036 HEMOGLOBIN GLYCOSYLATED A1C: CPT

## 2021-06-09 PROCEDURE — 85027 COMPLETE CBC AUTOMATED: CPT

## 2021-06-09 PROCEDURE — 80061 LIPID PANEL: CPT

## 2021-06-25 ENCOUNTER — OFFICE VISIT (OUTPATIENT)
Dept: MEDICAL GROUP | Facility: MEDICAL CENTER | Age: 61
End: 2021-06-25
Payer: COMMERCIAL

## 2021-06-25 VITALS
SYSTOLIC BLOOD PRESSURE: 132 MMHG | TEMPERATURE: 97.4 F | OXYGEN SATURATION: 96 % | BODY MASS INDEX: 32.07 KG/M2 | HEIGHT: 70 IN | HEART RATE: 74 BPM | WEIGHT: 224 LBS | DIASTOLIC BLOOD PRESSURE: 84 MMHG | RESPIRATION RATE: 16 BRPM

## 2021-06-25 DIAGNOSIS — E11.22 TYPE 2 DIABETES MELLITUS WITH STAGE 3 CHRONIC KIDNEY DISEASE, WITHOUT LONG-TERM CURRENT USE OF INSULIN, UNSPECIFIED WHETHER STAGE 3A OR 3B CKD (HCC): ICD-10-CM

## 2021-06-25 DIAGNOSIS — Z20.828 CONTACT W AND EXPOSURE TO OTH VIRAL COMMUNICABLE DISEASES: ICD-10-CM

## 2021-06-25 DIAGNOSIS — Z12.5 ENCOUNTER FOR SCREENING FOR MALIGNANT NEOPLASM OF PROSTATE: ICD-10-CM

## 2021-06-25 DIAGNOSIS — N18.30 TYPE 2 DIABETES MELLITUS WITH STAGE 3 CHRONIC KIDNEY DISEASE, WITHOUT LONG-TERM CURRENT USE OF INSULIN, UNSPECIFIED WHETHER STAGE 3A OR 3B CKD (HCC): ICD-10-CM

## 2021-06-25 DIAGNOSIS — S46.211A BICEPS TENDON RUPTURE, RIGHT, INITIAL ENCOUNTER: ICD-10-CM

## 2021-06-25 PROBLEM — N17.9 AKI (ACUTE KIDNEY INJURY) (HCC): Status: RESOLVED | Noted: 2020-01-10 | Resolved: 2021-06-25

## 2021-06-25 PROBLEM — I10 ESSENTIAL HYPERTENSION, BENIGN: Status: RESOLVED | Noted: 2020-02-06 | Resolved: 2021-06-25

## 2021-06-25 PROBLEM — R73.09 ELEVATED HEMOGLOBIN A1C: Status: RESOLVED | Noted: 2020-02-21 | Resolved: 2021-06-25

## 2021-06-25 PROCEDURE — 99213 OFFICE O/P EST LOW 20 MIN: CPT | Mod: CS | Performed by: FAMILY MEDICINE

## 2021-06-25 ASSESSMENT — PATIENT HEALTH QUESTIONNAIRE - PHQ9: CLINICAL INTERPRETATION OF PHQ2 SCORE: 0

## 2021-06-25 ASSESSMENT — PAIN SCALES - GENERAL: PAINLEVEL: NO PAIN

## 2021-06-25 ASSESSMENT — FIBROSIS 4 INDEX: FIB4 SCORE: 2.04

## 2021-06-25 NOTE — PROGRESS NOTES
"  Subjective:     Conner Fox is a 61 y.o. male presenting for:    1.  Covid testing: He needs a Covid test to return to work.  He was exposed to a Covid positive coworker 8 days ago.  He is currently asymptomatic.    2.  Biceps rupture: He states that his shoulder gave out about a week ago and developed pain and swelling in the biceps area.  This occurred at work about 1 week ago.  He has noticed residual weakness some bruising.        Current Outpatient Medications:   •  aspirin (ASPIRIN LOW DOSE) 81 MG EC tablet, Take 1 tablet by mouth every day., Disp: 100 tablet, Rfl: 3  •  prasugrel (EFFIENT) 10 MG Tab, Take 1 tablet by mouth every day., Disp: 90 tablet, Rfl: 3  •  losartan (COZAAR) 100 MG Tab, Take 1 tablet by mouth every day., Disp: 90 tablet, Rfl: 3  •  atorvastatin (LIPITOR) 80 MG tablet, Take 1 tablet by mouth at bedtime., Disp: 90 tablet, Rfl: 3  •  carvedilol (COREG) 25 MG Tab, Take 1 tablet by mouth 2 times a day with meals., Disp: 180 tablet, Rfl: 3  •  amLODIPine (NORVASC) 10 MG Tab, TAKE 1 TABLET BY MOUTH EVERY DAY, Disp: 90 tablet, Rfl: 3  •  meloxicam (MOBIC) 15 MG tablet, Take 15 mg by mouth as needed., Disp: , Rfl:   •  Multiple Vitamins-Minerals (CENTRUM SILVER PO), Take 1 Tab by mouth every day., Disp: , Rfl:   •  metFORMIN (GLUCOPHAGE) 500 MG Tab, Take 1 Tab by mouth 2 times a day, with meals., Disp: 60 Tab, Rfl: 2  •  nitroglycerin (NITROSTAT) 0.4 MG SL Tab, Place 1 Tab under tongue as needed for Chest Pain (Take 5 minutes apart, if after 2nd dose you still have CP take a 3rd and call 911.)., Disp: 25 Tab, Rfl: 0  •  hydrALAZINE (APRESOLINE) 10 MG Tab, Take 1 tablet by mouth 2 times a day. (Patient not taking: Reported on 6/25/2021), Disp: 60 tablet, Rfl: 1    Objective:     Vitals: /84 (BP Location: Right arm, Patient Position: Sitting, BP Cuff Size: Adult)   Pulse 74   Temp 36.3 °C (97.4 °F) (Temporal)   Resp 16   Ht 1.778 m (5' 10\")   Wt 102 kg (224 lb)   SpO2 96%   " BMI 32.14 kg/m²   General: Alert  HEENT: Normocephalic.   Extremities: No leg edema.  Ecchymosis present in the right upper arm.  Distal pulses 2+ symmetric.  No edema, skin lesions; toe nails clean.  Normal monofilament.  Achilles reflexes 2+ symmetric.      Assessment/Plan:     Conner was seen today for lab results.    Diagnoses and all orders for this visit:    Contact w and exposure to oth viral communicable diseases  Self-limited issue.  Covid test ordered.  Will notify patient on MyChart of results  -     SARS-CoV-2 PCR (24 hour In-House): Collect NP swab in VTM; Future    Biceps tendon rupture, right, initial encounter  Self-limited issue.  Recommended that he be evaluated for a possible biceps tendon rupture.  As he did this at work, recommended going through Worker's Compensation.    Type 2 diabetes mellitus with stage 3 chronic kidney disease, without long-term current use of insulin, unspecified whether stage 3a or 3b CKD (HCC)  Chronic, stable.  Foot exam was normal today.  Is due for a microalbumin  -     Diabetic Monofilament Lower Extremity Exam  -     Microalbumin Creat Ratio Urine - Lab Collect; Future    Encounter for screening for malignant neoplasm of prostate  -     PROSTATE SPECIFIC AG SCREENING; Future          Return in about 6 months (around 12/25/2021) for routine follow up.

## 2021-06-28 ENCOUNTER — HOSPITAL ENCOUNTER (OUTPATIENT)
Dept: LAB | Facility: MEDICAL CENTER | Age: 61
End: 2021-06-28
Attending: FAMILY MEDICINE
Payer: COMMERCIAL

## 2021-06-28 DIAGNOSIS — Z20.828 CONTACT W AND EXPOSURE TO OTH VIRAL COMMUNICABLE DISEASES: ICD-10-CM

## 2021-06-28 LAB
COVID ORDER STATUS COVID19: NORMAL
SARS-COV-2 RNA RESP QL NAA+PROBE: NOTDETECTED
SPECIMEN SOURCE: NORMAL

## 2021-06-28 PROCEDURE — U0003 INFECTIOUS AGENT DETECTION BY NUCLEIC ACID (DNA OR RNA); SEVERE ACUTE RESPIRATORY SYNDROME CORONAVIRUS 2 (SARS-COV-2) (CORONAVIRUS DISEASE [COVID-19]), AMPLIFIED PROBE TECHNIQUE, MAKING USE OF HIGH THROUGHPUT TECHNOLOGIES AS DESCRIBED BY CMS-2020-01-R: HCPCS

## 2021-06-28 PROCEDURE — U0005 INFEC AGEN DETEC AMPLI PROBE: HCPCS

## 2021-06-28 PROCEDURE — C9803 HOPD COVID-19 SPEC COLLECT: HCPCS

## 2021-07-16 ENCOUNTER — OFFICE VISIT (OUTPATIENT)
Dept: CARDIOLOGY | Facility: MEDICAL CENTER | Age: 61
End: 2021-07-16
Payer: COMMERCIAL

## 2021-07-16 VITALS
HEIGHT: 70 IN | WEIGHT: 228 LBS | OXYGEN SATURATION: 96 % | HEART RATE: 56 BPM | BODY MASS INDEX: 32.64 KG/M2 | SYSTOLIC BLOOD PRESSURE: 134 MMHG | DIASTOLIC BLOOD PRESSURE: 88 MMHG

## 2021-07-16 DIAGNOSIS — I10 ESSENTIAL HYPERTENSION: ICD-10-CM

## 2021-07-16 DIAGNOSIS — I21.4 NSTEMI (NON-ST ELEVATED MYOCARDIAL INFARCTION) (HCC): ICD-10-CM

## 2021-07-16 DIAGNOSIS — E11.9 TYPE 2 DIABETES MELLITUS WITHOUT COMPLICATION, WITHOUT LONG-TERM CURRENT USE OF INSULIN (HCC): ICD-10-CM

## 2021-07-16 DIAGNOSIS — N18.30 STAGE 3 CHRONIC KIDNEY DISEASE, UNSPECIFIED WHETHER STAGE 3A OR 3B CKD: ICD-10-CM

## 2021-07-16 DIAGNOSIS — E78.2 MIXED HYPERLIPIDEMIA: ICD-10-CM

## 2021-07-16 DIAGNOSIS — I25.10 CORONARY ARTERY DISEASE INVOLVING NATIVE CORONARY ARTERY OF NATIVE HEART WITHOUT ANGINA PECTORIS: ICD-10-CM

## 2021-07-16 PROCEDURE — 99214 OFFICE O/P EST MOD 30 MIN: CPT | Performed by: INTERNAL MEDICINE

## 2021-07-16 RX ORDER — ICOSAPENT ETHYL 1000 MG/1
2 CAPSULE ORAL 2 TIMES DAILY
Qty: 120 CAPSULE | Refills: 11 | Status: SHIPPED | OUTPATIENT
Start: 2021-07-16 | End: 2021-09-10

## 2021-07-16 RX ORDER — ATORVASTATIN CALCIUM 40 MG/1
40 TABLET, FILM COATED ORAL NIGHTLY
COMMUNITY
End: 2022-08-12 | Stop reason: SDUPTHER

## 2021-07-16 ASSESSMENT — FIBROSIS 4 INDEX: FIB4 SCORE: 2.04

## 2021-07-16 NOTE — PROGRESS NOTES
Chief Complaint   Patient presents with   • Coronary Artery Disease     follow up       Subjective:   Conner Fox is a 61 y.o. male who presents today for initial visit in follow-up of coronary artery disease characterized by ACS status post PCI with residual moderate CAD.  This occurred 1/2020.  EF has returned to normal.  Tolerating medical therapy well.  Has CCS class I intermittent angina with exertion.  Able to function well at his job working with [employer] and [employer] and spots.  Chronic kidney disease and daily nonsteroidal anti-inflammatory use.    Past Medical History:   Diagnosis Date   • Bowel habit changes     constipation   • CAD (coronary artery disease)    • Cataract     bilateral IOL   • Diabetes (HCC)     oral med   • Hemorrhagic disorder (HCC)     on Effient   • High cholesterol    • Hypertension    • Myocardial infarct (HCC) 01/10/2020    stents placed   • Renal disorder     decreased function after cardiac stent procedure   • Sleep apnea     does not use cpap   • Snoring     sleep study done     Past Surgical History:   Procedure Laterality Date   • CYSTOSCOPY N/A 10/2/2020    Procedure: CYSTOSCOPY- FLEXIBLE;  Surgeon: Homer Gomez M.D.;  Location: [facility];  Service: Urology   • WOUND CLOSURE GENERAL N/A 10/2/2020    Procedure: CLOSURE, WOUND, GENERAL- DEBRIDEMENT OF PENILE GLANDS AND CLOSURE OF COMPLEX PENILE WOUND;  Surgeon: Homer Gomez M.D.;  Location: [facility];  Service: Urology   • PB CIRCUMCISION,OTHER,28+ D/O  8/26/2020    Procedure: CIRCUMCISION, ADULT - FOR DEBRIDEMENT OF PENILE GLANS;  Surgeon: Homer Gomez M.D.;  Location: [facility];  Service: Urology   • SPHINCTER PROSTHESIS PLACEMENT  7/22/2020    Procedure: Aspiration and Irrigation of priapism, jmassuvi-wfqkkddcmr-fozjtedd glandular shunt;  Surgeon: Homer Gomez M.D.;  Location: [facility];  Service: Urology   • OTHER CARDIAC SURGERY  01/10/2020    stent  placement x2    • OTHER Bilateral     cataract extractions with IOL     Family History   Problem Relation Age of Onset   • Cancer Mother    • Glaucoma Mother    • Cancer Father    • Cancer Sister    • Cancer Paternal Grandfather      Social History     Socioeconomic History   • Marital status: Single     Spouse name: Not on file   • Number of children: Not on file   • Years of education: Not on file   • Highest education level: Not on file   Occupational History   • Not on file   Tobacco Use   • Smoking status: Never Smoker   • Smokeless tobacco: Never Used   Vaping Use   • Vaping Use: Never used   Substance and Sexual Activity   • Alcohol use: Not Currently   • Drug use: Yes     Types: Inhaled     Comment: marijuana  monthly   • Sexual activity: Yes     Partners: Female   Other Topics Concern   • Not on file   Social History Narrative   • Not on file     Social Determinants of Health     Financial Resource Strain:    • Difficulty of Paying Living Expenses:    Food Insecurity:    • Worried About Running Out of Food in the Last Year:    • Ran Out of Food in the Last Year:    Transportation Needs:    • Lack of Transportation (Medical):    • Lack of Transportation (Non-Medical):    Physical Activity:    • Days of Exercise per Week:    • Minutes of Exercise per Session:    Stress:    • Feeling of Stress :    Social Connections:    • Frequency of Communication with Friends and Family:    • Frequency of Social Gatherings with Friends and Family:    • Attends [employer] Services:    • Active Member of Clubs or Organizations:    • Attends Club or Organization Meetings:    • Marital Status:    Intimate Partner Violence:    • Fear of Current or Ex-Partner:    • Emotionally Abused:    • Physically Abused:    • Sexually Abused:      Allergies   Allergen Reactions   • Levofloxacin Unspecified     GI Bleeding     • Other Drug      Oral talwin   • Morphine Nausea     Nausea and disorientation     Outpatient Encounter Medications as  "of 7/16/2021   Medication Sig Dispense Refill   • atorvastatin (LIPITOR) 40 MG Tab Take 40 mg by mouth every evening.     • Icosapent Ethyl (VASCEPA) 1 g Cap Take 2 g by mouth 2 times a day. 120 capsule 11   • aspirin (ASPIRIN LOW DOSE) 81 MG EC tablet Take 1 tablet by mouth every day. 100 tablet 3   • prasugrel (EFFIENT) 10 MG Tab Take 1 tablet by mouth every day. 90 tablet 3   • losartan (COZAAR) 100 MG Tab Take 1 tablet by mouth every day. 90 tablet 3   • carvedilol (COREG) 25 MG Tab Take 1 tablet by mouth 2 times a day with meals. 180 tablet 3   • amLODIPine (NORVASC) 10 MG Tab TAKE 1 TABLET BY MOUTH EVERY DAY 90 tablet 3   • meloxicam (MOBIC) 15 MG tablet Take 15 mg by mouth as needed.     • Multiple Vitamins-Minerals (CENTRUM SILVER PO) Take 1 Tab by mouth every day.     • metFORMIN (GLUCOPHAGE) 500 MG Tab Take 1 Tab by mouth 2 times a day, with meals. 60 Tab 2   • nitroglycerin (NITROSTAT) 0.4 MG SL Tab Place 1 Tab under tongue as needed for Chest Pain (Take 5 minutes apart, if after 2nd dose you still have CP take a 3rd and call 911.). 25 Tab 0   • hydrALAZINE (APRESOLINE) 10 MG Tab Take 1 tablet by mouth 2 times a day. (Patient not taking: Reported on 6/25/2021) 60 tablet 1   • atorvastatin (LIPITOR) 80 MG tablet Take 1 tablet by mouth at bedtime. (Patient not taking: Reported on 7/16/2021) 90 tablet 3     No facility-administered encounter medications on file as of 7/16/2021.     Review of Systems   All other systems reviewed and are negative.       Objective:   /88 (BP Location: Left arm, Patient Position: Sitting)   Pulse (!) 56   Ht 1.778 m (5' 10\")   Wt 103 kg (228 lb)   SpO2 96%   BMI 32.71 kg/m²     Physical Exam   Constitutional: He is oriented to person, place, and time. He appears well-developed. No distress.   HENT:   Head: Normocephalic and atraumatic.   Right Ear: External ear normal.   Left Ear: External ear normal.   Eyes: Pupils are equal, round, and reactive to light. " Conjunctivae are normal. Right eye exhibits no discharge. Left eye exhibits no discharge. No scleral icterus.   Neck: No JVD present. No tracheal deviation present. No thyromegaly present.   Cardiovascular: Normal rate and regular rhythm. PMI is not displaced. Exam reveals no gallop and no friction rub.   No murmur heard.  Pulses:       Carotid pulses are 2+ on the right side and 2+ on the left side.       Radial pulses are 2+ on the left side.        Popliteal pulses are 2+ on the right side and 2+ on the left side.        Dorsalis pedis pulses are 2+ on the right side and 2+ on the left side.        Posterior tibial pulses are 2+ on the right side and 2+ on the left side.   Pulmonary/Chest: Effort normal and breath sounds normal. No respiratory distress. He has no wheezes. He has no rales. He exhibits no tenderness.   Abdominal: Soft. Bowel sounds are normal. He exhibits no distension. There is no abdominal tenderness.   Musculoskeletal:         General: No tenderness or deformity. Normal range of motion.      Cervical back: Normal range of motion and neck supple.   Neurological: He is alert and oriented to person, place, and time. No cranial nerve deficit (cranial nerves II through XII grossly intact). Coordination normal.   Skin: Skin is warm and dry. No rash noted. He is not diaphoretic. No erythema. No pallor.   Psychiatric: His behavior is normal. Thought content normal.   Vitals reviewed.    LABS:  Lab Results   Component Value Date/Time    CHOLSTRLTOT 161 06/09/2021 08:24 AM    LDL 65 06/09/2021 08:24 AM    HDL 49 06/09/2021 08:24 AM    TRIGLYCERIDE 236 (H) 06/09/2021 08:24 AM       Lab Results   Component Value Date/Time    WBC 7.3 06/09/2021 08:24 AM    RBC 4.53 (L) 06/09/2021 08:24 AM    HEMOGLOBIN 15.0 06/09/2021 08:24 AM    HEMATOCRIT 43.1 06/09/2021 08:24 AM    MCV 95.1 06/09/2021 08:24 AM    NEUTSPOLYS 84.60 (H) 08/27/2020 06:15 AM    LYMPHOCYTES 9.60 (L) 08/27/2020 06:15 AM    MONOCYTES 5.00  08/27/2020 06:15 AM    EOSINOPHILS 0.20 08/27/2020 06:15 AM    BASOPHILS 0.20 08/27/2020 06:15 AM     Lab Results   Component Value Date/Time    SODIUM 139 06/09/2021 08:24 AM    POTASSIUM 4.6 06/09/2021 08:24 AM    CHLORIDE 104 06/09/2021 08:24 AM    CO2 24 06/09/2021 08:24 AM    GLUCOSE 100 (H) 06/09/2021 08:24 AM    BUN 29 (H) 06/09/2021 08:24 AM    CREATININE 1.67 (H) 06/09/2021 08:24 AM     Lab Results   Component Value Date    HBA1C 5.6 06/09/2021      Lab Results   Component Value Date/Time    ALKPHOSPHAT 60 06/09/2021 08:24 AM    ASTSGOT 33 06/09/2021 08:24 AM    ALTSGPT 26 06/09/2021 08:24 AM    TBILIRUBIN 0.8 06/09/2021 08:24 AM      No results found for: BNPBTYPENAT   No results found for: TSH  Lab Results   Component Value Date/Time    PROTHROMBTM 13.8 08/25/2020 12:06 PM    INR 1.03 08/25/2020 12:06 PM            Assessment:     1. NSTEMI (non-ST elevated myocardial infarction) ([facility])     2. Coronary artery disease involving native coronary artery of native heart without angina pectoris  Icosapent Ethyl (VASCEPA) 1 g Cap   3. Stage 3 chronic kidney disease, unspecified whether stage 3a or 3b CKD ([facility])     4. Mixed hyperlipidemia     5. Essential hypertension     6. Type 2 diabetes mellitus without complication, without long-term current use of insulin ([facility])  Icosapent Ethyl (VASCEPA) 1 g Cap       Medical Decision Making:  Today's Assessment / Status / Plan:     LDL is at goal but triglycerides remain elevated in the context of diabetes mellitus and CAD.  We discussed Vascepa and I recommend 2 g twice daily initiated if he can afford it.  Otherwise over-the-counter fish oil at a comparable dose.  Recommend continuing his other medical therapy including DAPT until we see each other next in 6 months at which point we will likely discontinue the Effient and continue lifelong aspirin.  Continue other medical therapy.  Exercise diet and weight loss were discussed.    Follow-up 6 months

## 2021-09-10 DIAGNOSIS — E11.9 TYPE 2 DIABETES MELLITUS WITHOUT COMPLICATION, WITHOUT LONG-TERM CURRENT USE OF INSULIN (HCC): ICD-10-CM

## 2021-09-10 DIAGNOSIS — I25.10 CORONARY ARTERY DISEASE INVOLVING NATIVE CORONARY ARTERY OF NATIVE HEART WITHOUT ANGINA PECTORIS: ICD-10-CM

## 2021-09-10 RX ORDER — ICOSAPENT ETHYL 1000 MG/1
CAPSULE ORAL
Qty: 360 CAPSULE | Refills: 3 | Status: SHIPPED | OUTPATIENT
Start: 2021-09-10

## 2022-05-23 DIAGNOSIS — I10 ESSENTIAL HYPERTENSION: ICD-10-CM

## 2022-05-23 DIAGNOSIS — I25.10 CORONARY ARTERY DISEASE INVOLVING NATIVE CORONARY ARTERY OF NATIVE HEART WITHOUT ANGINA PECTORIS: ICD-10-CM

## 2022-05-23 DIAGNOSIS — I21.4 NSTEMI (NON-ST ELEVATED MYOCARDIAL INFARCTION) (HCC): ICD-10-CM

## 2022-05-24 RX ORDER — LOSARTAN POTASSIUM 100 MG/1
100 TABLET ORAL
Qty: 90 TABLET | Refills: 0 | Status: SHIPPED | OUTPATIENT
Start: 2022-05-24 | End: 2022-07-22

## 2022-05-24 RX ORDER — ASPIRIN 81 MG/1
TABLET, COATED ORAL
Qty: 90 TABLET | Refills: 0 | Status: SHIPPED | OUTPATIENT
Start: 2022-05-24 | End: 2022-08-17 | Stop reason: SDUPTHER

## 2022-07-21 DIAGNOSIS — I10 ESSENTIAL HYPERTENSION: ICD-10-CM

## 2022-07-22 RX ORDER — LOSARTAN POTASSIUM 100 MG/1
100 TABLET ORAL
Qty: 90 TABLET | Refills: 0 | Status: SHIPPED | OUTPATIENT
Start: 2022-07-22 | End: 2022-11-07

## 2022-07-22 NOTE — TELEPHONE ENCOUNTER
Is the patient due for a refill? Yes    Was the patient seen the past year? No    Date of last office visit: 7/16/2022    Does the patient have an upcoming appointment?  Yes   If yes, When? 10/11/2022    Provider to refill: TW     Does the patients insurance require a 100 day supply?  NO

## 2022-08-12 ENCOUNTER — OFFICE VISIT (OUTPATIENT)
Dept: MEDICAL GROUP | Facility: MEDICAL CENTER | Age: 62
End: 2022-08-12
Payer: COMMERCIAL

## 2022-08-12 VITALS
SYSTOLIC BLOOD PRESSURE: 126 MMHG | DIASTOLIC BLOOD PRESSURE: 84 MMHG | HEART RATE: 59 BPM | TEMPERATURE: 97.8 F | OXYGEN SATURATION: 98 % | RESPIRATION RATE: 16 BRPM | WEIGHT: 225 LBS | BODY MASS INDEX: 32.28 KG/M2

## 2022-08-12 DIAGNOSIS — Z12.5 PROSTATE CANCER SCREENING: ICD-10-CM

## 2022-08-12 DIAGNOSIS — E11.8 DIABETES MELLITUS TYPE 2 WITH COMPLICATIONS (HCC): ICD-10-CM

## 2022-08-12 DIAGNOSIS — Z95.5 HISTORY OF CORONARY ARTERY STENT PLACEMENT: ICD-10-CM

## 2022-08-12 DIAGNOSIS — E11.69 MIXED HYPERLIPIDEMIA DUE TO TYPE 2 DIABETES MELLITUS (HCC): ICD-10-CM

## 2022-08-12 DIAGNOSIS — Z12.11 COLON CANCER SCREENING: ICD-10-CM

## 2022-08-12 DIAGNOSIS — I10 ESSENTIAL HYPERTENSION: ICD-10-CM

## 2022-08-12 DIAGNOSIS — E78.2 MIXED HYPERLIPIDEMIA DUE TO TYPE 2 DIABETES MELLITUS (HCC): ICD-10-CM

## 2022-08-12 PROCEDURE — 92250 FUNDUS PHOTOGRAPHY W/I&R: CPT | Mod: 26 | Performed by: FAMILY MEDICINE

## 2022-08-12 PROCEDURE — 99214 OFFICE O/P EST MOD 30 MIN: CPT | Mod: 25 | Performed by: FAMILY MEDICINE

## 2022-08-12 RX ORDER — ATORVASTATIN CALCIUM 40 MG/1
40 TABLET, FILM COATED ORAL NIGHTLY
Qty: 90 TABLET | Refills: 2 | Status: SHIPPED | OUTPATIENT
Start: 2022-08-12 | End: 2023-03-03

## 2022-08-12 RX ORDER — NITROGLYCERIN 0.4 MG/1
0.4 TABLET SUBLINGUAL PRN
Qty: 25 TABLET | Refills: 0 | Status: SHIPPED | OUTPATIENT
Start: 2022-08-12 | End: 2022-08-17 | Stop reason: SDUPTHER

## 2022-08-12 ASSESSMENT — FIBROSIS 4 INDEX: FIB4 SCORE: 2.07

## 2022-08-12 ASSESSMENT — PATIENT HEALTH QUESTIONNAIRE - PHQ9: CLINICAL INTERPRETATION OF PHQ2 SCORE: 0

## 2022-08-12 NOTE — PROGRESS NOTES
CC: Diabetes, CAD/history of coronary stent, hypertension, hyperlipidemia    HPI:   Conner presents today to discuss the following medical issues:    History of coronary artery stent placement  Patient has been mostly asymptomatic.  However gets right chest pain once in a while, nitroglycerin has been helping.  Needs a refill.  Is currently on carvedilol 25 mg twice a day, aspirin 81 mg daily, prasugrel 10 mg daily, atorvastatin 40 mg daily    Diabetes mellitus type 2 with complications ([facility])  Last A1c was 5.6 a year ago.  Denies polyuria and polydipsia.  Patient has been on metformin 500 mg twice a day.  Patient is due for monofilament foot exam    Essential hypertension  Blood pressure has been adequately controlled on carvedilol 25 mg daily, losartan 100 mg daily.    Mixed hyperlipidemia due to type 2 diabetes mellitus ([facility])  He has been tolerating the statin. Denies muscle pain LFTs has been normal.  Patient has been on Atorvastatin 40 mg daily    Due for colonoscopy      Patient Active Problem List    Diagnosis Date Noted    Type 2 diabetes mellitus with stage 2 chronic kidney disease, without long-term current use of insulin ([facility]) 08/26/2020    Priapism, unspecified 07/24/2020    Benign prostatic hyperplasia with urinary obstruction 02/18/2020    Renal cyst 02/18/2020    Kidney stone 02/18/2020    Renal insufficiency syndrome 02/18/2020    Ischemic cardiomyopathy 01/23/2020    Ascending aorta dilatation ([facility]) 01/23/2020    Coronary artery disease involving native coronary artery with unstable angina pectoris ([facility]) 01/13/2020    NSTEMI (non-ST elevated myocardial infarction) ([facility]) 01/10/2020    Hypertensive urgency 01/10/2020    Essential hypertension 06/26/2017    Premature atrial complex 06/26/2017       Current Outpatient Medications   Medication Sig Dispense Refill    losartan (COZAAR) 100 MG Tab TAKE 1 TABLET BY MOUTH EVERY DAY 90 Tablet 0    carvedilol (COREG) 25 MG Tab TAKE 1 TABLET BY MOUTH TWICE A DAY  WITH MEALS. *APPOINTMENT NEEDED* 180 Tablet 0    ASPIRIN LOW DOSE 81 MG EC tablet TAKE 1 TABLET BY MOUTH EVERY DAY **NOT COVERED BY INS** 90 Tablet 0    metFORMIN (GLUCOPHAGE) 500 MG Tab TAKE 1 TABLET BY MOUTH TWICE A DAY WITH MEALS 180 Tablet 2    amLODIPine (NORVASC) 10 MG Tab TAKE 1 TABLET BY MOUTH EVERY DAY 90 Tablet 0    prasugrel (EFFIENT) 10 MG Tab Take 1 Tablet by mouth every day. PLEASE CALL [phone] AND SCHEDULE A FOLLOW UP APPOINTMENT FOR FURTHER REFILLS. THANK YOU! 90 Tablet 0    VASCEPA 1 g Cap TAKE 2 CAPSULES BY MOUTH TWICE A  Capsule 3    atorvastatin (LIPITOR) 40 MG Tab Take 40 mg by mouth every evening.      meloxicam (MOBIC) 15 MG tablet Take 15 mg by mouth as needed.      Multiple Vitamins-Minerals (CENTRUM SILVER PO) Take 1 Tab by mouth every day.      nitroglycerin (NITROSTAT) 0.4 MG SL Tab Place 1 Tab under tongue as needed for Chest Pain (Take 5 minutes apart, if after 2nd dose you still have CP take a 3rd and call 911.). 25 Tab 0     No current facility-administered medications for this visit.         Allergies as of 08/12/2022 - Reviewed 08/12/2022   Allergen Reaction Noted    Levofloxacin Unspecified 01/10/2020    Other drug  08/26/2020    Morphine Nausea 09/30/2020        ROS: Denies any chest pain, Shortness of breath, Changes bowel or bladder, Lower extremity edema.    Physical Exam:  /84 (BP Location: Right arm, Patient Position: Sitting, BP Cuff Size: Adult)   Pulse (!) 59   Temp 36.6 °C (97.8 °F) (Temporal)   Resp 16   Wt 102 kg (225 lb)   SpO2 98%   BMI 32.28 kg/m²   Gen.: Well-developed, well-nourished, no apparent distress,pleasant and cooperative with the examination  Skin:  Warm and dry with good turgor. No rashes or suspicious lesions in visible areas  HEENT:Sinuses nontender with palpation, TMs clear, nares patent with pink mucosa and clear rhinorrhea,no septal deviation ,polyps or lesions. lips without lesions, oropharynx clear.  Neck: Trachea midline,no  masses or adenopathy. No JVD.  Cor: Regular rate and rhythm without murmur, gallop or rub.  Lungs: Respirations unlabored.Clear to auscultation with equal breath sounds bilaterally. No wheezes, rhonchi.  Extremities: No cyanosis, clubbing or edema.      Monofilament foot exam: Normal sensation bilaterally, no macerations or ulcers, normal peripheral pulses.    Assessment and Plan.   62 y.o. male     1. History of coronary artery stent placement  Stable.  Currently asymptomatic.  However gets right chest pain once in a while, nitroglycerin has been helping.  Needs a refill.  Continue on carvedilol, atorvastatin, and prasugrel    - nitroglycerin (NITROSTAT) 0.4 MG SL Tab; Place 1 Tablet under the tongue as needed for Chest Pain (Take 5 minutes apart, if after 2nd dose you still have CP take a 3rd and call 911.).  Dispense: 25 Tablet; Refill: 0  - atorvastatin (LIPITOR) 40 MG Tab; Take 1 Tablet by mouth every evening.  Dispense: 90 Tablet; Refill: 2    2. Diabetes mellitus type 2 with complications (HCC)  Last A1c was 5.6 a year ago.  Continue metformin 500 mg twice a day  Monofilament foot exam showed no sign of neuropathy    - CBC WITH DIFFERENTIAL; Future  - HEMOGLOBIN A1C; Future  - Comp Metabolic Panel; Future  - Lipid Profile; Future  - MICROALBUMIN CREAT RATIO URINE; Future  - Diabetic Monofilament LE Exam  - POCT Retinal Eye Exam    3. Essential hypertension  Controlled.  Continue on carvedilol 25 mg daily, losartan 100 mg daily.    - CBC WITH DIFFERENTIAL; Future  - Comp Metabolic Panel; Future  - MICROALBUMIN CREAT RATIO URINE; Future  - TSH; Future    4. Mixed hyperlipidemia due to type 2 diabetes mellitus (HCC)  He has been tolerating the statin. Denies muscle pain LFTs has been normal  Atorvastatin 40 mg daily    - Lipid Profile; Future  - TSH; Future    5. Prostate cancer screening    - PROSTATE SPECIFIC AG SCREENING; Future    6. Colon cancer screening    - Referral to GI for Colonoscopy

## 2022-08-15 DIAGNOSIS — E13.319 RETINOPATHY DUE TO SECONDARY DIABETES (HCC): ICD-10-CM

## 2022-08-17 ENCOUNTER — TELEPHONE (OUTPATIENT)
Dept: CARDIOLOGY | Facility: MEDICAL CENTER | Age: 62
End: 2022-08-17
Payer: COMMERCIAL

## 2022-08-17 ENCOUNTER — TELEPHONE (OUTPATIENT)
Dept: HEALTH INFORMATION MANAGEMENT | Facility: OTHER | Age: 62
End: 2022-08-17
Payer: COMMERCIAL

## 2022-08-17 DIAGNOSIS — I21.4 NSTEMI (NON-ST ELEVATED MYOCARDIAL INFARCTION) (HCC): ICD-10-CM

## 2022-08-17 DIAGNOSIS — Z95.5 HISTORY OF CORONARY ARTERY STENT PLACEMENT: ICD-10-CM

## 2022-08-17 DIAGNOSIS — I25.10 CORONARY ARTERY DISEASE INVOLVING NATIVE CORONARY ARTERY OF NATIVE HEART WITHOUT ANGINA PECTORIS: ICD-10-CM

## 2022-08-17 RX ORDER — NITROGLYCERIN 0.4 MG/1
0.4 TABLET SUBLINGUAL PRN
Qty: 25 TABLET | Refills: 0 | Status: SHIPPED | OUTPATIENT
Start: 2022-08-17 | End: 2023-01-13

## 2022-08-17 RX ORDER — ASPIRIN 81 MG/1
TABLET ORAL
Qty: 90 TABLET | Refills: 0 | Status: SHIPPED | OUTPATIENT
Start: 2022-08-17 | End: 2023-01-13

## 2022-08-17 NOTE — TELEPHONE ENCOUNTER
TW    Caller: Conner    Medication Name and Dosage:    aspirin (ASPIRIN LOW DOSE) 81 MG EC tablet [[id number]]    nitroglycerin (NITROSTAT) 0.4 MG SL Tab [[id number]]    Did patient contact pharmacy (If no, please call pharmacy first): yes    Medication amount left: 0    Preferred Pharmacy:  [facility]    Other questions (Topic): N/a    Callback Number (Will only call for issues): [phone]    Thank you,    Noemi LIMA

## 2022-08-17 NOTE — TELEPHONE ENCOUNTER
----- Message from Wild Don M.D. sent at 8/15/2022 10:43 AM PDT -----  Abnormal result,   Retinal exam showed mild retinopathy of the left eye.  Referral to ophthalmology placed.      will discuss it next visit. Please let the patient know.

## 2022-08-17 NOTE — TELEPHONE ENCOUNTER
Phone Number Called: [phone] (home)     Call outcome: Spoke to patient regarding message below.    Message: Called to inform patient of retinal eye exam results and message from provider.  Patient verbalized understanding.

## 2022-09-12 ENCOUNTER — TELEPHONE (OUTPATIENT)
Dept: MEDICAL GROUP | Facility: MEDICAL CENTER | Age: 62
End: 2022-09-12
Payer: COMMERCIAL

## 2022-09-12 DIAGNOSIS — L60.0 INGROWING NAIL: ICD-10-CM

## 2022-10-11 ENCOUNTER — OFFICE VISIT (OUTPATIENT)
Dept: CARDIOLOGY | Facility: MEDICAL CENTER | Age: 62
End: 2022-10-11
Payer: COMMERCIAL

## 2022-10-11 VITALS
HEIGHT: 70 IN | RESPIRATION RATE: 16 BRPM | OXYGEN SATURATION: 97 % | DIASTOLIC BLOOD PRESSURE: 96 MMHG | SYSTOLIC BLOOD PRESSURE: 158 MMHG | HEART RATE: 64 BPM | WEIGHT: 228 LBS | BODY MASS INDEX: 32.64 KG/M2

## 2022-10-11 DIAGNOSIS — I25.9 ISCHEMIC HEART DISEASE DUE TO CORONARY ARTERY OBSTRUCTION (HCC): ICD-10-CM

## 2022-10-11 DIAGNOSIS — I24.0 ISCHEMIC HEART DISEASE DUE TO CORONARY ARTERY OBSTRUCTION (HCC): ICD-10-CM

## 2022-10-11 DIAGNOSIS — I10 ESSENTIAL HYPERTENSION: ICD-10-CM

## 2022-10-11 DIAGNOSIS — E78.2 MIXED HYPERLIPIDEMIA: ICD-10-CM

## 2022-10-11 DIAGNOSIS — R73.09 ELEVATED HEMOGLOBIN A1C: ICD-10-CM

## 2022-10-11 DIAGNOSIS — I25.9 CHEST PAIN DUE TO MYOCARDIAL ISCHEMIA, UNSPECIFIED ISCHEMIC CHEST PAIN TYPE: ICD-10-CM

## 2022-10-11 DIAGNOSIS — I25.10 CORONARY ARTERY DISEASE INVOLVING NATIVE CORONARY ARTERY OF NATIVE HEART WITHOUT ANGINA PECTORIS: ICD-10-CM

## 2022-10-11 PROCEDURE — 99214 OFFICE O/P EST MOD 30 MIN: CPT | Performed by: INTERNAL MEDICINE

## 2022-10-11 ASSESSMENT — FIBROSIS 4 INDEX: FIB4 SCORE: 2.07

## 2022-10-11 NOTE — PROGRESS NOTES
Chief Complaint   Patient presents with    Coronary Artery Disease     Follow up         Subjective:   Conner Fox is a 62 y.o. male who presents today for initial visit in follow-up of coronary artery disease characterized by ACS status post PCI with residual moderate CAD.  This occurred 1/2020.  EF has returned to normal.  Tolerating medical therapy well.  Has CCS class I intermittent angina with exertion.  Able to function well at his job working with [employer] and [employer] and spots.  Chronic kidney disease and daily nonsteroidal anti-inflammatory use.    Since last visit he noticed 4 separate episodes of resting right-sided chest discomfort rated a 1 out of 2 out of 10 after work days that were harder than usual.  He used nitroglycerin on each occasion and it resolved his symptoms however his nitroglycerin may have been older.  He does not perform routine cardiovascular activity outside of work.    Past Medical History:   Diagnosis Date    Bowel habit changes     constipation    CAD (coronary artery disease)     Cataract     bilateral IOL    Diabetes (HCC)     oral med    Hemorrhagic disorder (HCC)     on Effient    High cholesterol     Hypertension     Myocardial infarct (HCC) 01/10/2020    stents placed    Renal disorder     decreased function after cardiac stent procedure    Sleep apnea     does not use cpap    Snoring     sleep study done     Past Surgical History:   Procedure Laterality Date    CYSTOSCOPY N/A 10/2/2020    Procedure: CYSTOSCOPY- FLEXIBLE;  Surgeon: Homer Gomez M.D.;  Location: SURGERY [facility];  Service: Urology    WOUND CLOSURE GENERAL N/A 10/2/2020    Procedure: CLOSURE, WOUND, GENERAL- DEBRIDEMENT OF PENILE GLANDS AND CLOSURE OF COMPLEX PENILE WOUND;  Surgeon: Homer Gomez M.D.;  Location: [facility];  Service: Urology    AK CIRCUMCISION,OTHER,28+ D/O  8/26/2020    Procedure: CIRCUMCISION, ADULT - FOR DEBRIDEMENT OF PENILE GLANS;  Surgeon: Homer Gomez  M.D.;  Location: SURGERY [facility];  Service: Urology    SPHINCTER PROSTHESIS PLACEMENT  7/22/2020    Procedure: Aspiration and Irrigation of priapism, [id number] glandular shunt;  Surgeon: Homer Gomez M.D.;  Location: SURGERY [facility];  Service: Urology    OTHER CARDIAC SURGERY  01/10/2020    stent placement x2     OTHER Bilateral     cataract extractions with IOL     Family History   Problem Relation Age of Onset    Cancer Mother     Glaucoma Mother     Cancer Father     Cancer Sister     Cancer Paternal Grandfather      Social History     Socioeconomic History    Marital status: Single     Spouse name: Not on file    Number of children: Not on file    Years of education: Not on file    Highest education level: Not on file   Occupational History    Not on file   Tobacco Use    Smoking status: Never    Smokeless tobacco: Never   Vaping Use    Vaping Use: Never used   Substance and Sexual Activity    Alcohol use: Not Currently    Drug use: Yes     Types: Inhaled, Marijuana     Comment: marijuana  monthly    Sexual activity: Yes     Partners: Female   Other Topics Concern    Not on file   Social History Narrative    Not on file     Social Determinants of Health     Financial Resource Strain: Not on file   Food Insecurity: Not on file   Transportation Needs: Not on file   Physical Activity: Not on file   Stress: Not on file   Social Connections: Not on file   Intimate Partner Violence: Not on file   Housing Stability: Not on file     Allergies   Allergen Reactions    Levofloxacin Unspecified     GI Bleeding      Other Drug      Oral talwin    Morphine Nausea     Nausea and disorientation     Outpatient Encounter Medications as of 10/11/2022   Medication Sig Dispense Refill    aspirin (ASPIRIN LOW DOSE) 81 MG EC tablet TAKE 1 TABLET BY MOUTH EVERY DAY **NOT COVERED BY INS** 90 Tablet 0    nitroglycerin (NITROSTAT) 0.4 MG SL Tab Place 1 Tablet under the tongue as needed for Chest Pain  "(Take 5 minutes apart, if after 2nd dose you still have CP take a 3rd and call 911.). 25 Tablet 0    atorvastatin (LIPITOR) 40 MG Tab Take 1 Tablet by mouth every evening. 90 Tablet 2    carvedilol (COREG) 25 MG Tab TAKE 1 TABLET BY MOUTH TWICE A DAY WITH MEALS. *APPOINTMENT NEEDED* 180 Tablet 0    losartan (COZAAR) 100 MG Tab TAKE 1 TABLET BY MOUTH EVERY DAY 90 Tablet 0    metFORMIN (GLUCOPHAGE) 500 MG Tab TAKE 1 TABLET BY MOUTH TWICE A DAY WITH MEALS 180 Tablet 2    amLODIPine (NORVASC) 10 MG Tab TAKE 1 TABLET BY MOUTH EVERY DAY 90 Tablet 0    prasugrel (EFFIENT) 10 MG Tab Take 1 Tablet by mouth every day. PLEASE CALL [phone] AND SCHEDULE A FOLLOW UP APPOINTMENT FOR FURTHER REFILLS. THANK YOU! 90 Tablet 0    meloxicam (MOBIC) 15 MG tablet Take 15 mg by mouth as needed.      Multiple Vitamins-Minerals (CENTRUM SILVER PO) Take 1 Tab by mouth every day.      VASCEPA 1 g Cap TAKE 2 CAPSULES BY MOUTH TWICE A DAY (Patient not taking: Reported on 10/11/2022) 360 Capsule 3     No facility-administered encounter medications on file as of 10/11/2022.     Review of Systems   All other systems reviewed and are negative.     Objective:   BP (!) 158/96 (BP Location: Left arm, Patient Position: Sitting)   Pulse 64   Resp 16   Ht 1.778 m (5' 10\")   Wt 103 kg (228 lb)   SpO2 97%   BMI 32.71 kg/m²     Physical Exam  Vitals reviewed.   Constitutional:       General: He is not in acute distress.     Appearance: He is well-developed. He is not diaphoretic.   HENT:      Head: Normocephalic and atraumatic.      Right Ear: External ear normal.      Left Ear: External ear normal.   Eyes:      General: No scleral icterus.        Right eye: No discharge.         Left eye: No discharge.      Conjunctiva/sclera: Conjunctivae normal.      Pupils: Pupils are equal, round, and reactive to light.   Neck:      Thyroid: No thyromegaly.      Vascular: No JVD.      Trachea: No tracheal deviation.   Cardiovascular:      Rate and Rhythm: " Normal rate and regular rhythm.      Chest Wall: PMI is not displaced.      Pulses:           Carotid pulses are 2+ on the right side and 2+ on the left side.       Radial pulses are 2+ on the left side.        Popliteal pulses are 2+ on the right side and 2+ on the left side.        Dorsalis pedis pulses are 2+ on the right side and 2+ on the left side.        Posterior tibial pulses are 2+ on the right side and 2+ on the left side.      Heart sounds: No murmur heard.    No friction rub. No gallop.   Pulmonary:      Effort: Pulmonary effort is normal. No respiratory distress.      Breath sounds: Normal breath sounds. No wheezing or rales.   Chest:      Chest wall: No tenderness.   Abdominal:      General: Bowel sounds are normal. There is no distension.      Palpations: Abdomen is soft.      Tenderness: There is no abdominal tenderness.   Musculoskeletal:         General: No tenderness or deformity. Normal range of motion.      Cervical back: Normal range of motion and neck supple.   Skin:     General: Skin is warm and dry.      Coloration: Skin is not pale.      Findings: No erythema or rash.   Neurological:      Mental Status: He is alert and oriented to person, place, and time.      Cranial Nerves: No cranial nerve deficit (cranial nerves II through XII grossly intact).      Coordination: Coordination normal.   Psychiatric:         Behavior: Behavior normal.         Thought Content: Thought content normal.     LABS:  Lab Results   Component Value Date/Time    CHOLSTRLTOT 161 06/09/2021 08:24 AM    LDL 65 06/09/2021 08:24 AM    HDL 49 06/09/2021 08:24 AM    TRIGLYCERIDE 236 (H) 06/09/2021 08:24 AM       Lab Results   Component Value Date/Time    WBC 7.3 06/09/2021 08:24 AM    RBC 4.53 (L) 06/09/2021 08:24 AM    HEMOGLOBIN 15.0 06/09/2021 08:24 AM    HEMATOCRIT 43.1 06/09/2021 08:24 AM    MCV 95.1 06/09/2021 08:24 AM    NEUTSPOLYS 84.60 (H) 08/27/2020 06:15 AM    LYMPHOCYTES 9.60 (L) 08/27/2020 06:15 AM     MONOCYTES 5.00 08/27/2020 06:15 AM    EOSINOPHILS 0.20 08/27/2020 06:15 AM    BASOPHILS 0.20 08/27/2020 06:15 AM     Lab Results   Component Value Date/Time    SODIUM 139 06/09/2021 08:24 AM    POTASSIUM 4.6 06/09/2021 08:24 AM    CHLORIDE 104 06/09/2021 08:24 AM    CO2 24 06/09/2021 08:24 AM    GLUCOSE 100 (H) 06/09/2021 08:24 AM    BUN 29 (H) 06/09/2021 08:24 AM    CREATININE 1.67 (H) 06/09/2021 08:24 AM     Lab Results   Component Value Date    HBA1C 5.6 06/09/2021      Lab Results   Component Value Date/Time    ALKPHOSPHAT 60 06/09/2021 08:24 AM    ASTSGOT 33 06/09/2021 08:24 AM    ALTSGPT 26 06/09/2021 08:24 AM    TBILIRUBIN 0.8 06/09/2021 08:24 AM      No results found for: BNPBTYPENAT   No results found for: TSH  Lab Results   Component Value Date/Time    PROTHROMBTM 13.8 08/25/2020 12:06 PM    INR 1.03 08/25/2020 12:06 PM            Assessment:     1. Coronary artery disease involving native coronary artery of native heart without angina pectoris  EC-ECHOCARDIOGRAM COMPLETE W/O CONT      2. Essential hypertension        3. Mixed hyperlipidemia        4. Elevated hemoglobin A1c        5. Chest pain due to myocardial ischemia, unspecified ischemic chest pain type  EC-ECHOCARDIOGRAM COMPLETE W/O CONT    [id number] PET W/CT ATTENUATION      6. Ischemic heart disease due to coronary artery obstruction (HCC)  EC-ECHOCARDIOGRAM COMPLETE W/O CONT    [id number] PET W/CT ATTENUATION          Medical Decision Making:  Today's Assessment / Status / Plan:     LDL is pending.  Chest discomfort is atypical but given his underlying risk factors which are of undetermined control as he has been lost to follow-up for period of time due to scheduling conflicts, and the history of underlying ischemic heart disease coupled with his restricted physical activity outside of work I recommend stress testing.  Recommend cardiac PET due to body habitus.  Echocardiogram to ensure his ischemic cardiomyopathy has remained  recovered.  Continue other medical therapy including goal LDL less than 70 labs are upcoming.  Continue baby aspirin.  Follow-up after testing/6 months

## 2022-10-12 ENCOUNTER — PATIENT MESSAGE (OUTPATIENT)
Dept: HEALTH INFORMATION MANAGEMENT | Facility: OTHER | Age: 62
End: 2022-10-12

## 2022-10-22 DIAGNOSIS — I21.4 NSTEMI (NON-ST ELEVATED MYOCARDIAL INFARCTION) (HCC): ICD-10-CM

## 2022-10-22 DIAGNOSIS — I25.10 CORONARY ARTERY DISEASE INVOLVING NATIVE CORONARY ARTERY OF NATIVE HEART WITHOUT ANGINA PECTORIS: ICD-10-CM

## 2022-10-24 NOTE — TELEPHONE ENCOUNTER
Is the patient due for a refill? Yes    Was the patient seen the past year? Yes    Date of last office visit: 10/11/2022    Does the patient have an upcoming appointment?  No  Provider to refill:TW    Does the patients insurance require a 100 day supply?  No

## 2022-10-25 RX ORDER — PRASUGREL 10 MG/1
TABLET, FILM COATED ORAL
Qty: 90 TABLET | Refills: 3 | Status: SHIPPED | OUTPATIENT
Start: 2022-10-25

## 2022-11-05 DIAGNOSIS — I10 ESSENTIAL HYPERTENSION: ICD-10-CM

## 2022-11-07 RX ORDER — LOSARTAN POTASSIUM 100 MG/1
100 TABLET ORAL
Qty: 90 TABLET | Refills: 3 | Status: SHIPPED | OUTPATIENT
Start: 2022-11-07 | End: 2023-11-09

## 2022-11-07 NOTE — TELEPHONE ENCOUNTER
Is the patient due for a refill? Yes    Was the patient seen the past year? Yes    Date of last office visit: 10/11/22    Does the patient have an upcoming appointment?  No   If yes, When?     Provider to refill:TW    Does the patients insurance require a 100 day supply?  No

## 2022-11-11 ENCOUNTER — OFFICE VISIT (OUTPATIENT)
Dept: MEDICAL GROUP | Facility: MEDICAL CENTER | Age: 62
End: 2022-11-11
Payer: COMMERCIAL

## 2022-11-11 VITALS
WEIGHT: 223.99 LBS | OXYGEN SATURATION: 96 % | RESPIRATION RATE: 16 BRPM | DIASTOLIC BLOOD PRESSURE: 60 MMHG | HEART RATE: 64 BPM | TEMPERATURE: 97.1 F | SYSTOLIC BLOOD PRESSURE: 120 MMHG | BODY MASS INDEX: 32.07 KG/M2 | HEIGHT: 70 IN

## 2022-11-11 DIAGNOSIS — E11.8 DIABETES MELLITUS TYPE 2 WITH COMPLICATIONS (HCC): ICD-10-CM

## 2022-11-11 DIAGNOSIS — E11.69 MIXED HYPERLIPIDEMIA DUE TO TYPE 2 DIABETES MELLITUS (HCC): ICD-10-CM

## 2022-11-11 DIAGNOSIS — E78.2 MIXED HYPERLIPIDEMIA DUE TO TYPE 2 DIABETES MELLITUS (HCC): ICD-10-CM

## 2022-11-11 DIAGNOSIS — I10 ESSENTIAL HYPERTENSION: ICD-10-CM

## 2022-11-11 LAB
HBA1C MFR BLD: 5.6 % (ref 0–5.6)
INT CON NEG: NEGATIVE
INT CON POS: POSITIVE

## 2022-11-11 PROCEDURE — 83036 HEMOGLOBIN GLYCOSYLATED A1C: CPT | Performed by: FAMILY MEDICINE

## 2022-11-11 PROCEDURE — 99214 OFFICE O/P EST MOD 30 MIN: CPT | Performed by: FAMILY MEDICINE

## 2022-11-11 ASSESSMENT — FIBROSIS 4 INDEX: FIB4 SCORE: 2.07

## 2022-11-11 NOTE — PROGRESS NOTES
CC: Diabetes, hypertension, hyperlipidemia    HPI:   Conner presents today to discuss the following:    Diabetes mellitus type 2 with complications ([facility])  Blood glucose has been adequately controlled.  A1c today is 5.6.  Denies polyuria, polydipsia, and hypoglycemic episodes.  Patient has been on metformin 500 mg twice a day.  No side effects    Mixed hyperlipidemia due to type 2 diabetes mellitus ([facility])  He has been tolerating the statin. Denies muscle pain LFTs has been normal. Patient has been atorvastatin 40 mg daily, and Vascepa 2 g twice a day.  No side effects. Last lipid panel showed LDL of 69.  Patient supposed to have blood work done before next visit    Essential hypertension  Patient has controlled blood pressure on losartan 100 mg daily, and carvedilol 25 mg twice a day.  No side effects.      Patient Active Problem List    Diagnosis Date Noted    Type 2 diabetes mellitus with stage 2 chronic kidney disease, without long-term current use of insulin ([facility]) 08/26/2020    Priapism, unspecified 07/24/2020    Benign prostatic hyperplasia with urinary obstruction 02/18/2020    Renal cyst 02/18/2020    Kidney stone 02/18/2020    Renal insufficiency syndrome 02/18/2020    Ischemic cardiomyopathy 01/23/2020    Ascending aorta dilatation ([facility]) 01/23/2020    Coronary artery disease involving native coronary artery with unstable angina pectoris ([facility]) 01/13/2020    NSTEMI (non-ST elevated myocardial infarction) ([facility]) 01/10/2020    Hypertensive urgency 01/10/2020    Essential hypertension 06/26/2017    Premature atrial complex 06/26/2017       Current Outpatient Medications   Medication Sig Dispense Refill    prasugrel (EFFIENT) 10 MG Tab TAKE 1 TABLET BY MOUTH EVERY DAY 90 Tablet 3    losartan (COZAAR) 100 MG Tab TAKE 1 TABLET BY MOUTH EVERY DAY 90 Tablet 3    carvedilol (COREG) 25 MG Tab Take 1 Tablet by mouth 2 times a day. 180 Tablet 3    aspirin (ASPIRIN LOW DOSE) 81 MG EC tablet TAKE 1 TABLET BY MOUTH EVERY DAY  "**NOT COVERED BY INS** 90 Tablet 0    nitroglycerin (NITROSTAT) 0.4 MG SL Tab Place 1 Tablet under the tongue as needed for Chest Pain (Take 5 minutes apart, if after 2nd dose you still have CP take a 3rd and call 911.). 25 Tablet 0    atorvastatin (LIPITOR) 40 MG Tab Take 1 Tablet by mouth every evening. 90 Tablet 2    metFORMIN (GLUCOPHAGE) 500 MG Tab TAKE 1 TABLET BY MOUTH TWICE A DAY WITH MEALS 180 Tablet 2    amLODIPine (NORVASC) 10 MG Tab TAKE 1 TABLET BY MOUTH EVERY DAY 90 Tablet 0    VASCEPA 1 g Cap TAKE 2 CAPSULES BY MOUTH TWICE A DAY (Patient not taking: Reported on 10/11/2022) 360 Capsule 3    meloxicam (MOBIC) 15 MG tablet Take 15 mg by mouth as needed.      Multiple Vitamins-Minerals (CENTRUM SILVER PO) Take 1 Tab by mouth every day.       No current facility-administered medications for this visit.         Allergies as of 11/11/2022 - Reviewed 11/11/2022   Allergen Reaction Noted    Levofloxacin Unspecified 01/10/2020    Other drug  08/26/2020    Morphine Nausea 09/30/2020        ROS: Denies any chest pain, Shortness of breath, Changes bowel or bladder, Lower extremity edema.    Physical Exam:  /60 (BP Location: Right arm, Patient Position: Sitting, BP Cuff Size: Adult)   Pulse 64   Temp 36.2 °C (97.1 °F) (Temporal)   Resp 16   Ht 1.778 m (5' 10\")   Wt 102 kg (223 lb 15.8 oz)   SpO2 96%   BMI 32.14 kg/m²   Gen.: Well-developed, well-nourished, no apparent distress,pleasant and cooperative with the examination  Skin:  Warm and dry with good turgor. No rashes or suspicious lesions in visible areas  HEENT:Sinuses nontender with palpation, TMs clear, nares patent with pink mucosa and clear rhinorrhea,no septal deviation ,polyps or lesions. lips without lesions, oropharynx clear.  Neck: Trachea midline,no masses or adenopathy. No JVD.  Cor: Regular rate and rhythm without murmur, gallop or rub.  Lungs: Respirations unlabored.Clear to auscultation with equal breath sounds bilaterally. No " wheezes, rhonchi.  Extremities: No cyanosis, clubbing or edema.      Assessment and Plan.   62 y.o. male     1. Diabetes mellitus type 2 with complications (HCC)  Controlled.  A1c today is 5.6  Continue metformin 500 mg twice a day    - POCT  A1C    2. Mixed hyperlipidemia due to type 2 diabetes mellitus (HCC)  He has been tolerating the statin. Denies muscle pain LFTs has been normal  Continue atorvastatin 40 mg daily, and Vascepa 2 g twice a day    3. Essential hypertension  Controlled.  Continue on losartan 100 mg daily, and carvedilol 25 mg twice a day.

## 2022-11-23 ENCOUNTER — APPOINTMENT (OUTPATIENT)
Dept: RADIOLOGY | Facility: MEDICAL CENTER | Age: 62
End: 2022-11-23
Attending: INTERNAL MEDICINE
Payer: COMMERCIAL

## 2023-01-23 DIAGNOSIS — I10 ESSENTIAL HYPERTENSION: ICD-10-CM

## 2023-01-23 RX ORDER — AMLODIPINE BESYLATE 10 MG/1
TABLET ORAL
Qty: 90 TABLET | Refills: 0 | Status: SHIPPED | OUTPATIENT
Start: 2023-01-23 | End: 2023-07-13

## 2023-03-03 DIAGNOSIS — Z95.5 HISTORY OF CORONARY ARTERY STENT PLACEMENT: ICD-10-CM

## 2023-03-03 RX ORDER — ATORVASTATIN CALCIUM 40 MG/1
TABLET, FILM COATED ORAL
Qty: 90 TABLET | Refills: 2 | Status: SHIPPED | OUTPATIENT
Start: 2023-03-03

## 2023-07-13 DIAGNOSIS — I10 ESSENTIAL HYPERTENSION: ICD-10-CM

## 2023-07-13 RX ORDER — AMLODIPINE BESYLATE 10 MG/1
TABLET ORAL
Qty: 90 TABLET | Refills: 2 | Status: SHIPPED | OUTPATIENT
Start: 2023-07-13 | End: 2024-03-08 | Stop reason: SDUPTHER

## 2023-09-25 NOTE — TELEPHONE ENCOUNTER
Pt called back. He reports recent BP readings as follows:    4/13  178/121 59   195/121 63    4/14 156/65  4/15 155/107 67  4/16 177/112    Pt states readings are before he takes his medications.    Pt currently taking the following:   LOSARTAN 25MG BID  CARVEDILOL 25MG BID  AMLODIPINE 2.5MG DAILY    He states he re-checks his BP/HR after taking medications with no significant change to readings. Pt reports he's been hydrating well and taking Vitamin C. His only specific symptom is anxiety which he feels may be contributing to his BP readings. He is doing his best not stress out about it.    Highly advised stress relief measures and ER precautions, especially if he develops symptoms.      To Dr. Montano ADD   Complex Repair Preamble Text (Leave Blank If You Do Not Want): Extensive wide undermining was performed.

## 2023-11-08 DIAGNOSIS — I10 ESSENTIAL HYPERTENSION: ICD-10-CM

## 2023-11-09 ENCOUNTER — PATIENT MESSAGE (OUTPATIENT)
Dept: CARDIOLOGY | Facility: MEDICAL CENTER | Age: 63
End: 2023-11-09
Payer: COMMERCIAL

## 2023-11-09 RX ORDER — LOSARTAN POTASSIUM 100 MG/1
100 TABLET ORAL
Qty: 90 TABLET | Refills: 0 | Status: SHIPPED | OUTPATIENT
Start: 2023-11-09

## 2023-11-09 NOTE — TELEPHONE ENCOUNTER
Is the patient due for a refill? Yes    Was the patient seen the past year? Yes    Date of last office visit: 10/11/22    Does the patient have an upcoming appointment?  No    Provider to refill:TW    Does the patients insurance require a 100 day supply?  No

## 2023-11-10 NOTE — TELEPHONE ENCOUNTER
Pt due for metabolic panel and lipid profile.     Phone Number Called: [phone]    Call outcome: Pt has VM that not been set up. [employer] message sent.     Courtesy refill provided.    To schedulers, please reach out to patient for annual FV. Thank you.

## 2024-01-15 DIAGNOSIS — I10 ESSENTIAL HYPERTENSION: ICD-10-CM

## 2024-01-15 RX ORDER — CARVEDILOL 25 MG/1
25 TABLET ORAL 2 TIMES DAILY
Qty: 180 TABLET | Refills: 0 | Status: SHIPPED | OUTPATIENT
Start: 2024-01-15

## 2024-01-15 NOTE — TELEPHONE ENCOUNTER
Is the patient due for a refill? NO    Was the patient seen the past year? No    Date of last office visit: 10/11/2022    Does the patient have an upcoming appointment?  Yes   If yes, When? 02/06/2024    Provider to refill:TW    Does the patients insurance require a 100 day supply?  No

## 2024-02-06 ENCOUNTER — APPOINTMENT (OUTPATIENT)
Dept: CARDIOLOGY | Facility: MEDICAL CENTER | Age: 64
End: 2024-02-06
Attending: INTERNAL MEDICINE
Payer: COMMERCIAL

## 2024-03-08 DIAGNOSIS — I10 ESSENTIAL HYPERTENSION: ICD-10-CM

## 2024-03-08 RX ORDER — AMLODIPINE BESYLATE 10 MG/1
10 TABLET ORAL
Qty: 90 TABLET | Refills: 2 | Status: SHIPPED | OUTPATIENT
Start: 2024-03-08

## 2024-08-27 ENCOUNTER — DOCUMENTATION (OUTPATIENT)
Dept: HEALTH INFORMATION MANAGEMENT | Facility: OTHER | Age: 64
End: 2024-08-27
Payer: COMMERCIAL

## (undated) DEVICE — CATHETER FOLEY ROBINSON 16FR 16IN STRL (12EA/CA)

## (undated) DEVICE — TRAY SRGPRP PVP IOD WT PRP - (20/CA)

## (undated) DEVICE — SODIUM CHL IRRIGATION 0.9% 1000ML (12EA/CA)

## (undated) DEVICE — DRESSING PETROLEUM GAUZE 5 X 9" (50EA/BX 4BX/CA)"

## (undated) DEVICE — ELECTRODE 850 FOAM ADHESIVE - HYDROGEL RADIOTRNSPRNT (50/PK)

## (undated) DEVICE — WATER IRRIGATION STERILE 1000ML (12EA/CA)

## (undated) DEVICE — SUCTION INSTRUMENT YANKAUER BULBOUS TIP W/O VENT (50EA/CA)

## (undated) DEVICE — SET LEADWIRE 5 LEAD BEDSIDE DISPOSABLE ECG (1SET OF 5/EA)

## (undated) DEVICE — DRAIN PENROSE STERILE 1/4 X - 18 IN  (25EA/BX)

## (undated) DEVICE — GOWN WARMING STANDARD FLEX - (30/CA)

## (undated) DEVICE — PROTECTOR ULNA NERVE - (36PR/CA)

## (undated) DEVICE — SET EXTENSION WITH 2 PORTS (48EA/CA) ***PART #2C8610 IS A SUBSTITUTE*****

## (undated) DEVICE — DRAPE LARGE 3 QUARTER - (20/CA)

## (undated) DEVICE — GLOVE BIOGEL SZ 7.5 SURGICAL PF LTX - (50PR/BX 4BX/CA)

## (undated) DEVICE — PAD BABY LAP 4X18 W/O - RINGS PREWASHED 5/PK 40PK/CS

## (undated) DEVICE — GOWN SURGICAL X-LARGE ULTRA - FILM-REINFORCED (20/CA)

## (undated) DEVICE — LACTATED RINGERS INJ 1000 ML - (14EA/CA 60CA/PF)

## (undated) DEVICE — TUBING CLEARLINK DUO-VENT - C-FLO (48EA/CA)

## (undated) DEVICE — SYRINGE DISP. 50CC LS - (40/BX)

## (undated) DEVICE — HEAD HOLDER JUNIOR/ADULT

## (undated) DEVICE — SUTURE GENERAL

## (undated) DEVICE — SENSOR SPO2 NEO LNCS ADHESIVE (20/BX) SEE USER NOTES

## (undated) DEVICE — SUTURE 3-0 CHROMIC GUT FS-2 27 (36PK/BX)"

## (undated) DEVICE — CANISTER SUCTION 3000ML MECHANICAL FILTER AUTO SHUTOFF MEDI-VAC NONSTERILE LF DISP  (40EA/CA)

## (undated) DEVICE — BOVIE NEEDLE TIP 3CM COLORADO

## (undated) DEVICE — JELLY SURGILUBE STERILE TUBE 4.25 OZ (1/EA)

## (undated) DEVICE — NEEDLE NON SAFETY 25 GA X 1 1/2 IN HYPO (100EA/BX)

## (undated) DEVICE — RINGDISP RETRACTOR LONESTAR

## (undated) DEVICE — SUTURE 4-0 ETHILON PS-2 18 BLACK (36PK/BX)

## (undated) DEVICE — PACK MINOR BASIN - (2EA/CA)

## (undated) DEVICE — SET IRRIGATION CYSTOSCOPY TUBE L80 IN (20EA/CA)

## (undated) DEVICE — KIT ANESTHESIA W/CIRCUIT & 3/LT BAG W/FILTER (20EA/CA)

## (undated) DEVICE — STAPLER SKIN DISP - (6/BX 10BX/CA) VISISTAT

## (undated) DEVICE — TUBE CONNECT SUCTION CLEAR 120 X 1/4" (50EA/CA)"

## (undated) DEVICE — SYRINGE 30 ML LL (56/BX)

## (undated) DEVICE — GLOVE BIOGEL PI ORTHO SZ 7 PF LF (40PR/BX)

## (undated) DEVICE — SLEEVE, VASO, THIGH, MED

## (undated) DEVICE — ELECTRODE DUAL RETURN W/ CORD - (50/PK)

## (undated) DEVICE — MASK ANESTHESIA ADULT  - (100/CA)

## (undated) DEVICE — SUTURE 3-0 PROLENE SH 30 (36PK/BX)"

## (undated) DEVICE — NEPTUNE 4 PORT MANIFOLD - (20/PK)

## (undated) DEVICE — SPONGE GAUZESTER 4 X 4 4PLY - (128PK/CA)

## (undated) DEVICE — DRAPE LAPAROTOMY T SHEET - (12EA/CA)

## (undated) DEVICE — GLOVE BIOGEL INDICATOR SZ 7.5 SURGICAL PF LTX - (50PR/BX 4BX/CA)

## (undated) DEVICE — DRAPE UNDER BUTTOCK LRG (40/CA)

## (undated) DEVICE — SYRINGE 10 ML CONTROL LL (25EA/BX 4BX/CA)

## (undated) DEVICE — BLADE SURGICAL #15 - (50/BX 3BX/CA)

## (undated) DEVICE — KIT RADIAL ARTERY 20GA W/MAX BARRIER AND BIOPATCH  (5EA/CA) #10740 IS FOR THE SET RADIAL ARTERIAL

## (undated) DEVICE — TRAY SKIN SCRUB PVP WET (20EA/CA) PART #DYND70356 DISCONTINUED

## (undated) DEVICE — DRAPE MAYO STAND - (30/CA)

## (undated) DEVICE — GOWN SURGEONS X-LARGE - DISP. (30/CA)

## (undated) DEVICE — WATER IRRIG. STER 3000 ML - (4/CA)

## (undated) DEVICE — SUTURE 3-0 VICRYL PLUS SH - 27 INCH (36/BX)

## (undated) DEVICE — SUTURE 4-0 VICRYL PLUS RB-1 - 27 INCH (36/BX)

## (undated) DEVICE — WRAP COBAN SELF-ADHERENT 6 IN X  5YDS STERILE TAN (12/CA)

## (undated) DEVICE — TUBE E-T HI-LO CUFF 8.0MM (10EA/PK)